# Patient Record
Sex: FEMALE | Race: BLACK OR AFRICAN AMERICAN | Employment: FULL TIME | URBAN - NONMETROPOLITAN AREA
[De-identification: names, ages, dates, MRNs, and addresses within clinical notes are randomized per-mention and may not be internally consistent; named-entity substitution may affect disease eponyms.]

---

## 2016-12-29 LAB
A/G RATIO: NORMAL
ALBUMIN SERPL-MCNC: 5.1 G/DL
ALP BLD-CCNC: 94 U/L
ALT SERPL-CCNC: 46 U/L
AST SERPL-CCNC: 34 U/L
BILIRUB SERPL-MCNC: 0.6 MG/DL (ref 0.1–1.4)
BILIRUBIN DIRECT: 0.1 MG/DL
BILIRUBIN, INDIRECT: 0.6
CHOLESTEROL, TOTAL: 247 MG/DL
CHOLESTEROL/HDL RATIO: 4.6
GLOBULIN: NORMAL
HDLC SERPL-MCNC: 53 MG/DL (ref 35–70)
LDL CHOLESTEROL CALCULATED: 174 MG/DL (ref 0–160)
PROTEIN TOTAL: 7.6 G/DL
TRIGL SERPL-MCNC: 170 MG/DL
VLDLC SERPL CALC-MCNC: 34 MG/DL

## 2017-01-03 ENCOUNTER — ANTI-COAG VISIT (OUTPATIENT)
Dept: OTHER | Age: 52
End: 2017-01-03

## 2017-01-03 VITALS
SYSTOLIC BLOOD PRESSURE: 142 MMHG | DIASTOLIC BLOOD PRESSURE: 88 MMHG | BODY MASS INDEX: 39.65 KG/M2 | WEIGHT: 231 LBS | HEART RATE: 82 BPM

## 2017-01-03 DIAGNOSIS — I26.99 OTHER PULMONARY EMBOLISM WITHOUT ACUTE COR PULMONALE, UNSPECIFIED CHRONICITY (HCC): ICD-10-CM

## 2017-01-03 LAB
HCT VFR BLD CALC: 38.4 % (ref 37–47)
HEMOGLOBIN: 12.8 GM/DL (ref 12–16)
POC INR: 2.9 (ref 0.8–1.2)

## 2017-01-03 PROCEDURE — 99999 PR OFFICE/OUTPT VISIT,PROCEDURE ONLY: CPT | Performed by: NURSE PRACTITIONER

## 2017-01-03 PROCEDURE — 85610 PROTHROMBIN TIME: CPT | Performed by: NURSE PRACTITIONER

## 2017-01-03 RX ORDER — ATORVASTATIN CALCIUM 20 MG/1
20 TABLET, FILM COATED ORAL DAILY
COMMUNITY

## 2017-01-03 ASSESSMENT — ENCOUNTER SYMPTOMS
CONSTIPATION: 0
SHORTNESS OF BREATH: 0
DIARRHEA: 0
BLOOD IN STOOL: 0

## 2017-01-04 ENCOUNTER — TELEPHONE (OUTPATIENT)
Dept: OTHER | Age: 52
End: 2017-01-04

## 2017-01-18 ENCOUNTER — TELEPHONE (OUTPATIENT)
Dept: OTHER | Age: 52
End: 2017-01-18

## 2017-01-31 ENCOUNTER — ANTI-COAG VISIT (OUTPATIENT)
Dept: OTHER | Age: 52
End: 2017-01-31

## 2017-01-31 VITALS
BODY MASS INDEX: 39.48 KG/M2 | WEIGHT: 230 LBS | SYSTOLIC BLOOD PRESSURE: 145 MMHG | DIASTOLIC BLOOD PRESSURE: 88 MMHG | HEART RATE: 76 BPM

## 2017-01-31 DIAGNOSIS — I26.99 OTHER PULMONARY EMBOLISM WITHOUT ACUTE COR PULMONALE, UNSPECIFIED CHRONICITY (HCC): ICD-10-CM

## 2017-01-31 LAB — POC INR: 2.6 (ref 0.8–1.2)

## 2017-01-31 PROCEDURE — 85610 PROTHROMBIN TIME: CPT | Performed by: NURSE PRACTITIONER

## 2017-01-31 PROCEDURE — 99999 PR OFFICE/OUTPT VISIT,PROCEDURE ONLY: CPT | Performed by: NURSE PRACTITIONER

## 2017-01-31 RX ORDER — LORATADINE 10 MG/1
10 TABLET ORAL DAILY
COMMUNITY
End: 2017-05-23

## 2017-01-31 RX ORDER — AMOXICILLIN AND CLAVULANATE POTASSIUM 875; 125 MG/1; MG/1
TABLET, FILM COATED ORAL
Refills: 0 | COMMUNITY
Start: 2017-01-25 | End: 2017-03-28

## 2017-01-31 ASSESSMENT — ENCOUNTER SYMPTOMS
DIARRHEA: 0
CONSTIPATION: 0
SHORTNESS OF BREATH: 0
BLOOD IN STOOL: 0

## 2017-03-07 ENCOUNTER — ANTI-COAG VISIT (OUTPATIENT)
Dept: OTHER | Age: 52
End: 2017-03-07

## 2017-03-07 VITALS
HEART RATE: 74 BPM | WEIGHT: 231 LBS | BODY MASS INDEX: 39.65 KG/M2 | SYSTOLIC BLOOD PRESSURE: 134 MMHG | DIASTOLIC BLOOD PRESSURE: 75 MMHG

## 2017-03-07 DIAGNOSIS — I26.99 OTHER PULMONARY EMBOLISM WITHOUT ACUTE COR PULMONALE, UNSPECIFIED CHRONICITY (HCC): ICD-10-CM

## 2017-03-07 LAB — POC INR: 3.2 (ref 0.8–1.2)

## 2017-03-07 PROCEDURE — 99212 OFFICE O/P EST SF 10 MIN: CPT | Performed by: NURSE PRACTITIONER

## 2017-03-07 PROCEDURE — 85610 PROTHROMBIN TIME: CPT | Performed by: NURSE PRACTITIONER

## 2017-03-07 ASSESSMENT — ENCOUNTER SYMPTOMS
DIARRHEA: 0
SHORTNESS OF BREATH: 0
BLOOD IN STOOL: 0
CONSTIPATION: 0

## 2017-03-22 DIAGNOSIS — I26.99 OTHER ACUTE PULMONARY EMBOLISM WITHOUT ACUTE COR PULMONALE (HCC): Primary | ICD-10-CM

## 2017-03-28 ENCOUNTER — ANTI-COAG VISIT (OUTPATIENT)
Dept: OTHER | Age: 52
End: 2017-03-28

## 2017-03-28 VITALS
SYSTOLIC BLOOD PRESSURE: 135 MMHG | BODY MASS INDEX: 39.31 KG/M2 | HEART RATE: 76 BPM | DIASTOLIC BLOOD PRESSURE: 75 MMHG | WEIGHT: 229 LBS

## 2017-03-28 DIAGNOSIS — I26.99 OTHER PULMONARY EMBOLISM WITHOUT ACUTE COR PULMONALE, UNSPECIFIED CHRONICITY (HCC): ICD-10-CM

## 2017-03-28 DIAGNOSIS — I26.99 OTHER ACUTE PULMONARY EMBOLISM WITHOUT ACUTE COR PULMONALE (HCC): ICD-10-CM

## 2017-03-28 LAB — POC INR: 2.5 (ref 0.8–1.2)

## 2017-03-28 RX ORDER — ALENDRONATE SODIUM 35 MG/1
70 TABLET ORAL
COMMUNITY
Start: 2017-02-11 | End: 2017-12-28

## 2017-03-28 ASSESSMENT — ENCOUNTER SYMPTOMS
BLOOD IN STOOL: 0
CONSTIPATION: 0
DIARRHEA: 0
SHORTNESS OF BREATH: 0

## 2017-04-25 ENCOUNTER — ANTI-COAG VISIT (OUTPATIENT)
Dept: OTHER | Age: 52
End: 2017-04-25

## 2017-04-25 VITALS
DIASTOLIC BLOOD PRESSURE: 77 MMHG | BODY MASS INDEX: 39.65 KG/M2 | SYSTOLIC BLOOD PRESSURE: 132 MMHG | HEART RATE: 75 BPM | WEIGHT: 231 LBS

## 2017-04-25 DIAGNOSIS — I26.99 OTHER PULMONARY EMBOLISM WITHOUT ACUTE COR PULMONALE, UNSPECIFIED CHRONICITY (HCC): ICD-10-CM

## 2017-04-25 DIAGNOSIS — I26.99 OTHER ACUTE PULMONARY EMBOLISM WITHOUT ACUTE COR PULMONALE (HCC): ICD-10-CM

## 2017-04-25 LAB — POC INR: 3 (ref 0.8–1.2)

## 2017-04-25 RX ORDER — BENZONATATE 200 MG/1
200 CAPSULE ORAL 3 TIMES DAILY PRN
COMMUNITY
End: 2017-04-25

## 2017-04-25 ASSESSMENT — ENCOUNTER SYMPTOMS
BLOOD IN STOOL: 0
CONSTIPATION: 0
SHORTNESS OF BREATH: 0

## 2017-05-04 ENCOUNTER — TELEPHONE (OUTPATIENT)
Dept: OTHER | Age: 52
End: 2017-05-04

## 2017-05-23 ENCOUNTER — ANTI-COAG VISIT (OUTPATIENT)
Dept: OTHER | Age: 52
End: 2017-05-23

## 2017-05-23 VITALS
DIASTOLIC BLOOD PRESSURE: 68 MMHG | SYSTOLIC BLOOD PRESSURE: 119 MMHG | BODY MASS INDEX: 38.28 KG/M2 | WEIGHT: 223 LBS | HEART RATE: 64 BPM

## 2017-05-23 DIAGNOSIS — I26.99 OTHER ACUTE PULMONARY EMBOLISM WITHOUT ACUTE COR PULMONALE (HCC): ICD-10-CM

## 2017-05-23 DIAGNOSIS — I26.99 OTHER PULMONARY EMBOLISM WITHOUT ACUTE COR PULMONALE, UNSPECIFIED CHRONICITY (HCC): ICD-10-CM

## 2017-05-23 PROBLEM — E11.9 TYPE 2 DIABETES MELLITUS (HCC): Status: ACTIVE | Noted: 2017-05-23

## 2017-05-23 LAB — POC INR: 1.3 (ref 0.8–1.2)

## 2017-05-23 RX ORDER — LISINOPRIL 5 MG/1
5 TABLET ORAL DAILY
COMMUNITY

## 2017-05-23 ASSESSMENT — ENCOUNTER SYMPTOMS
CONSTIPATION: 0
BLOOD IN STOOL: 0
RHINORRHEA: 1
DIARRHEA: 0
SHORTNESS OF BREATH: 0

## 2017-05-30 RX ORDER — WARFARIN SODIUM 5 MG/1
TABLET ORAL
Qty: 270 TABLET | Refills: 3 | Status: SHIPPED | OUTPATIENT
Start: 2017-05-30 | End: 2018-06-01 | Stop reason: SDUPTHER

## 2017-05-30 RX ORDER — WARFARIN SODIUM 5 MG/1
TABLET ORAL
Qty: 270 TABLET | Refills: 2 | OUTPATIENT
Start: 2017-05-30

## 2017-06-06 ENCOUNTER — ANTI-COAG VISIT (OUTPATIENT)
Dept: OTHER | Age: 52
End: 2017-06-06

## 2017-06-06 VITALS
HEART RATE: 69 BPM | BODY MASS INDEX: 37.59 KG/M2 | DIASTOLIC BLOOD PRESSURE: 67 MMHG | SYSTOLIC BLOOD PRESSURE: 111 MMHG | WEIGHT: 219 LBS

## 2017-06-06 DIAGNOSIS — I26.99 OTHER PULMONARY EMBOLISM WITHOUT ACUTE COR PULMONALE, UNSPECIFIED CHRONICITY (HCC): ICD-10-CM

## 2017-06-06 DIAGNOSIS — I26.99 OTHER ACUTE PULMONARY EMBOLISM WITHOUT ACUTE COR PULMONALE (HCC): ICD-10-CM

## 2017-06-06 LAB — POC INR: 1.6 (ref 0.8–1.2)

## 2017-06-06 RX ORDER — CETIRIZINE HYDROCHLORIDE 10 MG/1
10 TABLET ORAL DAILY
COMMUNITY
Start: 2017-05-30 | End: 2017-06-15 | Stop reason: ALTCHOICE

## 2017-06-06 ASSESSMENT — ENCOUNTER SYMPTOMS
CONSTIPATION: 0
DIARRHEA: 0
SHORTNESS OF BREATH: 0
BLOOD IN STOOL: 0

## 2017-06-13 ENCOUNTER — TELEPHONE (OUTPATIENT)
Dept: OTHER | Age: 52
End: 2017-06-13

## 2017-06-15 ENCOUNTER — ANTI-COAG VISIT (OUTPATIENT)
Dept: OTHER | Age: 52
End: 2017-06-15

## 2017-06-15 VITALS
WEIGHT: 218.6 LBS | SYSTOLIC BLOOD PRESSURE: 123 MMHG | BODY MASS INDEX: 37.52 KG/M2 | DIASTOLIC BLOOD PRESSURE: 85 MMHG | HEART RATE: 62 BPM

## 2017-06-15 DIAGNOSIS — I26.99 OTHER ACUTE PULMONARY EMBOLISM WITHOUT ACUTE COR PULMONALE (HCC): ICD-10-CM

## 2017-06-15 DIAGNOSIS — I26.99 OTHER PULMONARY EMBOLISM WITHOUT ACUTE COR PULMONALE, UNSPECIFIED CHRONICITY (HCC): ICD-10-CM

## 2017-06-15 LAB — POC INR: 1.7 (ref 0.8–1.2)

## 2017-06-15 RX ORDER — LORATADINE 10 MG/1
10 TABLET ORAL DAILY
COMMUNITY
End: 2017-10-10 | Stop reason: ALTCHOICE

## 2017-06-15 ASSESSMENT — ENCOUNTER SYMPTOMS
CONSTIPATION: 0
BLOOD IN STOOL: 0
DIARRHEA: 0
SHORTNESS OF BREATH: 0

## 2017-06-26 ENCOUNTER — ANTI-COAG VISIT (OUTPATIENT)
Dept: OTHER | Age: 52
End: 2017-06-26

## 2017-06-26 VITALS
SYSTOLIC BLOOD PRESSURE: 126 MMHG | WEIGHT: 217 LBS | BODY MASS INDEX: 37.25 KG/M2 | HEART RATE: 78 BPM | DIASTOLIC BLOOD PRESSURE: 82 MMHG

## 2017-06-26 DIAGNOSIS — I26.99 OTHER PULMONARY EMBOLISM WITHOUT ACUTE COR PULMONALE, UNSPECIFIED CHRONICITY (HCC): ICD-10-CM

## 2017-06-26 DIAGNOSIS — I26.99 OTHER ACUTE PULMONARY EMBOLISM WITHOUT ACUTE COR PULMONALE (HCC): ICD-10-CM

## 2017-06-26 LAB
INR BLD: 1.5
POC INR: 1.5 (ref 0.8–1.2)

## 2017-06-26 ASSESSMENT — ENCOUNTER SYMPTOMS
SHORTNESS OF BREATH: 0
DIARRHEA: 0
CONSTIPATION: 0
BLOOD IN STOOL: 0

## 2017-06-28 ENCOUNTER — OFFICE VISIT (OUTPATIENT)
Dept: OTHER | Age: 52
End: 2017-06-28

## 2017-06-28 DIAGNOSIS — E11.8 TYPE 2 DIABETES MELLITUS WITH COMPLICATION, WITHOUT LONG-TERM CURRENT USE OF INSULIN (HCC): Primary | ICD-10-CM

## 2017-07-11 ENCOUNTER — ANTI-COAG VISIT (OUTPATIENT)
Dept: OTHER | Age: 52
End: 2017-07-11

## 2017-07-11 VITALS
BODY MASS INDEX: 36.53 KG/M2 | WEIGHT: 212.8 LBS | HEART RATE: 74 BPM | DIASTOLIC BLOOD PRESSURE: 73 MMHG | SYSTOLIC BLOOD PRESSURE: 117 MMHG

## 2017-07-11 DIAGNOSIS — I26.99 OTHER PULMONARY EMBOLISM WITHOUT ACUTE COR PULMONALE, UNSPECIFIED CHRONICITY (HCC): ICD-10-CM

## 2017-07-11 DIAGNOSIS — I26.99 OTHER ACUTE PULMONARY EMBOLISM WITHOUT ACUTE COR PULMONALE (HCC): ICD-10-CM

## 2017-07-11 LAB — POC INR: 1.6 (ref 0.8–1.2)

## 2017-07-11 ASSESSMENT — ENCOUNTER SYMPTOMS
BLOOD IN STOOL: 0
DIARRHEA: 0
SHORTNESS OF BREATH: 0
CONSTIPATION: 0

## 2017-07-18 ENCOUNTER — HOSPITAL ENCOUNTER (OUTPATIENT)
Dept: PHARMACY | Age: 52
Setting detail: THERAPIES SERIES
Discharge: HOME | End: 2017-07-18
Payer: COMMERCIAL

## 2017-07-18 VITALS
SYSTOLIC BLOOD PRESSURE: 116 MMHG | BODY MASS INDEX: 37.04 KG/M2 | HEART RATE: 79 BPM | WEIGHT: 215.8 LBS | DIASTOLIC BLOOD PRESSURE: 66 MMHG

## 2017-07-18 DIAGNOSIS — I26.99 OTHER PULMONARY EMBOLISM WITHOUT ACUTE COR PULMONALE, UNSPECIFIED CHRONICITY (HCC): ICD-10-CM

## 2017-07-18 LAB — POC INR: 2.5 (ref 0.8–1.2)

## 2017-07-18 PROCEDURE — 36416 COLLJ CAPILLARY BLOOD SPEC: CPT

## 2017-07-18 PROCEDURE — 85610 PROTHROMBIN TIME: CPT

## 2017-07-18 PROCEDURE — 99211 OFF/OP EST MAY X REQ PHY/QHP: CPT

## 2017-08-01 ENCOUNTER — HOSPITAL ENCOUNTER (OUTPATIENT)
Dept: PHARMACY | Age: 52
Setting detail: THERAPIES SERIES
Discharge: HOME | End: 2017-08-01
Payer: COMMERCIAL

## 2017-08-01 VITALS
WEIGHT: 213.6 LBS | DIASTOLIC BLOOD PRESSURE: 70 MMHG | SYSTOLIC BLOOD PRESSURE: 120 MMHG | BODY MASS INDEX: 36.66 KG/M2 | HEART RATE: 74 BPM

## 2017-08-01 DIAGNOSIS — I26.99 OTHER PULMONARY EMBOLISM WITHOUT ACUTE COR PULMONALE, UNSPECIFIED CHRONICITY (HCC): ICD-10-CM

## 2017-08-01 LAB — POC INR: 2.3 (ref 0.8–1.2)

## 2017-08-01 PROCEDURE — 85610 PROTHROMBIN TIME: CPT

## 2017-08-01 PROCEDURE — 99211 OFF/OP EST MAY X REQ PHY/QHP: CPT

## 2017-08-01 PROCEDURE — 36416 COLLJ CAPILLARY BLOOD SPEC: CPT

## 2017-08-22 ENCOUNTER — HOSPITAL ENCOUNTER (OUTPATIENT)
Dept: PHARMACY | Age: 52
Setting detail: THERAPIES SERIES
Discharge: HOME | End: 2017-08-22
Payer: COMMERCIAL

## 2017-08-22 VITALS
SYSTOLIC BLOOD PRESSURE: 119 MMHG | BODY MASS INDEX: 36.42 KG/M2 | WEIGHT: 212.2 LBS | DIASTOLIC BLOOD PRESSURE: 82 MMHG | HEART RATE: 57 BPM

## 2017-08-22 DIAGNOSIS — I26.99 OTHER PULMONARY EMBOLISM WITHOUT ACUTE COR PULMONALE, UNSPECIFIED CHRONICITY (HCC): ICD-10-CM

## 2017-08-22 DIAGNOSIS — I26.99 OTHER PULMONARY EMBOLISM WITHOUT ACUTE COR PULMONALE (HCC): ICD-10-CM

## 2017-08-22 LAB
INR BLD: 2
POC INR: 2 (ref 0.8–1.2)

## 2017-08-22 PROCEDURE — 99211 OFF/OP EST MAY X REQ PHY/QHP: CPT | Performed by: PHARMACIST

## 2017-08-22 PROCEDURE — 36416 COLLJ CAPILLARY BLOOD SPEC: CPT | Performed by: PHARMACIST

## 2017-08-22 PROCEDURE — 85610 PROTHROMBIN TIME: CPT | Performed by: PHARMACIST

## 2017-09-12 ENCOUNTER — HOSPITAL ENCOUNTER (OUTPATIENT)
Dept: PHARMACY | Age: 52
Setting detail: THERAPIES SERIES
Discharge: HOME | End: 2017-09-12
Payer: COMMERCIAL

## 2017-09-12 VITALS — BODY MASS INDEX: 36.23 KG/M2 | HEIGHT: 64 IN | WEIGHT: 212.2 LBS

## 2017-09-12 LAB
HCT VFR BLD CALC: 39.4 % (ref 35–44)
HEMOGLOBIN: 12.6 GM/DL (ref 12–15)

## 2017-09-12 PROCEDURE — 97803 MED NUTRITION INDIV SUBSEQ: CPT | Performed by: DIETITIAN, REGISTERED

## 2017-09-19 ENCOUNTER — HOSPITAL ENCOUNTER (OUTPATIENT)
Dept: PHARMACY | Age: 52
Setting detail: THERAPIES SERIES
Discharge: HOME | End: 2017-09-19
Payer: COMMERCIAL

## 2017-09-19 VITALS
DIASTOLIC BLOOD PRESSURE: 81 MMHG | WEIGHT: 213.2 LBS | BODY MASS INDEX: 36.6 KG/M2 | HEART RATE: 69 BPM | SYSTOLIC BLOOD PRESSURE: 130 MMHG

## 2017-09-19 DIAGNOSIS — I26.99 OTHER PULMONARY EMBOLISM WITHOUT ACUTE COR PULMONALE, UNSPECIFIED CHRONICITY (HCC): ICD-10-CM

## 2017-09-19 LAB — POC INR: 2.5 (ref 0.8–1.2)

## 2017-09-19 PROCEDURE — 36416 COLLJ CAPILLARY BLOOD SPEC: CPT

## 2017-09-19 PROCEDURE — 85610 PROTHROMBIN TIME: CPT

## 2017-09-19 PROCEDURE — 99211 OFF/OP EST MAY X REQ PHY/QHP: CPT

## 2017-09-19 ASSESSMENT — ENCOUNTER SYMPTOMS
DIARRHEA: 0
BLOOD IN STOOL: 0
SHORTNESS OF BREATH: 0
CONSTIPATION: 0

## 2017-10-03 ENCOUNTER — TELEPHONE (OUTPATIENT)
Dept: PHARMACY | Age: 52
End: 2017-10-03

## 2017-10-05 ENCOUNTER — HOSPITAL ENCOUNTER (OUTPATIENT)
Dept: PHARMACY | Age: 52
Setting detail: THERAPIES SERIES
Discharge: HOME | End: 2017-10-05
Payer: COMMERCIAL

## 2017-10-05 VITALS
BODY MASS INDEX: 36.25 KG/M2 | SYSTOLIC BLOOD PRESSURE: 117 MMHG | WEIGHT: 211.2 LBS | HEART RATE: 70 BPM | DIASTOLIC BLOOD PRESSURE: 71 MMHG

## 2017-10-05 DIAGNOSIS — I26.99 OTHER PULMONARY EMBOLISM WITHOUT ACUTE COR PULMONALE, UNSPECIFIED CHRONICITY (HCC): ICD-10-CM

## 2017-10-05 LAB — POC INR: 3.3 (ref 0.8–1.2)

## 2017-10-05 PROCEDURE — 36416 COLLJ CAPILLARY BLOOD SPEC: CPT

## 2017-10-05 PROCEDURE — 99211 OFF/OP EST MAY X REQ PHY/QHP: CPT

## 2017-10-05 PROCEDURE — 85610 PROTHROMBIN TIME: CPT

## 2017-10-05 RX ORDER — METRONIDAZOLE 500 MG/1
500 TABLET ORAL 3 TIMES DAILY
COMMUNITY
Start: 2017-10-03 | End: 2017-10-12

## 2017-10-05 RX ORDER — CIPROFLOXACIN 500 MG/1
500 TABLET, FILM COATED ORAL 2 TIMES DAILY
COMMUNITY
Start: 2017-10-03 | End: 2017-10-12

## 2017-10-05 ASSESSMENT — ENCOUNTER SYMPTOMS
CONSTIPATION: 0
ANAL BLEEDING: 0
SHORTNESS OF BREATH: 0
BLOOD IN STOOL: 0

## 2017-10-05 NOTE — IP AVS SNAPSHOT
Additional Information about your Body Mass Index (BMI)           Your BMI as listed above is considered obese (30 or more). BMI is an estimate of body fat, calculated from your height and weight. The higher your BMI, the greater your risk of heart disease, high blood pressure, type 2 diabetes, stroke, gallstones, arthritis, sleep apnea, and certain cancers. BMI is not perfect. It may overestimate body fat in athletes and people who are more muscular. Even a small weight loss (between 5 and 10 percent of your current weight) by decreasing your calorie intake and becoming more physically active will help lower your risk of developing or worsening diseases associated with obesity.      Learn more at: [email or web address]             Medications and Orders      Your Current Medications Are              ciprofloxacin (CIPRO) 500 MG tablet Take 500 mg by mouth 2 times daily    metroNIDAZOLE (FLAGYL) 500 MG tablet Take 500 mg by mouth 3 times daily    loratadine (CLARITIN) 10 MG tablet Take 10 mg by mouth daily Indications: allergies    warfarin (COUMADIN) 5 MG tablet Take as directed by [facility]. 270 tablets = 90 days    metFORMIN (GLUCOPHAGE) 500 MG tablet take 1 tablet by mouth three times a day with food    B Complex Vitamins (B COMPLEX PO) Take 1 tablet by mouth 2 times daily Indications: Diabetes    lisinopril (PRINIVIL;ZESTRIL) 5 MG tablet Take 5 mg by mouth daily Indications: Diabetes, Raised Blood Pressure    alendronate (FOSAMAX) 35 MG tablet Take by mouth every 7 days Indications: Osteoporosis     atorvastatin (LIPITOR) 20 MG tablet Take 20 mg by mouth daily Indications: Blood Cholesterol Abnormal    Albuterol Sulfate (VENTOLIN HFA IN) Inhale 2 puffs into the lungs every 4 hours as needed Indications: Asthma    ALPRAZolam (XANAX) 1 MG tablet Take 1 tablet by mouth every 4 hours as needed for Sleep or Anxiety Calcium Carbonate (CALTRATE 600 PO) Take 2 tablets by mouth daily supplement    Acetaminophen (TYLENOL PO) Take  by mouth as needed. Don't take more than 3,000 mg per day.    Indications: Pain      Allergies           No Known Allergies      We Ordered/Performed the following           POCT INR          Result Summary for POCT INR      Result Information       Status          Abnormal Final result (Collected: 10/5/2017  3:58 PM)           10/5/2017  4:01 PM      Component Results     Component Value Ref Range & Units Status    POC INR 3.30 (H) 0.80 - 1.20 Final    ---------INDICATION-----------------------INR Reference Range  DVT, PE, AF, AMI, tissue heart valve          2.0 to 3.0  Mechanical prosthetic valves                  2.5 to 3.5  Performed at [address] [place] [address], [address]                 Additional Information        Basic Information     Date Of Birth Sex Race Ethnicity Preferred Language    1965 Female Black Non-/Non  English      Problem List as of 10/5/2017  Date Reviewed: 10/5/2017                Type 2 diabetes mellitus (HCC)    Asthma    Plantar fasciitis, right    TERESA (obstructive sleep apnea)    Sinus bradycardia -  and pauses only in the night range from 2.1 to 3.7 sec pause-pat Dxed with TERESA 2013 and cpap- Holter monitor after CPAP got better    Anticoagulated on Coumadin    Annalee filter in place    Abnormal echocardiogram EF 55%, RVE and RV function reduced post PE    Elevated troponin 4.0 followin g PE- nuc stress negative    SOB (shortness of breath) on exertion- improving    Hx of Recurrent  pulmonary embolism dec 2009 and 09/2013- need to cont life long coumadin    hx of Syncope and collapse due to, PE    Obesity      Immunizations as of 10/5/2017     Name Date    Influenza Virus Vaccine 10/5/2015, 10/10/2014, 9/11/2013    Influenza, Carmen Rothman, 3 Years and older, IM 10/12/2016    Pneumococcal Polysaccharide ([id number]) 9/12/2013 changed, so think of one that is secure and easy to remember. 6. Create a [employer] password. You can change your password at any time. 7. Enter your Password Reset Question and Answer. This can be used at a later time if you forget your password. 8. Enter your e-mail address. You will receive e-mail notification when new information is available in [address]. 9. Click Sign Up. You can now view your medical record. Additional Information  If you have questions, please contact the physician practice where you receive care. Remember, [employer] is NOT to be used for urgent needs. For medical emergencies, dial 911. For questions regarding your [employer] account call [phone]. If you have a clinical question, please call your doctor's office. October 2017 Details    Sun Mon Tue Wed Thu Fri Sat     1               2               3               4               5      10 mg   See details      6      5 mg         7      10 mg           8      10 mg         9      7.5 mg         10      10 mg         11               12               13               14                 15               16               17               18               19               20               21                 22               23               24               25               26               27               28                 29               30               31                    Date Details   10/05 This INR check       Date of next INR:  10/10/2017         How to take your warfarin dose              To take:  5 mg Take 1 of the 5 mg tablets. To take:  7.5 mg Take 1.5 of the 5 mg tablets. To take:  10 mg Take 2 of the 5 mg tablets.

## 2017-10-10 ENCOUNTER — HOSPITAL ENCOUNTER (OUTPATIENT)
Dept: PHARMACY | Age: 52
Setting detail: THERAPIES SERIES
Discharge: HOME | End: 2017-10-10
Payer: COMMERCIAL

## 2017-10-10 ENCOUNTER — OFFICE VISIT (OUTPATIENT)
Dept: CARDIOLOGY CLINIC | Age: 52
End: 2017-10-10
Payer: COMMERCIAL

## 2017-10-10 VITALS
HEART RATE: 57 BPM | WEIGHT: 207 LBS | SYSTOLIC BLOOD PRESSURE: 137 MMHG | BODY MASS INDEX: 35.53 KG/M2 | DIASTOLIC BLOOD PRESSURE: 80 MMHG

## 2017-10-10 VITALS
HEART RATE: 78 BPM | HEIGHT: 64 IN | DIASTOLIC BLOOD PRESSURE: 68 MMHG | SYSTOLIC BLOOD PRESSURE: 100 MMHG | WEIGHT: 210 LBS | BODY MASS INDEX: 35.85 KG/M2

## 2017-10-10 DIAGNOSIS — I26.99 OTHER PULMONARY EMBOLISM WITHOUT ACUTE COR PULMONALE, UNSPECIFIED CHRONICITY (HCC): ICD-10-CM

## 2017-10-10 DIAGNOSIS — R93.1 ABNORMAL ECHOCARDIOGRAM: ICD-10-CM

## 2017-10-10 DIAGNOSIS — Z79.01 ANTICOAGULATED ON COUMADIN: Primary | ICD-10-CM

## 2017-10-10 DIAGNOSIS — R06.02 SOB (SHORTNESS OF BREATH) ON EXERTION: ICD-10-CM

## 2017-10-10 DIAGNOSIS — R00.1 SINUS BRADYCARDIA: Primary | ICD-10-CM

## 2017-10-10 LAB
HCT VFR BLD CALC: 39.1 % (ref 37–47)
HEMOGLOBIN: 12.8 GM/DL (ref 12–16)
INR BLD: 5.23 (ref 0.85–1.13)
INTERNATIONAL NORMALIZATION RATIO, POC: 5.4

## 2017-10-10 PROCEDURE — 99214 OFFICE O/P EST MOD 30 MIN: CPT | Performed by: INTERNAL MEDICINE

## 2017-10-10 PROCEDURE — 93000 ELECTROCARDIOGRAM COMPLETE: CPT | Performed by: INTERNAL MEDICINE

## 2017-10-10 PROCEDURE — 85610 PROTHROMBIN TIME: CPT

## 2017-10-10 PROCEDURE — 99211 OFF/OP EST MAY X REQ PHY/QHP: CPT

## 2017-10-10 PROCEDURE — 36416 COLLJ CAPILLARY BLOOD SPEC: CPT

## 2017-10-10 PROCEDURE — 36415 COLL VENOUS BLD VENIPUNCTURE: CPT

## 2017-10-10 NOTE — PROGRESS NOTES
 Alcohol use Yes      Comment: Ocassionally    Drug use: No    Sexual activity: Not on file     Other Topics Concern    Not on file     Social History Narrative    No narrative on file       Current Outpatient Prescriptions   Medication Sig Dispense Refill    ciprofloxacin (CIPRO) 500 MG tablet Take 500 mg by mouth 2 times daily      metroNIDAZOLE (FLAGYL) 500 MG tablet Take 500 mg by mouth 3 times daily      warfarin (COUMADIN) 5 MG tablet Take as directed by [facility]. 270 tablets = 90 days 270 tablet 3    metFORMIN (GLUCOPHAGE) 500 MG tablet take 1 tablet by mouth three times a day with food  0    lisinopril (PRINIVIL;ZESTRIL) 5 MG tablet Take 5 mg by mouth daily Indications: Diabetes, Raised Blood Pressure      alendronate (FOSAMAX) 35 MG tablet Take by mouth every 7 days Indications: Osteoporosis       atorvastatin (LIPITOR) 20 MG tablet Take 20 mg by mouth daily Indications: Blood Cholesterol Abnormal      Albuterol Sulfate (VENTOLIN HFA IN) Inhale 2 puffs into the lungs every 4 hours as needed Indications: Asthma      Calcium Carbonate (CALTRATE 600 PO) Take 2 tablets by mouth daily supplement      Acetaminophen (TYLENOL PO) Take  by mouth as needed. Don't take more than 3,000 mg per day. Indications: Pain       No current facility-administered medications for this visit. Review of Systems -     General ROS: negative  Psychological ROS: negative  Hematological and Lymphatic ROS: No history of blood clots or bleeding disorder. Respiratory ROS: no cough, shortness of breath, or wheezing  Cardiovascular ROS: no chest pain or dyspnea on exertion  Gastrointestinal ROS: negative  Genito-Urinary ROS: no dysuria, trouble voiding, or hematuria  Musculoskeletal ROS: negative  Neurological ROS: no TIA or stroke symptoms  Dermatological ROS: negative      Blood pressure 100/68, pulse 78, height 5' 4\" (1.626 m), weight 210 lb (95.3 kg).         Physical Examination:    General appearance - alert, well appearing, and in no distress  Mental status - alert, oriented to person, place, and time  Neck - supple, no significant adenopathy, no JVD, or carotid bruits  Chest - clear to auscultation, no wheezes, rales or rhonchi, symmetric air entry  Heart - normal rate, regular rhythm, normal S1, S2, no murmurs, rubs, clicks or gallops  Abdomen - soft, nontender, nondistended, no masses or organomegaly  Neurological - alert, oriented, normal speech, no focal findings or movement disorder noted  Musculoskeletal - no joint tenderness, deformity or swelling  Extremities - peripheral pulses normal, no pedal edema, no clubbing or cyanosis  Skin - normal coloration and turgor, no rashes, no suspicious skin lesions noted    Lab  No results for input(s): CKTOTAL, CKMB, CKMBINDEX, TROPONINI in the last 72 hours.   CBC:   Lab Results   Component Value Date    WBC 4.9 09/18/2013    RBC 3.98 09/18/2013    HGB 12.8 10/10/2017    HCT 39.1 10/10/2017    MCV 92.1 09/18/2013    MCH 30.1 09/18/2013    MCHC 32.7 09/18/2013    RDW 15.0 09/18/2013     09/18/2013    MPV 8.5 09/18/2013     BMP:    Lab Results   Component Value Date     09/12/2017    K 3.7 09/12/2017     09/12/2017    CO2 24 09/12/2017    BUN 18 09/12/2017    LABALBU 3.8 09/12/2017    CREATININE 0.92 09/12/2017    CALCIUM 8.8 09/12/2017    LABGLOM 64 09/18/2013    GLUCOSE 96 09/12/2017     Hepatic Function Panel:    Lab Results   Component Value Date    ALKPHOS 72 09/12/2017    ALT 28 09/12/2017    AST 24 09/12/2017    PROT 7.0 09/12/2017    PROT 7.6 12/29/2016    BILITOT 0.8 09/12/2017    BILIDIR 0.1 12/29/2016    IBILI 0.6 12/29/2016    LABALBU 3.8 09/12/2017     Magnesium:    No results found for: MG  Warfarin PT/INR:    No components found for: PTPATWAR,  PTINRWAR  HgBA1c:    Lab Results   Component Value Date    LABA1C 6.1 09/10/2013     FLP:    Lab Results   Component Value Date    TRIG 46 09/12/2017    HDL 53 09/12/2017    LDLCALC 174 12/29/2016    LDLDIRECT 81 09/12/2017     TSH:    Lab Results   Component Value Date    TSH 0.785 05/30/2013     Echo  SUMMARY:    Left ventricle: There is moderate hypokinesis of the entire septum. Size was normal.  Systolic function was at the lower limits of normal by Teichholz. Ejection  fraction was estimated in the range of 50 % to 55 %. This study was inadequate for the evaluation of regional wall motion. Wall thickness was at the upper limits of normal.  Doppler parameters were consistent with abnormal left ventricular relaxation  (grade 1 diastolic dysfunction). Right ventricle: The ventricle was mildly to moderately dilated. Systolic function was moderately to markedly reduced. Right atrium:  The atrium was mildly dilated. Tricuspid valve: There was mild regurgitation. Inferior vena cava, hepatic veins: The respirophasic change in diameter was less than 50%. Prepared and signed by    Ernie Nicholson MD    EKG   Sinus bradycardia  Otherwise normal ECG  When compared with ECG of 10-SEP-2013 07:00,  Ventricular rate has decreased BY 37 BPM  ST no longer elevated in Anterior leads  T wave inversion less evident in Anterior leads  QT has shortened  Confirmed by Bere Duran MD, Jackieyl Teetee ([id number])    Nuc stress Okay    EKG 8/14/14-Sinus  Rhythm   -RSR(V1) -nondiagnostic. PROBABLY NORMAL    2/12/14-ekg-Sinus  Bradycardia   -RSR(V1) -nondiagnostic. PROBABLY NORMAL      CONCLUSION:  1. This is a normal sinus rhythm with average heart rate of 76 beats  per minute ranging from  beats per minute. 2. No significant pause of more than 1.4 seconds noted. 3. Rare ventricular ectopic beats, all isolated. Rare  supraventricular ectopic beats, isolated and couplets. 4. No other form of arrhythmia noted. No significant bradyarrhythmia  noted as well.          Aimee Prieto M.D.  Bandar Valentin  D: 12/31/2015 17:44     EKG 10/13/16  Sinus  Rhythm   -Incomplete right bundle branch block.      ABNORMAL Assessment    1. Sinus bradycardia -  and pauses only in the night range from 2.1 to 3.7 sec pause-pat Dxed with TERESA  and cpap- Holter monitor after CPAP got better  EKG 12 Lead    Holter Monitor 48 Hour   2. Abnormal echocardiogram EF 55%, RVE and RV function reduced post PE  Holter Monitor 48 Hour   3. SOB (shortness of breath) on exertion- improving  Holter Monitor 48 Hour       IMPRESSION:   1. Bilateral pulmonary emboli - recurrent. 2. Incomplete right bundle branch block and EKG abnormalities   secondary to her pulmonary emboli. 3. Mildly elevated troponin secondary to her pulmonary emboli    NO FHX of premature CAD    Father  at 45 yrs in his sleep-stated ETOH related and liver problem from ETOH    Plan   Current meds and labs reviewed    Abn holter with 2.1 tioo 3.7 sec pause- resolved and HR got better after stopping lopressor and using CPAP-its seems to be TERESA related  Roman Marienlli already Dxed with TERESA in   Was on CPAP and then the holter 48  Post capa was good no pause  Pause resolved after CPAP  Go to do sleep study on oral appliance if it work without cpap  Now on oral appliance and do holter monitor see if pause remain gone as they were with cpap  D/w the pat at length    Bradycardia  Sinus with no symptom and abn holter  Off   atenolol 12.5 po qd and oncpap  Got better  RADHA definite HX of HTN    HX of DVT and PE - need life Long coumadin    hypercoagulable study- negative    Continue the current treatment and with constant vigilance to changes in symptoms and also any potential side effects. Return for care or seek medical attention immediately if symptoms got worse and/or develop new symptoms.   Asa 81mg po qd    Repeat echo in for RV function-  And pre op- next visit- RV size regressessed  D/w the pat the plan of care and the risk involving surgey  Off pravastatin as lipids are okay and trop related to PE    RTC in  12 months    [facility]

## 2017-10-10 NOTE — PROGRESS NOTES
The [address] [facility]  Anticoagulation Clinic  [phone] (phone)  [phone] (fax)    Visit Date: 10/10/2017    Subjective:     El Jenkins is a 46 y.o. female    HPI:  Patient presents for 5 day INR check. Patient in for anticoagulation management due to PE with a goal INR of 2.0-3.0. INR ordered and reviewed today. Patient reports the following:    Medication changes: still on cipro and flagyl for 3 more days (finish on 10/13/17)  Tablet strength per patient: 5 mg   Patient reported dosing regimen over last 1 week: 10 mg today 10/5, 5 mg 10/6, 10 mg 10/7 & 10/8, and 7.5 mg 10/9  Missed doses in the last 1-2 weeks: no   Extra doses in the last 1-2 weeks: no  Any problems with bleeding/bruising? No  Any recent falls? No   Any signs or symptoms of DVT/PE or stroke? No  Alcohol use: no change  Tobacco use: no change  Diet changes as follows: No changes  Upcoming surgeries or procedures: no  Appointment preference: AM    Review of Systems   HENT: Negative for nosebleeds. Respiratory: Negative for shortness of breath. Cardiovascular: Negative for chest pain and leg swelling. Gastrointestinal: Negative for anal bleeding, blood in stool and constipation. Genitourinary: Negative for hematuria. Neurological: Negative for dizziness, speech difficulty and headaches. Objective:   Physical Exam    Assessment:     Lab Results   Component Value Date    INR 5.23 (HH) 10/10/2017    INR 5.4 10/10/2017    INR 3.30 (H) 10/05/2017     INR supratherapeutic   Recent Labs      10/10/17   0902   INR  5.23*     Plan:     Hold Coumadin 10/10/17 and 10/11/17. Recheck INR 10/12/17. Patient reminded to call the Anticoagulation Clinic with any signs or symptoms of bleeding or with any medication changes. Patient given instructions utilizing the teach back method. H&H and venipuncture INR reviewed. - Continue with plan  Discharged ambulatory in no apparent distress.   Assessment and

## 2017-10-12 ENCOUNTER — HOSPITAL ENCOUNTER (OUTPATIENT)
Dept: PHARMACY | Age: 52
Setting detail: THERAPIES SERIES
Discharge: HOME | End: 2017-10-12
Payer: COMMERCIAL

## 2017-10-12 VITALS
DIASTOLIC BLOOD PRESSURE: 69 MMHG | SYSTOLIC BLOOD PRESSURE: 120 MMHG | HEART RATE: 64 BPM | WEIGHT: 211.6 LBS | BODY MASS INDEX: 36.32 KG/M2

## 2017-10-12 DIAGNOSIS — I26.99 OTHER PULMONARY EMBOLISM WITHOUT ACUTE COR PULMONALE, UNSPECIFIED CHRONICITY (HCC): ICD-10-CM

## 2017-10-12 LAB — POC INR: 2.9 (ref 0.8–1.2)

## 2017-10-12 PROCEDURE — 36416 COLLJ CAPILLARY BLOOD SPEC: CPT

## 2017-10-12 PROCEDURE — 85610 PROTHROMBIN TIME: CPT

## 2017-10-12 PROCEDURE — 99211 OFF/OP EST MAY X REQ PHY/QHP: CPT

## 2017-10-16 ENCOUNTER — HOSPITAL ENCOUNTER (OUTPATIENT)
Dept: NON INVASIVE DIAGNOSTICS | Age: 52
Discharge: HOME | End: 2017-10-16
Payer: COMMERCIAL

## 2017-10-16 DIAGNOSIS — R06.02 SOB (SHORTNESS OF BREATH) ON EXERTION: ICD-10-CM

## 2017-10-16 DIAGNOSIS — R00.1 SINUS BRADYCARDIA: ICD-10-CM

## 2017-10-16 DIAGNOSIS — R93.1 ABNORMAL ECHOCARDIOGRAM: ICD-10-CM

## 2017-10-16 PROCEDURE — 93225 XTRNL ECG REC<48 HRS REC: CPT

## 2017-10-16 PROCEDURE — 93226 XTRNL ECG REC<48 HR SCAN A/R: CPT

## 2017-10-24 NOTE — PROCEDURES
[address] [place] [address]                                HOLTER MONITOR    PATIENT NAME: Franchesca Kimball                    :             1965  MED REC NO:   [id number]                            ROOM:  ACCOUNT NO:   [de-identified]                            ADMISSION DATE:  10/16/2017  PROVIDER:     Julien Rodriguez. JESUS Gandhi STUDY:  10/16/2017    DURATION:  48 hours. QUALITY:  Reasonable. INDICATION:  Bradycardia. FINDINGS:  The patient is in normal sinus rhythm. Average heart rate  of 84 beats per minute ranging from 54 to 136 beats per minute. Maximum R to R interval 1.2 seconds at 4:41 a.m. Two ventricular  ectopic beats, all isolated. 19 supraventricular ectopic beats, all  isolated. Diary presented, no entry noted. CONCLUSION:  This is a normal sinus rhythm. Average heart rate of 84  beats per minute ranging from 54 to 136 beats per minute. No  significant pause of more than 1.2 seconds noted. Two ventricular  ectopic beats and 19 supraventricular ectopic beats, all isolated  noted. No other form of arrhythmia. No diary was presented. This is a benign Holter monitor finding. Shakira Palomares M.D.    D: 10/23/2017 17:31:01       T: 10/24/2017 1:36:05     [email or web address]  Job#: [id number]     Doc#: [id number]

## 2017-10-26 ENCOUNTER — HOSPITAL ENCOUNTER (OUTPATIENT)
Dept: PHARMACY | Age: 52
Setting detail: THERAPIES SERIES
Discharge: HOME | End: 2017-10-26
Payer: COMMERCIAL

## 2017-10-26 VITALS
DIASTOLIC BLOOD PRESSURE: 74 MMHG | TEMPERATURE: 99.7 F | SYSTOLIC BLOOD PRESSURE: 127 MMHG | HEART RATE: 60 BPM | BODY MASS INDEX: 35.53 KG/M2 | WEIGHT: 207 LBS

## 2017-10-26 DIAGNOSIS — I26.99 OTHER PULMONARY EMBOLISM WITHOUT ACUTE COR PULMONALE, UNSPECIFIED CHRONICITY (HCC): ICD-10-CM

## 2017-10-26 LAB — POC INR: 2 (ref 0.8–1.2)

## 2017-10-26 PROCEDURE — 85610 PROTHROMBIN TIME: CPT

## 2017-10-26 PROCEDURE — 99211 OFF/OP EST MAY X REQ PHY/QHP: CPT

## 2017-10-26 PROCEDURE — 36416 COLLJ CAPILLARY BLOOD SPEC: CPT

## 2017-10-26 ASSESSMENT — ENCOUNTER SYMPTOMS
DIARRHEA: 0
CONSTIPATION: 0
SHORTNESS OF BREATH: 0
BLOOD IN STOOL: 0

## 2017-10-26 NOTE — PROGRESS NOTES
[facility]  Anticoagulation Clinic  [phone] (phone)           [phone] (fax)    Visit Date: 10/26/2017     Subjective:       Patient ID: Shanita Jefferson, 46 y.o., female    HPI  Patient seen in clinic for anticoagulation management for diagnosis of PE, per Dr. Rigo Acuna referral (2 week visit; late for today's visit). States correct dose and tablet strength. Denies missed doses. Denies new prescription medications, OTC medications, or supplements. Denies upcoming surgeries or procedures. Lost 4.5# since last visit. Had less salad since last visit. Had no wine in 3-4 weeks. Plans to start exercising again this weekend. Review of Systems   Constitutional: Positive for appetite change (decreased). Negative for activity change and fatigue. HENT: Negative for nosebleeds. Respiratory: Negative for shortness of breath. Cardiovascular: Negative for chest pain and leg swelling. Gastrointestinal: Negative for blood in stool, constipation and diarrhea. Genitourinary: Negative for hematuria. Neurological: Negative for dizziness, weakness, light-headedness and headaches. Hematological: Does not bruise/bleed easily.        Objective:   Physical Exam   Vitals:    10/26/17 1551   BP: 127/74   Pulse: 60       Physical Exam   Constitutional: She is oriented to person, place, and time. She appears well-developed and well-nourished. Cardiovascular: Normal rate. Pulmonary/Chest: Effort normal.   Neurological: She is alert and oriented to person, place, and time. Psychiatric: She has a normal mood and affect. Her behavior is normal.   Vitals reviewed. Assessment:     Lab Results   Component Value Date    INR 2.00 (H) 10/26/2017    INR 2.90 (H) 10/12/2017    INR 5.23 (HH) 10/10/2017     INR therapeutic - goal 2-3. Recent Labs      10/26/17   1551   INR  2.00*                           Plan:   POCT INR ordered; result reviewed.   Continue Coumadin PO 10 mg MWF, 15 mg TThSS. Recheck INR 2 weeks. (Report given - orders entered by Kelton Brizuela., PharmD.)  Patient reminded to call the Anticoagulation Clinic with any signs or symptoms of bleeding or with any medication changes. Patient given instructions utilizing the teach back method. Deferred flu shot (had diverticulitis earlier in month); low-grade temperature noted today. Patient will check to see if she can get it at work next week. Discharged ambulatory in no apparent distress.

## 2017-11-09 ENCOUNTER — HOSPITAL ENCOUNTER (OUTPATIENT)
Dept: PHARMACY | Age: 52
Setting detail: THERAPIES SERIES
Discharge: HOME | End: 2017-11-09
Payer: COMMERCIAL

## 2017-11-09 VITALS
HEART RATE: 60 BPM | WEIGHT: 205.8 LBS | SYSTOLIC BLOOD PRESSURE: 135 MMHG | BODY MASS INDEX: 35.33 KG/M2 | DIASTOLIC BLOOD PRESSURE: 83 MMHG

## 2017-11-09 DIAGNOSIS — I26.99 OTHER PULMONARY EMBOLISM WITHOUT ACUTE COR PULMONALE, UNSPECIFIED CHRONICITY (HCC): ICD-10-CM

## 2017-11-09 LAB — POC INR: 2.2 (ref 0.8–1.2)

## 2017-11-09 PROCEDURE — 36416 COLLJ CAPILLARY BLOOD SPEC: CPT

## 2017-11-09 PROCEDURE — 85610 PROTHROMBIN TIME: CPT

## 2017-11-09 PROCEDURE — 99211 OFF/OP EST MAY X REQ PHY/QHP: CPT

## 2017-11-09 ASSESSMENT — ENCOUNTER SYMPTOMS
SHORTNESS OF BREATH: 0
CONSTIPATION: 0
BLOOD IN STOOL: 0
DIARRHEA: 0

## 2017-11-30 ENCOUNTER — HOSPITAL ENCOUNTER (OUTPATIENT)
Dept: PHARMACY | Age: 52
Setting detail: THERAPIES SERIES
Discharge: HOME | End: 2017-11-30
Payer: COMMERCIAL

## 2017-11-30 VITALS — TEMPERATURE: 98.4 F

## 2017-11-30 DIAGNOSIS — I26.99 OTHER PULMONARY EMBOLISM WITHOUT ACUTE COR PULMONALE, UNSPECIFIED CHRONICITY (HCC): ICD-10-CM

## 2017-11-30 LAB — POC INR: 2.7 (ref 0.8–1.2)

## 2017-11-30 PROCEDURE — 6360000002 HC RX W HCPCS

## 2017-11-30 PROCEDURE — 85610 PROTHROMBIN TIME: CPT

## 2017-11-30 PROCEDURE — G0008 ADMIN INFLUENZA VIRUS VAC: HCPCS

## 2017-11-30 PROCEDURE — 90686 IIV4 VACC NO PRSV 0.5 ML IM: CPT

## 2017-11-30 PROCEDURE — 36416 COLLJ CAPILLARY BLOOD SPEC: CPT

## 2017-11-30 PROCEDURE — G0463 HOSPITAL OUTPT CLINIC VISIT: HCPCS

## 2017-11-30 RX ADMIN — INFLUENZA A VIRUS A/SINGAPORE/GP1908/2015 IVR-180A (H1N1) ANTIGEN (PROPIOLACTONE INACTIVATED), INFLUENZA A VIRUS A/HONG KONG/4801/2014 X-263B (H3N2) ANTIGEN (PROPIOLACTONE INACTIVATED), INFLUENZA B VIRUS B/BRISBANE/46/2015 ANTIGEN (PROPIOLACTONE INACTIVATED), AND INFLUENZA B VIRUS B/PHUKET/3073/2013 BVR-1B ANTIGEN (PROPIOLACTONE INACTIVATED) 0.5 ML: 15; 15; 15; 15 INJECTION, SUSPENSION INTRAMUSCULAR at 16:17

## 2017-11-30 ASSESSMENT — ENCOUNTER SYMPTOMS
DIARRHEA: 0
BLOOD IN STOOL: 0
SHORTNESS OF BREATH: 0
CONSTIPATION: 0

## 2017-11-30 NOTE — PROGRESS NOTES
staff immediately. [facility] Vaccine Information Sheet given to patient for immunization(s) administered. Patient tolerated well, please see immunization note. Discharged ambulatory in no apparent distress.

## 2017-12-14 ENCOUNTER — HOSPITAL ENCOUNTER (OUTPATIENT)
Dept: PHARMACY | Age: 52
Setting detail: THERAPIES SERIES
Discharge: HOME | End: 2017-12-14
Payer: COMMERCIAL

## 2017-12-14 VITALS — HEIGHT: 64 IN | WEIGHT: 210.8 LBS | BODY MASS INDEX: 35.99 KG/M2

## 2017-12-14 PROCEDURE — 97803 MED NUTRITION INDIV SUBSEQ: CPT | Performed by: DIETITIAN, REGISTERED

## 2017-12-14 NOTE — LETTER
[facility]  [address] [facility].  [place]  Phone: [phone]  Fax: [phone]    Yusef Carroll RD, LD        December 15, 2017     [place], [address], [place] [facility], [address]    Patient: Radha Pickett   MR Number: [id number]   YOB: 1965   Date of Visit: 12/14/2017       Dear Dr. Jeffrey Titus: Thank you for referring Yokasta Colon to me for evaluation. Below are the relevant portions of my assessment and plan of care. Assessment:     Vitals from current and previous visits:  Current visit:  Ht: 5' 4\"  Wt: 210 lbs    Previous visit:  Ht: 5' 4\"  Wt: 212.2 lbs  -Body mass index is 36.18 kg/m². 35-39.9 - Obesity Grade II.   - Patient lost 2 lbs kim the past three months.  -Weight goal: lose weight. Nutrition Diagnosis:   Limited adherence to nutrition-related recommendations related to Lack of/restricted food access as evidenced by Patient report of limited food in the house d/t limited income at this time and food recall reveals increased intake of processed foods and lacking intake of fruits, vegetables and whole grain choices. Plan:     Intervention:  -Impression: Pt reports limited income at this time, so not keeping as many mindful food options in the household at this time. Also reports limited physical activity d/t lack of motivation.   -Instructed the patient on: Plate Method and The Importance of Regular Physical Activity.      -Patient Instructions  1.) Consider using your treadmill on a regular basis. The more active you are the better your blood sugars will be and keep weight controlled. 2.) Try to choose chicken breast or turkey breast instead of hot dogs and bologna for a lower fat option. 3.) Continue to drink mostly water throughout the day. Okay to do sugar free beverages as well. 4.) Continue to check your blood sugar twice a day.

## 2017-12-14 NOTE — LETTER
[address] [facility]  [address] [facility].  [address]  Phone: [phone]  Fax: [phone]    Alex Kramer RD, LD        December 15, 2017     Bladimir Bunch NP  [phone]    Patient: Brinda Garcia  MR Number: [id number]  YOB: 1965  Date of Visit: 12/14/2017    Dear Bladimir Bunch NP:    Thank you for the request for consultation for Sumeet Carroller to me. Below are the relevant portions of my assessment and plan of care. Assessment:     Vitals from current and previous visits:  Current visit:  Ht: 5' 4\"  Wt: 210 lbs    Previous visit:  Ht: 5' 4\"  Wt: 212.2 lbs  -Body mass index is 36.18 kg/m². 35-39.9 - Obesity Grade II.   - Patient lost 2 lbs kim the past three months.  -Weight goal: lose weight. Nutrition Diagnosis:   Limited adherence to nutrition-related recommendations related to Lack of/restricted food access as evidenced by Patient report of limited food in the house d/t limited income at this time and food recall reveals increased intake of processed foods and lacking intake of fruits, vegetables and whole grain choices. Plan:     Intervention:  -Impression: Pt reports limited income at this time, so not keeping as many mindful food options in the household at this time. Also reports limited physical activity d/t lack of motivation.   -Instructed the patient on: Plate Method and The Importance of Regular Physical Activity.      -Patient Instructions  1.) Consider using your treadmill on a regular basis. The more active you are the better your blood sugars will be and keep weight controlled. 2.) Try to choose chicken breast or turkey breast instead of hot dogs and bologna for a lower fat option. 3.) Continue to drink mostly water throughout the day. Okay to do sugar free beverages as well. 4.) Continue to check your blood sugar twice a day. -General Diet Recommendations: low fat, low cholesterol, minimize processed foods, minimize simple sugars, increase fiber intake and carb counting.  -Nutrition prescription: 1600 calories/day, 180g carbs/day. Comprehension verified using teachback method. Monitoring/Evaluation:   -Followup visit: PRN  with dietitian.   -Receptiveness to education/goals: Agreeable.  -Evaluation of education: Indicates understanding.  -Readiness to change: contemplation - ambivalent about change use treadmill on a regular basis and action - ready to set action plan and implement choose lean protein choices, drink water throughout the day. -Expected compliance: fair. Thank you for your referral of this patient. If you have questions, please do not hesitate to call me. I look forward to following Aimee along with you.     Sincerely,        Seth Purdy RD, LD

## 2017-12-14 NOTE — PROGRESS NOTES
Activity.      -Patient Instructions  1.) Consider using your treadmill on a regular basis. The more active you are the better your blood sugars will be and keep weight controlled. 2.) Try to choose chicken breast or turkey breast instead of hot dogs and bologna for a lower fat option. 3.) Continue to drink mostly water throughout the day. Okay to do sugar free beverages as well. 4.) Continue to check your blood sugar twice a day. -General Diet Recommendations: low fat, low cholesterol, minimize processed foods, minimize simple sugars, increase fiber intake and carb counting.  -Nutrition prescription: 1600 calories/day, 180g carbs/day. Comprehension verified using teachback method. Monitoring/Evaluation:   -Followup visit: PRN  with dietitian.   -Receptiveness to education/goals: Agreeable.  -Evaluation of education: Indicates understanding.  -Readiness to change: contemplation - ambivalent about change use treadmill on a regular basis and action - ready to set action plan and implement choose lean protein choices, drink water throughout the day. -Expected compliance: fair. Thank you for your referral of this patient. Total time involved in direct patient education: 30 minutes for follow-up MNT visit.

## 2017-12-15 NOTE — COMMUNICATION BODY
Assessment:     Vitals from current and previous visits:  Current visit:  Ht: 5' 4\"  Wt: 210 lbs    Previous visit:  Ht: 5' 4\"  Wt: 212.2 lbs  -Body mass index is 36.18 kg/m². 35-39.9 - Obesity Grade II.   - Patient lost 2 lbs kim the past three months.  -Weight goal: lose weight. Nutrition Diagnosis:   Limited adherence to nutrition-related recommendations related to Lack of/restricted food access as evidenced by Patient report of limited food in the house d/t limited income at this time and food recall reveals increased intake of processed foods and lacking intake of fruits, vegetables and whole grain choices. Plan:     Intervention:  -Impression: Pt reports limited income at this time, so not keeping as many mindful food options in the household at this time. Also reports limited physical activity d/t lack of motivation.   -Instructed the patient on: Plate Method and The Importance of Regular Physical Activity.      -Patient Instructions  1.) Consider using your treadmill on a regular basis. The more active you are the better your blood sugars will be and keep weight controlled. 2.) Try to choose chicken breast or turkey breast instead of hot dogs and bologna for a lower fat option. 3.) Continue to drink mostly water throughout the day. Okay to do sugar free beverages as well. 4.) Continue to check your blood sugar twice a day. -General Diet Recommendations: low fat, low cholesterol, minimize processed foods, minimize simple sugars, increase fiber intake and carb counting.  -Nutrition prescription: 1600 calories/day, 180g carbs/day. Comprehension verified using teachback method.     Monitoring/Evaluation:   -Followup visit: PRN  with dietitian.   -Receptiveness to education/goals: Agreeable.  -Evaluation of education: Indicates understanding.  -Readiness to change: contemplation - ambivalent about change use treadmill on a regular basis and action - ready to set action plan and implement choose

## 2017-12-28 ENCOUNTER — HOSPITAL ENCOUNTER (OUTPATIENT)
Dept: PHARMACY | Age: 52
Setting detail: THERAPIES SERIES
Discharge: HOME | End: 2017-12-28
Payer: COMMERCIAL

## 2017-12-28 DIAGNOSIS — I26.99 OTHER PULMONARY EMBOLISM WITHOUT ACUTE COR PULMONALE, UNSPECIFIED CHRONICITY (HCC): ICD-10-CM

## 2017-12-28 LAB — POC INR: 2.8 (ref 0.8–1.2)

## 2017-12-28 PROCEDURE — 99211 OFF/OP EST MAY X REQ PHY/QHP: CPT

## 2017-12-28 PROCEDURE — 85610 PROTHROMBIN TIME: CPT

## 2017-12-28 PROCEDURE — 36416 COLLJ CAPILLARY BLOOD SPEC: CPT

## 2017-12-28 RX ORDER — ALENDRONATE SODIUM 70 MG/1
70 TABLET ORAL
COMMUNITY
Start: 2017-10-16

## 2017-12-28 NOTE — PROGRESS NOTES
[facility]  Anticoagulation Clinic  [phone] (phone)  [phone] (fax)    Ms. Adam Aragon is a 46 y.o.  female with history of PE who presents today for anticoagulation monitoring and adjustment. Patient verifies current dosing regimen and tablet strength. No missed or extra doses. Patient denies s/s bleeding/swelling/SOB/chest pain. Has 3 small bruises on left leg, origin unknown. No blood in urine or stool. No dietary changes. No changes in medication/OTC agents/Herbals. No change in alcohol use or tobacco use. No change in activity level. Patient denies headaches/dizziness/lightheadedness/falls. No vomiting/diarrhea or acute illness. No Procedures scheduled in the future at this time. Lab Results   Component Value Date    INR 2.80 (H) 12/28/2017    INR 2.70 (H) 11/30/2017    INR 2.20 (H) 11/09/2017     Plan: POCT INR ordered and result reviewed. Continue Coumadin 10 mg po MWF, 15 mg po TTHSS. Recheck INR 4 weeks. Patient reminded to call the Anticoagulation Clinic with any signs or symptoms of bleeding or with any medication changes. Patient given instructions utilizing the teach back method. Discharged ambulatory in no apparent distress. After visit summary printed and reviewed with patient.       Medications reviewed and updated on home medication list Yes    Influenza vaccine:     [] given    [] declined   [x] received previously   [] plans to receive at a later time   [] refused    [x] documented in EPIC

## 2018-01-25 ENCOUNTER — HOSPITAL ENCOUNTER (OUTPATIENT)
Dept: PHARMACY | Age: 53
Setting detail: THERAPIES SERIES
Discharge: HOME | End: 2018-01-25
Payer: COMMERCIAL

## 2018-01-25 DIAGNOSIS — I26.99 OTHER PULMONARY EMBOLISM WITHOUT ACUTE COR PULMONALE, UNSPECIFIED CHRONICITY (HCC): ICD-10-CM

## 2018-01-25 LAB — POC INR: 2 (ref 0.8–1.2)

## 2018-01-25 PROCEDURE — 85610 PROTHROMBIN TIME: CPT

## 2018-01-25 PROCEDURE — 99211 OFF/OP EST MAY X REQ PHY/QHP: CPT

## 2018-01-25 PROCEDURE — 36416 COLLJ CAPILLARY BLOOD SPEC: CPT

## 2018-02-28 ENCOUNTER — HOSPITAL ENCOUNTER (OUTPATIENT)
Dept: PHARMACY | Age: 53
Setting detail: THERAPIES SERIES
Discharge: HOME | End: 2018-02-28
Payer: COMMERCIAL

## 2018-02-28 DIAGNOSIS — I26.99 OTHER PULMONARY EMBOLISM WITHOUT ACUTE COR PULMONALE, UNSPECIFIED CHRONICITY (HCC): ICD-10-CM

## 2018-02-28 LAB — POC INR: 2.1 (ref 0.8–1.2)

## 2018-02-28 PROCEDURE — 85610 PROTHROMBIN TIME: CPT

## 2018-02-28 PROCEDURE — 36416 COLLJ CAPILLARY BLOOD SPEC: CPT

## 2018-02-28 PROCEDURE — 99211 OFF/OP EST MAY X REQ PHY/QHP: CPT

## 2018-02-28 RX ORDER — MONTELUKAST SODIUM 10 MG/1
10 TABLET ORAL DAILY
COMMUNITY

## 2018-04-04 ENCOUNTER — HOSPITAL ENCOUNTER (OUTPATIENT)
Dept: PHARMACY | Age: 53
Setting detail: THERAPIES SERIES
Discharge: HOME | End: 2018-04-04
Payer: COMMERCIAL

## 2018-04-04 DIAGNOSIS — I26.99 OTHER PULMONARY EMBOLISM WITHOUT ACUTE COR PULMONALE, UNSPECIFIED CHRONICITY (HCC): ICD-10-CM

## 2018-04-04 LAB — POC INR: 3 (ref 0.8–1.2)

## 2018-04-04 PROCEDURE — 99211 OFF/OP EST MAY X REQ PHY/QHP: CPT

## 2018-04-04 PROCEDURE — 85610 PROTHROMBIN TIME: CPT

## 2018-04-04 PROCEDURE — 36416 COLLJ CAPILLARY BLOOD SPEC: CPT

## 2018-04-08 ENCOUNTER — HOSPITAL ENCOUNTER (EMERGENCY)
Age: 53
Discharge: OTHER INSTITUTION | End: 2018-04-08
Attending: EMERGENCY MEDICINE
Payer: COMMERCIAL

## 2018-04-08 VITALS
WEIGHT: 215.2 LBS | DIASTOLIC BLOOD PRESSURE: 68 MMHG | SYSTOLIC BLOOD PRESSURE: 107 MMHG | OXYGEN SATURATION: 99 % | BODY MASS INDEX: 36.74 KG/M2 | HEART RATE: 88 BPM | HEIGHT: 64 IN | RESPIRATION RATE: 18 BRPM | TEMPERATURE: 99.6 F

## 2018-04-08 DIAGNOSIS — E11.69 DIABETES MELLITUS TYPE 2 IN OBESE (HCC): ICD-10-CM

## 2018-04-08 DIAGNOSIS — R10.9 ACUTE ABDOMINAL PAIN: Primary | ICD-10-CM

## 2018-04-08 DIAGNOSIS — E66.9 DIABETES MELLITUS TYPE 2 IN OBESE (HCC): ICD-10-CM

## 2018-04-08 DIAGNOSIS — Z79.01 WARFARIN ANTICOAGULATION: ICD-10-CM

## 2018-04-08 PROCEDURE — 99215 OFFICE O/P EST HI 40 MIN: CPT

## 2018-04-08 PROCEDURE — 99214 OFFICE O/P EST MOD 30 MIN: CPT | Performed by: EMERGENCY MEDICINE

## 2018-04-08 ASSESSMENT — ENCOUNTER SYMPTOMS
SHORTNESS OF BREATH: 0
EYE REDNESS: 0
EYE PAIN: 0
CONSTIPATION: 0
VOICE CHANGE: 0
ABDOMINAL PAIN: 1
SORE THROAT: 0
FACIAL SWELLING: 0
WHEEZING: 0
CHOKING: 0
COUGH: 0
TROUBLE SWALLOWING: 0
EYE DISCHARGE: 0
STRIDOR: 0
NAUSEA: 1
BACK PAIN: 0
VOMITING: 1
SINUS PRESSURE: 0
DIARRHEA: 1
BLOOD IN STOOL: 0

## 2018-04-08 ASSESSMENT — PAIN DESCRIPTION - DESCRIPTORS: DESCRIPTORS: SHARP

## 2018-04-08 ASSESSMENT — PAIN SCALES - GENERAL: PAINLEVEL_OUTOF10: 6

## 2018-04-08 ASSESSMENT — PAIN DESCRIPTION - LOCATION: LOCATION: ABDOMEN

## 2018-04-08 ASSESSMENT — PAIN DESCRIPTION - PAIN TYPE: TYPE: ACUTE PAIN

## 2018-04-08 ASSESSMENT — PAIN DESCRIPTION - FREQUENCY: FREQUENCY: CONTINUOUS

## 2018-05-09 ENCOUNTER — HOSPITAL ENCOUNTER (OUTPATIENT)
Dept: PHARMACY | Age: 53
Setting detail: THERAPIES SERIES
Discharge: HOME | End: 2018-05-09
Payer: COMMERCIAL

## 2018-05-09 ENCOUNTER — HOSPITAL ENCOUNTER (OUTPATIENT)
Age: 53
Discharge: HOME | End: 2018-05-09
Payer: COMMERCIAL

## 2018-05-09 DIAGNOSIS — I26.99 OTHER PULMONARY EMBOLISM WITHOUT ACUTE COR PULMONALE, UNSPECIFIED CHRONICITY (HCC): ICD-10-CM

## 2018-05-09 LAB
HCT VFR BLD CALC: 36.8 % (ref 37–47)
HEMOGLOBIN: 12.2 GM/DL (ref 12–16)
POC INR: 4 (ref 0.8–1.2)

## 2018-05-09 PROCEDURE — 36415 COLL VENOUS BLD VENIPUNCTURE: CPT

## 2018-05-09 PROCEDURE — 85018 HEMOGLOBIN: CPT

## 2018-05-09 PROCEDURE — 85610 PROTHROMBIN TIME: CPT

## 2018-05-09 PROCEDURE — 99211 OFF/OP EST MAY X REQ PHY/QHP: CPT

## 2018-05-09 PROCEDURE — 85014 HEMATOCRIT: CPT

## 2018-05-09 PROCEDURE — 36416 COLLJ CAPILLARY BLOOD SPEC: CPT

## 2018-05-30 ENCOUNTER — HOSPITAL ENCOUNTER (OUTPATIENT)
Dept: PHARMACY | Age: 53
Setting detail: THERAPIES SERIES
Discharge: HOME | End: 2018-05-30
Payer: COMMERCIAL

## 2018-05-30 DIAGNOSIS — I26.99 OTHER PULMONARY EMBOLISM WITHOUT ACUTE COR PULMONALE, UNSPECIFIED CHRONICITY (HCC): ICD-10-CM

## 2018-05-30 LAB — POC INR: 2.1 (ref 0.8–1.2)

## 2018-05-30 PROCEDURE — 85610 PROTHROMBIN TIME: CPT

## 2018-05-30 PROCEDURE — 36416 COLLJ CAPILLARY BLOOD SPEC: CPT

## 2018-05-30 PROCEDURE — 99211 OFF/OP EST MAY X REQ PHY/QHP: CPT

## 2018-06-05 RX ORDER — WARFARIN SODIUM 5 MG/1
TABLET ORAL
Qty: 270 TABLET | Refills: 3 | Status: SHIPPED | OUTPATIENT
Start: 2018-06-05 | End: 2020-03-10 | Stop reason: SDUPTHER

## 2018-06-11 ENCOUNTER — INITIAL CONSULT (OUTPATIENT)
Dept: PULMONOLOGY | Age: 53
End: 2018-06-11
Payer: COMMERCIAL

## 2018-06-11 VITALS
SYSTOLIC BLOOD PRESSURE: 118 MMHG | WEIGHT: 214 LBS | BODY MASS INDEX: 36.54 KG/M2 | HEIGHT: 64 IN | DIASTOLIC BLOOD PRESSURE: 68 MMHG | OXYGEN SATURATION: 98 % | HEART RATE: 94 BPM

## 2018-06-11 DIAGNOSIS — E66.9 OBESITY (BMI 30-39.9): ICD-10-CM

## 2018-06-11 DIAGNOSIS — G47.33 OSA (OBSTRUCTIVE SLEEP APNEA): Primary | ICD-10-CM

## 2018-06-11 DIAGNOSIS — R06.83 SNORING: ICD-10-CM

## 2018-06-11 PROCEDURE — 99203 OFFICE O/P NEW LOW 30 MIN: CPT | Performed by: INTERNAL MEDICINE

## 2018-06-27 ENCOUNTER — HOSPITAL ENCOUNTER (OUTPATIENT)
Dept: PHARMACY | Age: 53
Setting detail: THERAPIES SERIES
Discharge: HOME | End: 2018-06-27
Payer: COMMERCIAL

## 2018-06-27 DIAGNOSIS — I26.99 OTHER PULMONARY EMBOLISM WITHOUT ACUTE COR PULMONALE, UNSPECIFIED CHRONICITY (HCC): ICD-10-CM

## 2018-06-27 LAB — POC INR: 2.7 (ref 0.8–1.2)

## 2018-06-27 PROCEDURE — 36416 COLLJ CAPILLARY BLOOD SPEC: CPT

## 2018-06-27 PROCEDURE — 99211 OFF/OP EST MAY X REQ PHY/QHP: CPT

## 2018-06-27 PROCEDURE — 85610 PROTHROMBIN TIME: CPT

## 2018-06-29 ENCOUNTER — HOSPITAL ENCOUNTER (OUTPATIENT)
Dept: SLEEP CENTER | Age: 53
Discharge: HOME | End: 2018-07-01
Payer: COMMERCIAL

## 2018-06-29 DIAGNOSIS — R06.83 SNORING: ICD-10-CM

## 2018-06-29 DIAGNOSIS — E66.9 OBESITY (BMI 30-39.9): ICD-10-CM

## 2018-06-29 DIAGNOSIS — G47.33 OSA (OBSTRUCTIVE SLEEP APNEA): ICD-10-CM

## 2018-06-29 PROCEDURE — 95810 POLYSOM 6/> YRS 4/> PARAM: CPT

## 2018-07-02 LAB — STATUS: NORMAL

## 2018-07-09 ENCOUNTER — OFFICE VISIT (OUTPATIENT)
Dept: PULMONOLOGY | Age: 53
End: 2018-07-09
Payer: COMMERCIAL

## 2018-07-09 VITALS
SYSTOLIC BLOOD PRESSURE: 132 MMHG | HEIGHT: 64 IN | OXYGEN SATURATION: 99 % | HEART RATE: 62 BPM | BODY MASS INDEX: 36.84 KG/M2 | WEIGHT: 215.8 LBS | DIASTOLIC BLOOD PRESSURE: 80 MMHG

## 2018-07-09 DIAGNOSIS — G47.33 OSA (OBSTRUCTIVE SLEEP APNEA): Primary | ICD-10-CM

## 2018-07-09 PROCEDURE — 99213 OFFICE O/P EST LOW 20 MIN: CPT | Performed by: INTERNAL MEDICINE

## 2018-07-09 NOTE — PROGRESS NOTES
Sleep Medicine    Rosalinda Chase, 48 y.o.  [phone]    Nurses Notes   Adrian West is here for a follow-up after a baseline sleep study. Study Results    Initial Study Date -  6/29/18  AHI -  5.1    Total Events - 33  (Apneas  0  Hypopneas 33  Central  0)  LM w/Arousals - 0.8  Sleep Efficiency - 84.6 % (Total Sleep Time - 386.6 min)  Time with Sats below 88% - 0.1 min  Oxygen level - Room Air  99  ESS: 4  INTERVAL HISTORY         Rosalinda Chase is a 48 y.o. old female who comes in for follow up regarding her obstructive sleep apnea. To review polysomnogram while wearing her mandibular advancement device, she was provided with adjustment tool by Dr Irina Biswas however when Aimee positioned her device she noted that she could close her mouth too easily and she realized she had turned the tool wrong way, but she believes she corrected it before the PSG was started. Past Medical History:   Diagnosis Date    Asthma     Depression     Diverticulitis     History of deep vein thrombophlebitis of lower extremity     Osteopenia     PE (pulmonary embolism)     x2    Plantar fasciitis, right 3/2016    Sleep apnea     tested Dec 2013 needs CPAP yet    Type 2 diabetes mellitus without complication ([facility] [address] 24/67/8070     Past Surgical History:   Procedure Laterality Date    ANKLE SURGERY      Left Ankle.  plate    OTHER SURGICAL HISTORY      right wrist for tendonitis    VENA CAVA FILTER PLACEMENT         Social History   Substance Use Topics    Smoking status: Never Smoker    Smokeless tobacco: Never Used    Alcohol use Yes      Comment: Ocassionally       ALLERGIES  No Known Allergies  Current Outpatient Prescriptions   Medication Sig Dispense Refill    warfarin (COUMADIN) 5 MG tablet TAKE AS DIRECTED BY [facility] 270 tablet 3    Triamcinolone Acetonide (NASACORT AQ NA) by Nasal route See Admin Instructions      montelukast (SINGULAIR) 10 MG tablet Take 10 mg by mouth daily      alendronate (FOSAMAX) 70 MG tablet Take 70 mg by mouth every 28 days       metFORMIN (GLUCOPHAGE) 500 MG tablet take 1 tablet by mouth three times a day with food  0    lisinopril (PRINIVIL;ZESTRIL) 5 MG tablet Take 5 mg by mouth daily Indications: Diabetes, Raised Blood Pressure      atorvastatin (LIPITOR) 20 MG tablet Take 20 mg by mouth daily Indications: Blood Cholesterol Abnormal      Albuterol Sulfate (VENTOLIN HFA IN) Inhale 2 puffs into the lungs every 4 hours as needed Indications: Asthma      Calcium Carbonate (CALTRATE 600 PO) Take 2 tablets by mouth daily supplement      Acetaminophen (TYLENOL PO) Take  by mouth as needed. Don't take more than 3,000 mg per day. Indications: Pain       No current facility-administered medications for this visit. Family History   Problem Relation Age of Onset    Cancer Mother         breast    High Blood Pressure Sister     High Cholesterol Sister     Diabetes Brother     Stroke Maternal Grandmother         EXAM  Vitals -  /80 (Site: Left Arm, Position: Sitting)   Pulse 62   Ht 5' 4\" (1.626 m)   Wt 215 lb 12.8 oz (97.9 kg)   SpO2 99%   BMI 37.04 kg/m²    Body mass index is 37.04 kg/m². Oxygen level - RA    ESS: 4    Constitutional - No distress. Patient is oriented to person, place, and time. Head  - Normocephalic and atraumatic. Mouth/Throat - Oropharynx is clear and moist.   Dentition - good  Mallampati Score - I (soft palate, uvula, fauces, tonsillar pillars visible)  Cardiovascular - Normal rate, regular rhythm, normal heart sounds. No murmur heard. Pulmonary/Chest -  Effort normal and breath sounds normal.   Neurological - Patient is alert and oriented to person, place, and time. Psychiatric - Patient  has a normal mood and affect. PSG with oral appliance       [address] [place] [address]                                 SLEEP STUDY REPORT     PATIENT NAME: Karely Kong

## 2018-07-09 NOTE — LETTER
Substance Use Topics    Smoking status: Never Smoker    Smokeless tobacco: Never Used    Alcohol use Yes      Comment: Ocassionally       ALLERGIES  No Known Allergies  Current Outpatient Prescriptions   Medication Sig Dispense Refill    warfarin (COUMADIN) 5 MG tablet TAKE AS DIRECTED BY [facility] 270 tablet 3    Triamcinolone Acetonide (NASACORT AQ NA) by Nasal route See Admin Instructions      montelukast (SINGULAIR) 10 MG tablet Take 10 mg by mouth daily      alendronate (FOSAMAX) 70 MG tablet Take 70 mg by mouth every 28 days       metFORMIN (GLUCOPHAGE) 500 MG tablet take 1 tablet by mouth three times a day with food  0    lisinopril (PRINIVIL;ZESTRIL) 5 MG tablet Take 5 mg by mouth daily Indications: Diabetes, Raised Blood Pressure      atorvastatin (LIPITOR) 20 MG tablet Take 20 mg by mouth daily Indications: Blood Cholesterol Abnormal      Albuterol Sulfate (VENTOLIN HFA IN) Inhale 2 puffs into the lungs every 4 hours as needed Indications: Asthma      Calcium Carbonate (CALTRATE 600 PO) Take 2 tablets by mouth daily supplement      Acetaminophen (TYLENOL PO) Take  by mouth as needed. Don't take more than 3,000 mg per day. Indications: Pain       No current facility-administered medications for this visit. Family History   Problem Relation Age of Onset    Cancer Mother         breast    High Blood Pressure Sister     High Cholesterol Sister     Diabetes Brother     Stroke Maternal Grandmother         EXAM  Vitals -  /80 (Site: Left Arm, Position: Sitting)   Pulse 62   Ht 5' 4\" (1.626 m)   Wt 215 lb 12.8 oz (97.9 kg)   SpO2 99%   BMI 37.04 kg/m²     Body mass index is 37.04 kg/m². Oxygen level - RA    ESS: 4    Constitutional - No distress. Patient is oriented to person, place, and time. Head  - Normocephalic and atraumatic.    Mouth/Throat - Oropharynx is clear and moist.   Dentition - good  Mallampati Score - I (soft palate, uvula, fauces, tonsillar pillars visible)  Cardiovascular - Normal rate, regular rhythm, normal heart sounds. No murmur heard. Pulmonary/Chest -  Effort normal and breath sounds normal.   Neurological - Patient is alert and oriented to person, place, and time. Psychiatric - Patient  has a normal mood and affect. PSG with oral appliance       [address] [place] [address]                                 SLEEP STUDY REPORT     PATIENT NAME: Sonia Iverson                   :        1965  MED REC NO:   [id number]                           ROOM:  ACCOUNT NO:   [de-identified]                           ADMIT DATE: 2018  PROVIDER:     Lupillo Fofana M.D.     DIAGNOSTIC POLYSOMNOGRAM.     DATE OF STUDY:  2018     REFERRING PROVIDER:  Dr. Haris Resendiz.     HISTORY OF PRESENT ILLNESS:  The patient is a 59-year-old female who was  diagnosed with moderate obstructive sleep apnea by Dr. Jacqueline Rai, via  polysomnogram dated 2015, with an AHI of 23.7, the patient did not  tolerated PAP therapy and currently she is using a mandibular advancement device with the  auspices of Dr. Franchesca Vasquez DDS. Osei Choudhury comes back for followup  polysomnogram to see how well she is responding to therapies.   The  patient's weight 214 pounds, height 64 inches, BMI 36.7.     METHODS:  The patient underwent digital polysomnography in compliance with  the standards and specifications from the AASM Manual including the  simultaneous recording of 3 EEG channels (F4-M1, C4-M1, and O2-M1 with back  up electrodes F3-M2, C3-M2, and O1-M2), 2 EOG channels (E1-M2, and E2-M1,),  EMG (chin, left & right leg), EKG, Nonin pulse oximetry with less than 2  second averaging time, body position, airflow recorded by oral-nasal  thermal sensor and nasal air pressure transducer, plus respiratory effort  recorded by calibrated respiratory inductance plethysmography (RIP), flow TERESA seems to be appropriately controlled with the oral appliance.           Nany Chandler M.D.     D: 07/08/2018 18:34:49       T: 07/09/2018 0:47:45     [email or web address]  Job#: [id number]     Doc#: [id number]     CC:    ASSESSMENT  Obstructive Sleep Apnea (TERESA) controlled with mandibular advancement device      RECOMMENDATIONS    Refer back to Dr Alysa Aleman with copy of PSG    FOLLOW UP     RTC prn          If you have questions, please do not hesitate to call me. I look forward to following Aimee along with you. Sincerely,        Ayo Lao MD    CC providers:   [address], DDS  [address] [place] [address]  VIA Mail

## 2018-07-09 NOTE — COMMUNICATION BODY
:        1965  MED REC NO:   [id number]                           ROOM:  ACCOUNT NO:   [de-identified]                           ADMIT DATE: 2018  PROVIDER:     Goldie Klinefelter, M.D.     DIAGNOSTIC POLYSOMNOGRAM.     DATE OF STUDY:  2018     REFERRING PROVIDER:  Dr. Charmian Halsted.     HISTORY OF PRESENT ILLNESS:  The patient is a 51-year-old female who was  diagnosed with moderate obstructive sleep apnea by Dr. Meryl Law, via  polysomnogram dated 2015, with an AHI of 23.7, the patient did not  tolerated PAP therapy and currently she is using a mandibular advancement device with the  auspices of Dr. Adithya Syed DDS. Devante Hilton comes back for followup  polysomnogram to see how well she is responding to therapies. The  patient's weight 214 pounds, height 64 inches, BMI 36.7.     METHODS:  The patient underwent digital polysomnography in compliance with  the standards and specifications from the AASM Manual including the  simultaneous recording of 3 EEG channels (F4-M1, C4-M1, and O2-M1 with back  up electrodes F3-M2, C3-M2, and O1-M2), 2 EOG channels (E1-M2, and E2-M1,),  EMG (chin, left & right leg), EKG, Nonin pulse oximetry with less than 2  second averaging time, body position, airflow recorded by oral-nasal  thermal sensor and nasal air pressure transducer, plus respiratory effort  recorded by calibrated respiratory inductance plethysmography (RIP), flow  volume loop, sound and video. Sleep staging and scoring followed the  standard put forth by the American Academy of Sleep Medicine and utilized  the 4A obstructive hypopnea event desaturation of 4 percent or greater. This is a diagnostic polysomnogram done with the use of oral appliance.     DETAILS OF THE STUDY:  Lights out 09:27 p.m. Lights on 05:04 a.m. Total  recording time 456 minutes. A sleep period time 402 minutes. Total sleep  time 386 minutes. Sleep efficiency 84.6%. Latency to sleep 54 minutes.    Wake after sleep onset

## 2018-07-25 ENCOUNTER — HOSPITAL ENCOUNTER (OUTPATIENT)
Dept: PHARMACY | Age: 53
Setting detail: THERAPIES SERIES
Discharge: HOME | End: 2018-07-25
Payer: COMMERCIAL

## 2018-07-25 DIAGNOSIS — I26.99 OTHER PULMONARY EMBOLISM WITHOUT ACUTE COR PULMONALE, UNSPECIFIED CHRONICITY (HCC): ICD-10-CM

## 2018-07-25 LAB — POC INR: 2.7 (ref 0.8–1.2)

## 2018-07-25 PROCEDURE — 36416 COLLJ CAPILLARY BLOOD SPEC: CPT

## 2018-07-25 PROCEDURE — 99211 OFF/OP EST MAY X REQ PHY/QHP: CPT

## 2018-07-25 PROCEDURE — 85610 PROTHROMBIN TIME: CPT

## 2018-08-22 ENCOUNTER — HOSPITAL ENCOUNTER (OUTPATIENT)
Dept: PHARMACY | Age: 53
Setting detail: THERAPIES SERIES
Discharge: HOME | End: 2018-08-22
Payer: COMMERCIAL

## 2018-08-22 DIAGNOSIS — I26.99 OTHER PULMONARY EMBOLISM WITHOUT ACUTE COR PULMONALE, UNSPECIFIED CHRONICITY (HCC): ICD-10-CM

## 2018-08-22 LAB — POC INR: 3.3 (ref 0.8–1.2)

## 2018-08-22 PROCEDURE — 36416 COLLJ CAPILLARY BLOOD SPEC: CPT

## 2018-08-22 PROCEDURE — 85610 PROTHROMBIN TIME: CPT

## 2018-08-22 PROCEDURE — 99211 OFF/OP EST MAY X REQ PHY/QHP: CPT

## 2018-08-22 NOTE — PROGRESS NOTES
Medication Management [facility]  Anticoagulation Clinic  [phone] (phone)  [phone] (fax)      Ms. Sandra Handy is a 48 y.o.  female with history of  PE, DVT  who presents today for anticoagulation monitoring and adjustment. Patient verifies current dosing regimen and tablet strength. No missed or extra doses. Patient denies s/s bleeding/bruising/swelling/SOB/chest pain-small bruise on upper arm   No blood in urine or stool. No dietary changes. No changes in medication/OTC agents/Herbals.- Prescribed her B-complex and a probiotic   No change in alcohol use or tobacco use. No change in activity level.-patient reports she walks about 4x a week now  Patient denies headaches/dizziness/lightheadedness/falls. No vomiting/diarrhea or acute illness. -has been having diarrhea 4x per day. Patient started metformin in may. No Procedures scheduled in the future at this time. Assessment:   Lab Results   Component Value Date    INR 3.30 (H) 08/22/2018    INR 2.70 (H) 07/25/2018    INR 2.70 (H) 06/27/2018     INR supratherapeutic   Recent Labs      08/22/18   1540   INR  3.30*         Plan:  Coumadin 5 mg today then continue Coumadin 10 mg MWF, 15 mg TThSS. Recheck INR 4 weeks. Patient reminded to call the Anticoagulation Clinic with any signs or symptoms of bleeding or with any medication changes. Patient given instructions utilizing the teach back method. Discharged ambulatory in no apparent distress. Assessment and plan reviewed with Kody Gil. After visit summary printed and reviewed with patient.       Medications reviewed and updated on home medication list Yes    Influenza vaccine:     [] given    [x] declined   [x] received previously   [x] plans to receive at a later time   [] refused    [x] documented in EPIC

## 2018-09-19 ENCOUNTER — HOSPITAL ENCOUNTER (OUTPATIENT)
Dept: PHARMACY | Age: 53
Setting detail: THERAPIES SERIES
Discharge: HOME | End: 2018-09-19
Payer: COMMERCIAL

## 2018-09-19 DIAGNOSIS — I26.99 OTHER PULMONARY EMBOLISM WITHOUT ACUTE COR PULMONALE, UNSPECIFIED CHRONICITY (HCC): ICD-10-CM

## 2018-09-19 LAB — POC INR: 2 (ref 0.8–1.2)

## 2018-09-19 PROCEDURE — 99211 OFF/OP EST MAY X REQ PHY/QHP: CPT

## 2018-09-19 PROCEDURE — 85610 PROTHROMBIN TIME: CPT

## 2018-09-19 PROCEDURE — 36416 COLLJ CAPILLARY BLOOD SPEC: CPT

## 2018-09-19 NOTE — PROGRESS NOTES
Medication Management [facility]  Anticoagulation Clinic  [phone] (phone)  [phone] (fax)    Ms. Tila Dimas is a 48 y.o.  female with history of PE who presents today for anticoagulation monitoring and adjustment. Patient verifies current dosing regimen and tablet strength. No missed or extra doses. Patient denies s/s bleeding/bruising/swelling/SOB/chest pain. No blood in urine or stool. No dietary changes. No changes in medication/OTC agents/Herbals. No change in alcohol use or tobacco use. No change in activity level. Patient denies dizziness/lightheadedness/falls. Rare headache. No vomiting/diarrhea or acute illness. No procedures scheduled in the future at this time. Assessment:   Lab Results   Component Value Date    INR 2.00 (H) 09/19/2018    INR 3.30 (H) 08/22/2018    INR 2.70 (H) 07/25/2018     INR therapeutic   Recent Labs      09/19/18   1602   INR  2.00*     Plan: POCT INR ordered and result reviewed. Continue Coumadin 10 mg po MWF, 15 mg po TTHSS. Recheck INR 4 weeks. Patient reminded to call the Anticoagulation Clinic with any signs or symptoms of bleeding or with any medication changes. Patient given instructions utilizing the teach back method. Discharged ambulatory in no apparent distress. After visit summary printed and reviewed with patient.       Medications reviewed and updated on home medication list Yes    Influenza vaccine:     [] given    [] declined   [x] received previously   [] plans to receive at a later time   [] refused    [x] documented in EPIC

## 2018-10-16 ENCOUNTER — OFFICE VISIT (OUTPATIENT)
Dept: CARDIOLOGY CLINIC | Age: 53
End: 2018-10-16
Payer: COMMERCIAL

## 2018-10-16 VITALS
SYSTOLIC BLOOD PRESSURE: 126 MMHG | HEIGHT: 64 IN | WEIGHT: 221 LBS | HEART RATE: 67 BPM | BODY MASS INDEX: 37.73 KG/M2 | DIASTOLIC BLOOD PRESSURE: 78 MMHG

## 2018-10-16 DIAGNOSIS — R00.1 SINUS BRADYCARDIA: Primary | ICD-10-CM

## 2018-10-16 DIAGNOSIS — R06.02 SOB (SHORTNESS OF BREATH) ON EXERTION: ICD-10-CM

## 2018-10-16 DIAGNOSIS — G47.33 OSA (OBSTRUCTIVE SLEEP APNEA): ICD-10-CM

## 2018-10-16 DIAGNOSIS — R93.1 ABNORMAL ECHOCARDIOGRAM: ICD-10-CM

## 2018-10-16 PROCEDURE — 93000 ELECTROCARDIOGRAM COMPLETE: CPT | Performed by: INTERNAL MEDICINE

## 2018-10-16 PROCEDURE — 99213 OFFICE O/P EST LOW 20 MIN: CPT | Performed by: INTERNAL MEDICINE

## 2018-10-17 ENCOUNTER — HOSPITAL ENCOUNTER (OUTPATIENT)
Dept: PHARMACY | Age: 53
Setting detail: THERAPIES SERIES
Discharge: HOME | End: 2018-10-17
Payer: COMMERCIAL

## 2018-10-17 DIAGNOSIS — I26.99 OTHER PULMONARY EMBOLISM WITHOUT ACUTE COR PULMONALE, UNSPECIFIED CHRONICITY (HCC): ICD-10-CM

## 2018-10-17 LAB — POC INR: 2.4 (ref 0.8–1.2)

## 2018-10-17 PROCEDURE — 36416 COLLJ CAPILLARY BLOOD SPEC: CPT

## 2018-10-17 PROCEDURE — 90686 IIV4 VACC NO PRSV 0.5 ML IM: CPT | Performed by: INTERNAL MEDICINE

## 2018-10-17 PROCEDURE — 85610 PROTHROMBIN TIME: CPT

## 2018-10-17 PROCEDURE — 6360000002 HC RX W HCPCS: Performed by: INTERNAL MEDICINE

## 2018-10-17 PROCEDURE — G0463 HOSPITAL OUTPT CLINIC VISIT: HCPCS

## 2018-10-17 PROCEDURE — G0008 ADMIN INFLUENZA VIRUS VAC: HCPCS | Performed by: INTERNAL MEDICINE

## 2018-10-17 RX ORDER — VITAMIN B COMPLEX
1 CAPSULE ORAL DAILY
COMMUNITY
End: 2019-01-02 | Stop reason: ALTCHOICE

## 2018-10-17 RX ADMIN — INFLUENZA A VIRUS A/MICHIGAN/45/2015 X-275 (H1N1) ANTIGEN (FORMALDEHYDE INACTIVATED), INFLUENZA A VIRUS A/SINGAPORE/INFIMH-16-0019/2016 IVR-186 (H3N2) ANTIGEN (FORMALDEHYDE INACTIVATED), INFLUENZA B VIRUS B/PHUKET/3073/2013 ANTIGEN (FORMALDEHYDE INACTIVATED), AND INFLUENZA B VIRUS B/MARYLAND/15/2016 BX-69A ANTIGEN (FORMALDEHYDE INACTIVATED) 0.5 ML: 15; 15; 15; 15 INJECTION, SUSPENSION INTRAMUSCULAR at 16:10

## 2018-10-26 ENCOUNTER — APPOINTMENT (OUTPATIENT)
Dept: GENERAL RADIOLOGY | Age: 53
End: 2018-10-26
Payer: COMMERCIAL

## 2018-10-26 ENCOUNTER — HOSPITAL ENCOUNTER (EMERGENCY)
Age: 53
Discharge: HOME | End: 2018-10-26
Payer: COMMERCIAL

## 2018-10-26 VITALS
BODY MASS INDEX: 36.88 KG/M2 | TEMPERATURE: 98.5 F | HEIGHT: 64 IN | RESPIRATION RATE: 16 BRPM | OXYGEN SATURATION: 99 % | HEART RATE: 68 BPM | DIASTOLIC BLOOD PRESSURE: 87 MMHG | WEIGHT: 216 LBS | SYSTOLIC BLOOD PRESSURE: 138 MMHG
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
## 2018-10-26 PROCEDURE — 72072 X-RAY EXAM THORAC SPINE 3VWS: CPT

## 2018-10-26 PROCEDURE — 99283 EMERGENCY DEPT VISIT LOW MDM: CPT

## 2018-10-26 PROCEDURE — 6360000002 HC RX W HCPCS: Performed by: NURSE PRACTITIONER

## 2018-10-26 PROCEDURE — 96372 THER/PROPH/DIAG INJ SC/IM: CPT

## 2018-10-26 PROCEDURE — 6370000000 HC RX 637 (ALT 250 FOR IP): Performed by: NURSE PRACTITIONER

## 2018-10-26 RX ORDER — ACETAMINOPHEN 500 MG
1000 TABLET ORAL ONCE
Status: COMPLETED | OUTPATIENT
Start: 2018-10-26 | End: 2018-10-26

## 2018-10-26 RX ORDER — ORPHENADRINE CITRATE 100 MG/1
100 TABLET, EXTENDED RELEASE ORAL 2 TIMES DAILY
Qty: 20 TABLET | Refills: 0 | Status: SHIPPED | OUTPATIENT
Start: 2018-10-26 | End: 2018-11-05

## 2018-10-26 RX ORDER — BENZONATATE 100 MG/1
100 CAPSULE ORAL 3 TIMES DAILY PRN
COMMUNITY
End: 2019-01-02 | Stop reason: ALTCHOICE

## 2018-10-26 RX ORDER — ORPHENADRINE CITRATE 30 MG/ML
60 INJECTION INTRAMUSCULAR; INTRAVENOUS ONCE
Status: COMPLETED | OUTPATIENT
Start: 2018-10-26 | End: 2018-10-26

## 2018-10-26 RX ADMIN — ACETAMINOPHEN 1000 MG: 500 TABLET, FILM COATED ORAL at 18:59

## 2018-10-26 RX ADMIN — ORPHENADRINE CITRATE 60 MG: 30 INJECTION INTRAMUSCULAR; INTRAVENOUS at 19:00

## 2018-10-26 ASSESSMENT — PAIN DESCRIPTION - ORIENTATION: ORIENTATION: UPPER

## 2018-10-26 ASSESSMENT — ENCOUNTER SYMPTOMS
EYE PAIN: 0
NAUSEA: 0
COUGH: 0
VOMITING: 0
ABDOMINAL PAIN: 0
DIARRHEA: 0
SORE THROAT: 0
RHINORRHEA: 0
SHORTNESS OF BREATH: 0
WHEEZING: 0
EYE DISCHARGE: 0
BACK PAIN: 1

## 2018-10-26 ASSESSMENT — PAIN DESCRIPTION - PAIN TYPE: TYPE: ACUTE PAIN

## 2018-10-26 ASSESSMENT — PAIN DESCRIPTION - FREQUENCY: FREQUENCY: CONTINUOUS

## 2018-10-26 ASSESSMENT — PAIN SCALES - GENERAL
PAINLEVEL_OUTOF10: 7
PAINLEVEL_OUTOF10: 7

## 2018-10-26 ASSESSMENT — PAIN DESCRIPTION - DESCRIPTORS: DESCRIPTORS: TIGHTNESS

## 2018-10-26 ASSESSMENT — PAIN DESCRIPTION - LOCATION: LOCATION: BACK
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
## 2018-10-31 ENCOUNTER — HOSPITAL ENCOUNTER (OUTPATIENT)
Age: 53
Discharge: HOME | End: 2018-10-31
Payer: COMMERCIAL

## 2018-10-31 ENCOUNTER — HOSPITAL ENCOUNTER (OUTPATIENT)
Dept: GENERAL RADIOLOGY | Age: 53
Discharge: HOME | End: 2018-10-31
Payer: COMMERCIAL

## 2018-10-31 DIAGNOSIS — S33.5XXA LUMBAR SPRAIN, INITIAL ENCOUNTER: ICD-10-CM

## 2018-10-31 PROCEDURE — 72100 X-RAY EXAM L-S SPINE 2/3 VWS: CPT

## 2018-11-13 DIAGNOSIS — I26.99 OTHER ACUTE PULMONARY EMBOLISM WITHOUT ACUTE COR PULMONALE (HCC): Primary | ICD-10-CM

## 2018-11-14 ENCOUNTER — HOSPITAL ENCOUNTER (OUTPATIENT)
Dept: PHARMACY | Age: 53
Setting detail: THERAPIES SERIES
Discharge: HOME | End: 2018-11-14
Payer: COMMERCIAL

## 2018-11-14 DIAGNOSIS — I26.99 OTHER PULMONARY EMBOLISM WITHOUT ACUTE COR PULMONALE, UNSPECIFIED CHRONICITY (HCC): ICD-10-CM

## 2018-11-14 LAB — POC INR: 3.2 (ref 0.8–1.2)

## 2018-11-14 PROCEDURE — 99211 OFF/OP EST MAY X REQ PHY/QHP: CPT

## 2018-11-14 PROCEDURE — 85610 PROTHROMBIN TIME: CPT

## 2018-11-14 PROCEDURE — 36416 COLLJ CAPILLARY BLOOD SPEC: CPT

## 2018-12-05 ENCOUNTER — HOSPITAL ENCOUNTER (OUTPATIENT)
Dept: PHARMACY | Age: 53
Setting detail: THERAPIES SERIES
Discharge: HOME | End: 2018-12-05
Payer: COMMERCIAL

## 2018-12-05 DIAGNOSIS — I26.99 OTHER PULMONARY EMBOLISM WITHOUT ACUTE COR PULMONALE, UNSPECIFIED CHRONICITY (HCC): ICD-10-CM

## 2018-12-05 LAB — POC INR: 2.7 (ref 0.8–1.2)

## 2018-12-05 PROCEDURE — 85610 PROTHROMBIN TIME: CPT

## 2018-12-05 PROCEDURE — 99211 OFF/OP EST MAY X REQ PHY/QHP: CPT

## 2018-12-05 PROCEDURE — 36416 COLLJ CAPILLARY BLOOD SPEC: CPT

## 2018-12-05 NOTE — PROGRESS NOTES
Medication Management [facility]  Anticoagulation Clinic  [phone] (phone)  [phone] (fax)    Ms. Tylor Lancaster is a 48 y.o.  female with history of PE who presents today for anticoagulation monitoring and adjustment. Patient verifies current dosing regimen and tablet strength. No missed or extra doses. Patient denies s/s bleeding/bruising/swelling/SOB/chest pain. No blood in urine or stool. No dietary changes. No changes in medication/OTC agents/Herbals. No change in alcohol use or tobacco use. No change in activity level. Patient denies headaches/dizziness/lightheadedness/falls. No vomitin or acute illness. Had diarrhea Saturday, thinks it was from something she ate. No procedures scheduled in the future at this time. Assessment:   Lab Results   Component Value Date    INR 2.70 (H) 12/05/2018    INR 3.20 (H) 11/14/2018    INR 2.40 (H) 10/17/2018     INR therapeutic   Recent Labs      12/05/18   1548   INR  2.70*     Plan: POCT INR ordered and result reviewed. Continue Coumadin 10 mg po MWF, 15 mg po TTHSS. Recheck INR in 4 weeks. Patient reminded to call the Anticoagulation Clinic with any signs or symptoms of bleeding or with any medication changes. Patient given instructions utilizing the teach back method. H&H order given. Discharged ambulatory in no apparent distress. After visit summary printed and reviewed with patient.       Medications reviewed and updated on home medication list Yes    Influenza vaccine:     [] given    [] declined   [x] received previously   [] plans to receive at a later time   [] refused    [x] documented in EPIC

## 2019-01-02 ENCOUNTER — HOSPITAL ENCOUNTER (OUTPATIENT)
Dept: PHARMACY | Age: 54
Setting detail: THERAPIES SERIES
Discharge: HOME | End: 2019-01-02
Payer: COMMERCIAL

## 2019-01-02 ENCOUNTER — HOSPITAL ENCOUNTER (OUTPATIENT)
Age: 54
Discharge: HOME | End: 2019-01-02
Payer: COMMERCIAL

## 2019-01-02 DIAGNOSIS — I26.99 OTHER PULMONARY EMBOLISM WITHOUT ACUTE COR PULMONALE, UNSPECIFIED CHRONICITY (HCC): ICD-10-CM

## 2019-01-02 DIAGNOSIS — I26.99 OTHER ACUTE PULMONARY EMBOLISM WITHOUT ACUTE COR PULMONALE (HCC): ICD-10-CM

## 2019-01-02 LAB
HCT VFR BLD CALC: 37.8 % (ref 37–47)
HEMOGLOBIN: 12.2 GM/DL (ref 12–16)
POC INR: 2.5 (ref 0.8–1.2)

## 2019-01-02 PROCEDURE — 85014 HEMATOCRIT: CPT

## 2019-01-02 PROCEDURE — 85018 HEMOGLOBIN: CPT

## 2019-01-02 PROCEDURE — 99211 OFF/OP EST MAY X REQ PHY/QHP: CPT

## 2019-01-02 PROCEDURE — 85610 PROTHROMBIN TIME: CPT

## 2019-01-02 PROCEDURE — 36415 COLL VENOUS BLD VENIPUNCTURE: CPT

## 2019-01-02 PROCEDURE — 36416 COLLJ CAPILLARY BLOOD SPEC: CPT

## 2019-01-22 ENCOUNTER — APPOINTMENT (OUTPATIENT)
Dept: GENERAL RADIOLOGY | Age: 54
End: 2019-01-22
Payer: COMMERCIAL

## 2019-01-22 ENCOUNTER — TELEPHONE (OUTPATIENT)
Dept: PHARMACY | Age: 54
End: 2019-01-22

## 2019-01-22 ENCOUNTER — HOSPITAL ENCOUNTER (EMERGENCY)
Age: 54
Discharge: HOME | End: 2019-01-22
Attending: FAMILY MEDICINE
Payer: COMMERCIAL

## 2019-01-22 VITALS
HEIGHT: 64 IN | BODY MASS INDEX: 36.88 KG/M2 | WEIGHT: 216 LBS | RESPIRATION RATE: 18 BRPM | DIASTOLIC BLOOD PRESSURE: 84 MMHG | SYSTOLIC BLOOD PRESSURE: 135 MMHG | TEMPERATURE: 99.5 F | HEART RATE: 76 BPM | OXYGEN SATURATION: 99 %

## 2019-01-22 DIAGNOSIS — M25.562 ACUTE PAIN OF BOTH KNEES: ICD-10-CM

## 2019-01-22 DIAGNOSIS — R06.00 DYSPNEA, UNSPECIFIED TYPE: Primary | ICD-10-CM

## 2019-01-22 DIAGNOSIS — M25.561 ACUTE PAIN OF BOTH KNEES: ICD-10-CM

## 2019-01-22 LAB
ALBUMIN SERPL-MCNC: 4.3 G/DL (ref 3.5–5.1)
ALP BLD-CCNC: 89 U/L (ref 38–126)
ALT SERPL-CCNC: 20 U/L (ref 11–66)
ANION GAP SERPL CALCULATED.3IONS-SCNC: 13 MEQ/L (ref 8–16)
AST SERPL-CCNC: 19 U/L (ref 5–40)
BASOPHILS # BLD: 0.4 %
BASOPHILS ABSOLUTE: 0 THOU/MM3 (ref 0–0.1)
BILIRUB SERPL-MCNC: 0.3 MG/DL (ref 0.3–1.2)
BILIRUBIN DIRECT: < 0.2 MG/DL (ref 0–0.3)
BUN BLDV-MCNC: 18 MG/DL (ref 7–22)
CALCIUM SERPL-MCNC: 8.9 MG/DL (ref 8.5–10.5)
CHLORIDE BLD-SCNC: 105 MEQ/L (ref 98–111)
CO2: 24 MEQ/L (ref 23–33)
CREAT SERPL-MCNC: 0.9 MG/DL (ref 0.4–1.2)
EKG ATRIAL RATE: 71 BPM
EKG P AXIS: 67 DEGREES
EKG P-R INTERVAL: 150 MS
EKG Q-T INTERVAL: 380 MS
EKG QRS DURATION: 84 MS
EKG QTC CALCULATION (BAZETT): 412 MS
EKG R AXIS: 63 DEGREES
EKG T AXIS: 49 DEGREES
EKG VENTRICULAR RATE: 71 BPM
EOSINOPHIL # BLD: 1.1 %
EOSINOPHILS ABSOLUTE: 0.1 THOU/MM3 (ref 0–0.4)
ERYTHROCYTE [DISTWIDTH] IN BLOOD BY AUTOMATED COUNT: 15.5 % (ref 11.5–14.5)
ERYTHROCYTE [DISTWIDTH] IN BLOOD BY AUTOMATED COUNT: 51.1 FL (ref 35–45)
FLU A ANTIGEN: NEGATIVE
FLU B ANTIGEN: NEGATIVE
GFR SERPL CREATININE-BSD FRML MDRD: 79 ML/MIN/1.73M2
GLUCOSE BLD-MCNC: 92 MG/DL (ref 70–108)
HCT VFR BLD CALC: 39 % (ref 37–47)
HEMOGLOBIN: 12.6 GM/DL (ref 12–16)
IMMATURE GRANS (ABS): 0 THOU/MM3 (ref 0–0.07)
IMMATURE GRANULOCYTES: 0 %
INR BLD: 1.54 (ref 0.85–1.13)
LIPASE: 52.1 U/L (ref 5.6–51.3)
LYMPHOCYTES # BLD: 41.2 %
LYMPHOCYTES ABSOLUTE: 2.2 THOU/MM3 (ref 1–4.8)
MCH RBC QN AUTO: 29.1 PG (ref 26–33)
MCHC RBC AUTO-ENTMCNC: 32.3 GM/DL (ref 32.2–35.5)
MCV RBC AUTO: 90.1 FL (ref 81–99)
MONOCYTES # BLD: 6 %
MONOCYTES ABSOLUTE: 0.3 THOU/MM3 (ref 0.4–1.3)
NUCLEATED RED BLOOD CELLS: 0 /100 WBC
OSMOLALITY CALCULATION: 284.7 MOSMOL/KG (ref 275–300)
PLATELET # BLD: 285 THOU/MM3 (ref 130–400)
PMV BLD AUTO: 10.8 FL (ref 9.4–12.4)
POTASSIUM SERPL-SCNC: 3.8 MEQ/L (ref 3.5–5.2)
PRO-BNP: 26.7 PG/ML (ref 0–900)
RBC # BLD: 4.33 MILL/MM3 (ref 4.2–5.4)
SEG NEUTROPHILS: 51.3 %
SEGMENTED NEUTROPHILS ABSOLUTE COUNT: 2.7 THOU/MM3 (ref 1.8–7.7)
SODIUM BLD-SCNC: 142 MEQ/L (ref 135–145)
TOTAL PROTEIN: 7 G/DL (ref 6.1–8)
TROPONIN T: < 0.01 NG/ML
WBC # BLD: 5.3 THOU/MM3 (ref 4.8–10.8)

## 2019-01-22 PROCEDURE — 87804 INFLUENZA ASSAY W/OPTIC: CPT

## 2019-01-22 PROCEDURE — 80076 HEPATIC FUNCTION PANEL: CPT

## 2019-01-22 PROCEDURE — 99285 EMERGENCY DEPT VISIT HI MDM: CPT

## 2019-01-22 PROCEDURE — 73562 X-RAY EXAM OF KNEE 3: CPT

## 2019-01-22 PROCEDURE — 83880 ASSAY OF NATRIURETIC PEPTIDE: CPT

## 2019-01-22 PROCEDURE — 80048 BASIC METABOLIC PNL TOTAL CA: CPT

## 2019-01-22 PROCEDURE — 84484 ASSAY OF TROPONIN QUANT: CPT

## 2019-01-22 PROCEDURE — 85025 COMPLETE CBC W/AUTO DIFF WBC: CPT

## 2019-01-22 PROCEDURE — 71046 X-RAY EXAM CHEST 2 VIEWS: CPT

## 2019-01-22 PROCEDURE — 85610 PROTHROMBIN TIME: CPT

## 2019-01-22 PROCEDURE — 36415 COLL VENOUS BLD VENIPUNCTURE: CPT

## 2019-01-22 PROCEDURE — 93005 ELECTROCARDIOGRAM TRACING: CPT | Performed by: FAMILY MEDICINE

## 2019-01-22 PROCEDURE — 83690 ASSAY OF LIPASE: CPT

## 2019-01-22 ASSESSMENT — ENCOUNTER SYMPTOMS
ABDOMINAL PAIN: 0
SHORTNESS OF BREATH: 1
COUGH: 0
WHEEZING: 0
BLOOD IN STOOL: 0

## 2019-01-23 PROCEDURE — 93010 ELECTROCARDIOGRAM REPORT: CPT | Performed by: INTERNAL MEDICINE

## 2019-01-30 ENCOUNTER — HOSPITAL ENCOUNTER (OUTPATIENT)
Dept: PHARMACY | Age: 54
Setting detail: THERAPIES SERIES
Discharge: HOME | End: 2019-01-30
Payer: COMMERCIAL

## 2019-01-30 DIAGNOSIS — I26.99 OTHER ACUTE PULMONARY EMBOLISM WITHOUT ACUTE COR PULMONALE (HCC): ICD-10-CM

## 2019-01-30 LAB — POC INR: 1.9 (ref 0.8–1.2)

## 2019-01-30 PROCEDURE — 85610 PROTHROMBIN TIME: CPT | Performed by: PHARMACIST

## 2019-01-30 PROCEDURE — 99211 OFF/OP EST MAY X REQ PHY/QHP: CPT | Performed by: PHARMACIST

## 2019-01-30 PROCEDURE — 36416 COLLJ CAPILLARY BLOOD SPEC: CPT | Performed by: PHARMACIST

## 2019-02-27 ENCOUNTER — HOSPITAL ENCOUNTER (OUTPATIENT)
Dept: PHARMACY | Age: 54
Setting detail: THERAPIES SERIES
Discharge: HOME | End: 2019-02-27
Payer: COMMERCIAL

## 2019-02-27 DIAGNOSIS — I26.99 OTHER ACUTE PULMONARY EMBOLISM WITHOUT ACUTE COR PULMONALE (HCC): ICD-10-CM

## 2019-02-27 LAB — POC INR: 2.4 (ref 0.8–1.2)

## 2019-02-27 PROCEDURE — 36416 COLLJ CAPILLARY BLOOD SPEC: CPT

## 2019-02-27 PROCEDURE — 85610 PROTHROMBIN TIME: CPT

## 2019-02-27 PROCEDURE — 99211 OFF/OP EST MAY X REQ PHY/QHP: CPT

## 2019-03-27 ENCOUNTER — HOSPITAL ENCOUNTER (OUTPATIENT)
Dept: PHARMACY | Age: 54
Setting detail: THERAPIES SERIES
Discharge: HOME | End: 2019-03-27
Payer: COMMERCIAL

## 2019-03-27 DIAGNOSIS — I26.99 OTHER ACUTE PULMONARY EMBOLISM WITHOUT ACUTE COR PULMONALE (HCC): ICD-10-CM

## 2019-03-27 LAB — POC INR: 1.8 (ref 0.8–1.2)

## 2019-03-27 PROCEDURE — 85610 PROTHROMBIN TIME: CPT

## 2019-03-27 PROCEDURE — 36416 COLLJ CAPILLARY BLOOD SPEC: CPT

## 2019-03-27 PROCEDURE — 99211 OFF/OP EST MAY X REQ PHY/QHP: CPT

## 2019-04-12 ENCOUNTER — HOSPITAL ENCOUNTER (OUTPATIENT)
Age: 54
Setting detail: SPECIMEN
Discharge: HOME | End: 2019-04-12
Payer: OTHER MISCELLANEOUS

## 2019-04-13 LAB
DIRECT EXAM: NORMAL
Lab: NORMAL
SPECIMEN DESCRIPTION: NORMAL

## 2019-04-17 ENCOUNTER — HOSPITAL ENCOUNTER (OUTPATIENT)
Dept: PHARMACY | Age: 54
Setting detail: THERAPIES SERIES
Discharge: HOME | End: 2019-04-17
Payer: COMMERCIAL

## 2019-04-17 DIAGNOSIS — I26.99 OTHER ACUTE PULMONARY EMBOLISM WITHOUT ACUTE COR PULMONALE (HCC): ICD-10-CM

## 2019-04-17 LAB — POC INR: 3.3 (ref 0.8–1.2)

## 2019-04-17 PROCEDURE — 85610 PROTHROMBIN TIME: CPT | Performed by: PHARMACIST

## 2019-04-17 PROCEDURE — 36416 COLLJ CAPILLARY BLOOD SPEC: CPT | Performed by: PHARMACIST

## 2019-04-17 PROCEDURE — 99211 OFF/OP EST MAY X REQ PHY/QHP: CPT | Performed by: PHARMACIST

## 2019-04-17 RX ORDER — METRONIDAZOLE 500 MG/1
500 TABLET ORAL 2 TIMES DAILY
COMMUNITY
Start: 2019-04-14 | End: 2019-04-20

## 2019-04-17 NOTE — PROGRESS NOTES
Medication Management [facility]  Anticoagulation Clinic  [phone] (phone)  [phone] (fax)      Ms. Lorri Hawley is a 47 y.o.  female with history of PE who presents today for anticoagulation monitoring and adjustment. Patient verifies current dosing regimen and tablet strength. No missed or extra doses. Patient denies s/s bleeding/bruising/swelling/SOB/chest pain  No blood in urine or stool. No dietary changes. No changes in medication/OTC agents/Herbals. Patient started on a course of Flagyl 500 mg twice daily for 7 days on April 14th. No change in alcohol use or tobacco use. No change in activity level. Patient denies headaches/dizziness/lightheadedness/falls. No vomiting/diarrhea or acute illness. No Procedures scheduled in the future at this time. Assessment:   Lab Results   Component Value Date    INR 3.30 (H) 04/17/2019    INR 1.80 (H) 03/27/2019    INR 2.40 (H) 02/27/2019     INR supratherapeutic   Recent Labs     04/17/19  1555   INR 3.30*       Plan:    5mg x1, 10mg x1, 5mg x1 then continue Coumadin 10mg MWf, 15mg TThSS. Recheck INR in 2 weeks. Patient reminded to call the Anticoagulation Clinic with any signs or symptoms of bleeding or with any medication changes. Patient given instructions utilizing the teach back method. Discharged ambulatory in no apparent distress. After visit summary printed and reviewed with patient.       Medications reviewed and updated on home medication list Yes    Influenza vaccine:     [] given    [x] declined   [x] received previously   [] plans to receive at a later time   [] refused    [x] documented in EPIC

## 2019-05-01 ENCOUNTER — HOSPITAL ENCOUNTER (OUTPATIENT)
Dept: PHARMACY | Age: 54
Setting detail: THERAPIES SERIES
Discharge: HOME | End: 2019-05-01
Payer: COMMERCIAL

## 2019-05-01 DIAGNOSIS — I26.99 OTHER ACUTE PULMONARY EMBOLISM WITHOUT ACUTE COR PULMONALE (HCC): ICD-10-CM

## 2019-05-01 LAB — POC INR: 2.3 (ref 0.8–1.2)

## 2019-05-01 PROCEDURE — 36416 COLLJ CAPILLARY BLOOD SPEC: CPT

## 2019-05-01 PROCEDURE — 85610 PROTHROMBIN TIME: CPT

## 2019-05-01 PROCEDURE — 99211 OFF/OP EST MAY X REQ PHY/QHP: CPT

## 2019-05-29 ENCOUNTER — HOSPITAL ENCOUNTER (OUTPATIENT)
Dept: PHARMACY | Age: 54
Setting detail: THERAPIES SERIES
Discharge: HOME | End: 2019-05-29
Payer: COMMERCIAL

## 2019-05-29 DIAGNOSIS — I26.99 OTHER ACUTE PULMONARY EMBOLISM WITHOUT ACUTE COR PULMONALE (HCC): ICD-10-CM

## 2019-05-29 LAB — POC INR: 2.1 (ref 0.8–1.2)

## 2019-05-29 PROCEDURE — 85610 PROTHROMBIN TIME: CPT | Performed by: PHARMACIST

## 2019-05-29 PROCEDURE — 99211 OFF/OP EST MAY X REQ PHY/QHP: CPT | Performed by: PHARMACIST

## 2019-05-29 PROCEDURE — 36416 COLLJ CAPILLARY BLOOD SPEC: CPT | Performed by: PHARMACIST

## 2019-05-29 NOTE — PROGRESS NOTES
Medication Management [facility]  Anticoagulation Clinic  [phone] (phone)  [phone] (fax)      Ms. Tena Daniels is a 47 y.o.  female with history of PE who presents today for anticoagulation monitoring and adjustment. Patient verifies current dosing regimen and tablet strength. No missed or extra doses. Patient denies s/s bleeding/bruising/swelling/SOB/chest pain  No blood in urine or stool. No dietary changes. No changes in medication/OTC agents/Herbals. No change in alcohol use or tobacco use. No change in activity level. Patient denies headaches/dizziness/lightheadedness/falls. No vomiting/diarrhea or acute illness. No Procedures scheduled in the future at this time. Patient seeing podiatrist for possible foot surgery. Assessment:   Lab Results   Component Value Date    INR 2.10 (H) 05/29/2019    INR 2.30 (H) 05/01/2019    INR 3.30 (H) 04/17/2019     INR therapeutic   Recent Labs     05/29/19  1617   INR 2.10*         Plan:  Continue Coumadin 10mg MWF, 15mg TThSS. Recheck INR in 4 weeks. Patient reminded to call the Anticoagulation Clinic with any signs or symptoms of bleeding or with any medication changes. Patient given instructions utilizing the teach back method. Discharged ambulatory in no apparent distress. After visit summary printed and reviewed with patient.       Medications reviewed and updated on home medication list Yes    Influenza vaccine:     [] given    [x] declined   [x] received previously   [] plans to receive at a later time   [] refused    [x] documented in EPIC

## 2019-06-11 ENCOUNTER — TELEPHONE (OUTPATIENT)
Dept: CARDIOLOGY CLINIC | Age: 54
End: 2019-06-11

## 2019-06-11 NOTE — TELEPHONE ENCOUNTER
Paola Rodriguez from Dr. Gm Burdick office is calling to schedule a pre-op clearance for pt. DOS is 06/25- no available appt within requested timeframe. Please advise to Paola Rodriguez at Dr. [facility] office.

## 2019-06-13 ENCOUNTER — TELEPHONE (OUTPATIENT)
Dept: PHARMACY | Age: 54
End: 2019-06-13

## 2019-06-13 ENCOUNTER — OFFICE VISIT (OUTPATIENT)
Dept: CARDIOLOGY CLINIC | Age: 54
End: 2019-06-13
Payer: COMMERCIAL

## 2019-06-13 VITALS
BODY MASS INDEX: 37.56 KG/M2 | DIASTOLIC BLOOD PRESSURE: 64 MMHG | SYSTOLIC BLOOD PRESSURE: 120 MMHG | WEIGHT: 220 LBS | HEIGHT: 64 IN | HEART RATE: 74 BPM

## 2019-06-13 DIAGNOSIS — R00.1 SINUS BRADYCARDIA: ICD-10-CM

## 2019-06-13 DIAGNOSIS — R94.31 ABNORMAL EKG: ICD-10-CM

## 2019-06-13 DIAGNOSIS — R93.1 ABNORMAL ECHOCARDIOGRAM: ICD-10-CM

## 2019-06-13 DIAGNOSIS — G47.33 OSA (OBSTRUCTIVE SLEEP APNEA): ICD-10-CM

## 2019-06-13 DIAGNOSIS — Z01.818 PRE-OP EVALUATION: Primary | ICD-10-CM

## 2019-06-13 PROCEDURE — 99214 OFFICE O/P EST MOD 30 MIN: CPT | Performed by: INTERNAL MEDICINE

## 2019-06-13 PROCEDURE — 93000 ELECTROCARDIOGRAM COMPLETE: CPT | Performed by: INTERNAL MEDICINE

## 2019-06-13 NOTE — TELEPHONE ENCOUNTER
Patient states she is having toe surgery 6/25/19 at [facility] with Dr. Tati Davis. She states he tells her to hold Coumadin 5 days and bridge with Lovenox. Patient has used Lovenox in the past.  Patient rescheduled to be in clinic next week.

## 2019-06-13 NOTE — PROGRESS NOTES
Chief Complaint   Patient presents with    Pre-op Exam    Bradycardia   . Originally Was bath room felt CP and later she found herself on the floor and brought to the hospital and found to have PE and troponin elevation  Was seen by Dr. Araceli Arenas in the hospital      Pre-op for toe surgery with Dr. Samanta Liz on 6-25-19. Pre-op for D&C with DR Alex Grewal not yet scheduled. EKG done today. cpap for TERESA used once in a while    Denies chest pain, sob, dizziness, edema, and palpitations    Had Hx of syncope sept 2013 while getting off toilate and Dx with PE    Dec 2009 had PE and sept 2013- PE      Patient Active Problem List   Diagnosis    Hx of Recurrent  pulmonary embolism dec 2009 and 09/2013- need to cont life long coumadin    hx of Syncope and collapse due to, PE    Obesity    Abnormal echocardiogram EF 55%, RVE and RV function reduced post PE    SOB (shortness of breath) on exertion- improving    Pulmonary embolus (HCC)    Deep vein thrombosis (HCC)    Annalee filter in place    Anticoagulated on Coumadin    Sinus bradycardia -  and pauses only in the night range from 2.1 to 3.7 sec pause-pat Dxed with TERESA 2013 and cpap- Holter monitor after CPAP got better    TERESA (obstructive sleep apnea)    Plantar fasciitis, right    Asthma    Type 2 diabetes mellitus ([facility] [address]    Pre-op evaluation for Toe surgery and D & C    Abnormal EKG       Past Surgical History:   Procedure Laterality Date    ANKLE SURGERY      Left Ankle.  plate    OTHER SURGICAL HISTORY      right wrist for tendonitis    VENA CAVA FILTER PLACEMENT         No Known Allergies     Family History   Problem Relation Age of Onset    Cancer Mother         breast    High Blood Pressure Sister     High Cholesterol Sister     Diabetes Brother     Stroke Maternal Grandmother         Social History     Socioeconomic History    Marital status: Legally      Spouse name: Not on file    Number of children: Not on file    Years of as needed Indications: Asthma      Calcium Carbonate (CALTRATE 600 PO) Take 2 tablets by mouth daily supplement      Acetaminophen (TYLENOL PO) Take 1,000 mg by mouth as needed Don't take more than 3,000 mg per day. No current facility-administered medications for this visit. Review of Systems -     General ROS: negative  Psychological ROS: negative  Hematological and Lymphatic ROS: No history of blood clots or bleeding disorder. Respiratory ROS: no cough, shortness of breath, or wheezing  Cardiovascular ROS: no chest pain or dyspnea on exertion  Gastrointestinal ROS: negative  Genito-Urinary ROS: no dysuria, trouble voiding, or hematuria  Musculoskeletal ROS: negative  Neurological ROS: no TIA or stroke symptoms  Dermatological ROS: negative      Blood pressure 120/64, pulse 74, height 5' 4\" (1.626 m), weight 220 lb (99.8 kg). Physical Examination:    General appearance - alert, well appearing, and in no distress  Mental status - alert, oriented to person, place, and time  Neck - supple, no significant adenopathy, no JVD, or carotid bruits  Chest - clear to auscultation, no wheezes, rales or rhonchi, symmetric air entry  Heart - normal rate, regular rhythm, normal S1, S2, no murmurs, rubs, clicks or gallops  Abdomen - soft, nontender, nondistended, no masses or organomegaly  Neurological - alert, oriented, normal speech, no focal findings or movement disorder noted  Musculoskeletal - no joint tenderness, deformity or swelling  Extremities - peripheral pulses normal, no pedal edema, no clubbing or cyanosis  Skin - normal coloration and turgor, no rashes, no suspicious skin lesions noted    Lab  No results for input(s): CKTOTAL, CKMB, CKMBINDEX, TROPONINI in the last 72 hours.   CBC:   Lab Results   Component Value Date    WBC 5.3 01/22/2019    RBC 4.33 01/22/2019    HGB 12.6 01/22/2019    HCT 39.0 01/22/2019    MCV 90.1 01/22/2019    MCH 29.1 01/22/2019    MCHC 32.3 01/22/2019    RDW 15.0 09/18/2013     01/22/2019    MPV 10.8 01/22/2019     BMP:    Lab Results   Component Value Date     01/22/2019    K 3.8 01/22/2019     01/22/2019    CO2 24 01/22/2019    BUN 18 01/22/2019    LABALBU 4.3 01/22/2019    CREATININE 0.9 01/22/2019    CALCIUM 8.9 01/22/2019    LABGLOM 79 01/22/2019    GLUCOSE 92 01/22/2019    GLUCOSE 96 09/12/2017     Hepatic Function Panel:    Lab Results   Component Value Date    ALKPHOS 89 01/22/2019    ALT 20 01/22/2019    AST 19 01/22/2019    PROT 7.0 01/22/2019    PROT 7.6 12/29/2016    BILITOT 0.3 01/22/2019    BILIDIR <0.2 01/22/2019    IBILI 0.6 12/29/2016    LABALBU 4.3 01/22/2019     Magnesium:    No results found for: MG  Warfarin PT/INR:    No components found for: PTPATWAR,  PTINRWAR  HgBA1c:    Lab Results   Component Value Date    LABA1C 6.1 09/10/2013     FLP:    Lab Results   Component Value Date    TRIG 46 09/12/2017    HDL 53 09/12/2017    LDLCALC 174 12/29/2016    LDLDIRECT 81 09/12/2017     TSH:    Lab Results   Component Value Date    TSH 0.785 05/30/2013     Echo  SUMMARY:    Left ventricle: There is moderate hypokinesis of the entire septum. Size was normal.  Systolic function was at the lower limits of normal by Teichholz. Ejection  fraction was estimated in the range of 50 % to 55 %. This study was inadequate for the evaluation of regional wall motion. Wall thickness was at the upper limits of normal.  Doppler parameters were consistent with abnormal left ventricular relaxation  (grade 1 diastolic dysfunction). Right ventricle: The ventricle was mildly to moderately dilated. Systolic function was moderately to markedly reduced. Right atrium:  The atrium was mildly dilated. Tricuspid valve: There was mild regurgitation. Inferior vena cava, hepatic veins: The respirophasic change in diameter was less than 50%.     Prepared and signed by    Sofiya Dotson MD    EKG   Sinus bradycardia  Otherwise normal ECG  When compared with ECG of 10-SEP-2013 07:00,  Ventricular rate has decreased BY 37 BPM  ST no longer elevated in Anterior leads  T wave inversion less evident in Anterior leads  QT has shortened  Confirmed by Raheem Jacobsen MD, AIMEE ([id number])    Nuc stress Okay    EKG 8/14/14-Sinus  Rhythm   -RSR(V1) -nondiagnostic. PROBABLY NORMAL    2/12/14-ekg-Sinus  Bradycardia   -RSR(V1) -nondiagnostic. PROBABLY NORMAL      CONCLUSION:  1. This is a normal sinus rhythm with average heart rate of 76 beats  per minute ranging from  beats per minute. 2. No significant pause of more than 1.4 seconds noted. 3. Rare ventricular ectopic beats, all isolated. Rare  supraventricular ectopic beats, isolated and couplets. 4. No other form of arrhythmia noted. No significant bradyarrhythmia  noted as well.          Aimee Navarrete M.D.  Jesse Arnold  D: 12/31/2015 17:44     EKG 10/13/16  Sinus  Rhythm   -Incomplete right bundle branch block. ABNORMAL     10/16/18  Sinus  Rhythm  -With rate variation   cv = 11.  -Incomplete right bundle branch block. ABNORMAL    EKG 6/13/19   Sinus  Rhythm   -Incomplete right bundle branch block. ABNORMAL             Assessment     Diagnosis Orders   1. Pre-op evaluation for Toe surgery and D & C  ECHO Complete 2D W Doppler W Color    ECHO Stress Test   2. Abnormal EKG  ECHO Complete 2D W Doppler W Color    ECHO Stress Test   3. Sinus bradycardia -  and pauses only in the night range from 2.1 to 3.7 sec pause-pat Dxed with TERESA 2013 and cpap- Holter monitor after CPAP got better  EKG 12 Lead    ECHO Complete 2D W Doppler W Color    ECHO Stress Test   4. TERESA (obstructive sleep apnea)  ECHO Complete 2D W Doppler W Color    ECHO Stress Test   5. Abnormal echocardiogram EF 55%, RVE and RV function reduced post PE  ECHO Complete 2D W Doppler W Color    ECHO Stress Test       Past admission Dx  IMPRESSION:   1. Bilateral pulmonary emboli - recurrent.    2. Incomplete right bundle branch block and EKG abnormalities secondary to her pulmonary emboli. 3. Mildly elevated troponin secondary to her pulmonary emboli    NO FHX of premature CAD    Father  at 45 yrs in his sleep-stated ETOH related and liver problem from ETOH    Plan   The most Current meds and labs reviewed    Pre op eval for toe surgery and D and C  Abn ekg  Echo  Stress echo  If test okay may proceed     Abn holter with 2.1 tioo 3.7 sec pause- resolved and HR got better after stopping lopressor and using CPAP-its seems to be TERESA related  Violet Coe already Dxed with TERESA in   Was on CPAP and then the holter 48  Post capa was good with no pause  Pause resolved after CPAP  D/w the pat at length    Bradycardia  Sinus with no symptom and abn holter  Off   atenolol 12.5 po qd and oncpap  Got better  RADHA definite HX of HTN    HX of DVT and PE - need life Long coumadin    hypercoagulable study- negative    Continue the current treatment and with constant vigilance to changes in symptoms and also any potential side effects. Return for care or seek medical attention immediately if symptoms got worse and/or develop new symptoms.   Asa 81mg po qd    Repeat echo in for RV function-  And pre op- next visit- RV size regressessed  D/w the pat the plan of care and the risk involving surgey  Off pravastatin as lipids are okay and trop related to PE    D/w the pat plan of care  Encouraged to cont Cpap    RTC in   1 year    Rebeka Yanez

## 2019-06-13 NOTE — TELEPHONE ENCOUNTER
Aimee called to let us know that she is having surgery on   June 25th .    Dr. Jeffy De Paz is the surgeon

## 2019-06-18 LAB
BASOPHILS ABSOLUTE: NORMAL /ΜL
BASOPHILS RELATIVE PERCENT: NORMAL %
BUN BLDV-MCNC: 13 MG/DL
CALCIUM SERPL-MCNC: 9.1 MG/DL
CHLORIDE BLD-SCNC: 105 MMOL/L
CO2: 26 MMOL/L
CREAT SERPL-MCNC: 0.77 MG/DL
EOSINOPHILS ABSOLUTE: NORMAL /ΜL
EOSINOPHILS RELATIVE PERCENT: NORMAL %
GFR CALCULATED: >60
GLUCOSE BLD-MCNC: 83 MG/DL
HCT VFR BLD CALC: 38.3 % (ref 36–46)
HEMOGLOBIN: 12.7 G/DL (ref 12–16)
LYMPHOCYTES ABSOLUTE: NORMAL /ΜL
LYMPHOCYTES RELATIVE PERCENT: NORMAL %
MCH RBC QN AUTO: NORMAL PG
MCHC RBC AUTO-ENTMCNC: NORMAL G/DL
MCV RBC AUTO: NORMAL FL
MONOCYTES ABSOLUTE: NORMAL /ΜL
MONOCYTES RELATIVE PERCENT: NORMAL %
NEUTROPHILS ABSOLUTE: NORMAL /ΜL
NEUTROPHILS RELATIVE PERCENT: NORMAL %
PLATELET # BLD: 282 K/ΜL
PMV BLD AUTO: NORMAL FL
POTASSIUM SERPL-SCNC: 3.9 MMOL/L
RBC # BLD: 4.34 10^6/ΜL
SODIUM BLD-SCNC: 139 MMOL/L
WBC # BLD: 5 10^3/ML

## 2019-06-19 ENCOUNTER — HOSPITAL ENCOUNTER (OUTPATIENT)
Age: 54
Discharge: HOME | End: 2019-06-19
Payer: COMMERCIAL

## 2019-06-19 ENCOUNTER — HOSPITAL ENCOUNTER (OUTPATIENT)
Dept: GENERAL RADIOLOGY | Age: 54
Discharge: HOME | End: 2019-06-19
Payer: COMMERCIAL

## 2019-06-19 ENCOUNTER — HOSPITAL ENCOUNTER (OUTPATIENT)
Dept: PHARMACY | Age: 54
Setting detail: THERAPIES SERIES
Discharge: HOME | End: 2019-06-19
Payer: COMMERCIAL

## 2019-06-19 DIAGNOSIS — Z01.818 PREOP EXAMINATION: ICD-10-CM

## 2019-06-19 DIAGNOSIS — I26.99 OTHER ACUTE PULMONARY EMBOLISM WITHOUT ACUTE COR PULMONALE (HCC): ICD-10-CM

## 2019-06-19 LAB — POC INR: 2.1 (ref 0.8–1.2)

## 2019-06-19 PROCEDURE — 85610 PROTHROMBIN TIME: CPT

## 2019-06-19 PROCEDURE — 36416 COLLJ CAPILLARY BLOOD SPEC: CPT

## 2019-06-19 PROCEDURE — 71046 X-RAY EXAM CHEST 2 VIEWS: CPT

## 2019-06-19 PROCEDURE — 99211 OFF/OP EST MAY X REQ PHY/QHP: CPT

## 2019-06-19 NOTE — PROGRESS NOTES
6/20    none    none    none         6/21    150 mg    none    none         6/22       150 mg    none    none      6/23       150 mg    none    none      6/24       75 mg    none    none      6/25  surgery       none    none    none      6/26       none    150 mg    20 mg      6/27       none    150 mg    20 mg      6/28       none    150 mg    20 mg      6/29       none    150 mg    20 mg      6/30       none    150 mg    15 mg     7/1/19     none   150 mg   10 mg   INR to be checked:  7/2/19  Mora Quezada, [facility]/6/13/19     Clinical Pharmacist/Date     Any questions please call [facility] Anticoagulation Clinic at [phone]

## 2019-06-20 ENCOUNTER — TELEPHONE (OUTPATIENT)
Dept: PHARMACY | Age: 54
End: 2019-06-20

## 2019-06-20 NOTE — TELEPHONE ENCOUNTER
Patient called and states will be starting on an antibiotic and is unsure of the name. Called [employer] who confirmed the patient will be starting on macrobid x 10 days. Told patient this does not interact and to continue taking coumadin as instructed. Patient notified understanding.

## 2019-06-20 NOTE — TELEPHONE ENCOUNTER
Aimee is having foot surgery. She said she had 2 RX called in to her pharmacy. 1 from  Dr. [employer] and us. She doesn't know what one to . She took her last dose of coumadin yesterday.   I told her we'd call her back soon

## 2019-06-21 ENCOUNTER — HOSPITAL ENCOUNTER (OUTPATIENT)
Dept: NON INVASIVE DIAGNOSTICS | Age: 54
Discharge: HOME | End: 2019-06-21
Payer: COMMERCIAL

## 2019-06-21 DIAGNOSIS — Z01.818 PRE-OP EVALUATION: ICD-10-CM

## 2019-06-21 DIAGNOSIS — G47.33 OSA (OBSTRUCTIVE SLEEP APNEA): ICD-10-CM

## 2019-06-21 DIAGNOSIS — R94.31 ABNORMAL EKG: ICD-10-CM

## 2019-06-21 DIAGNOSIS — R93.1 ABNORMAL ECHOCARDIOGRAM: ICD-10-CM

## 2019-06-21 DIAGNOSIS — R00.1 SINUS BRADYCARDIA: ICD-10-CM

## 2019-06-21 LAB
LV EF: 50 %
LV EF: 63 %
LVEF MODALITY: NORMAL
LVEF MODALITY: NORMAL

## 2019-06-21 PROCEDURE — 93017 CV STRESS TEST TRACING ONLY: CPT | Performed by: INTERNAL MEDICINE

## 2019-06-21 PROCEDURE — 93350 STRESS TTE ONLY: CPT

## 2019-06-21 PROCEDURE — 93306 TTE W/DOPPLER COMPLETE: CPT

## 2019-06-24 ENCOUNTER — TELEPHONE (OUTPATIENT)
Dept: CARDIOLOGY CLINIC | Age: 54
End: 2019-06-24

## 2019-06-24 NOTE — TELEPHONE ENCOUNTER
Dr. Chico Grossman office is calling asking if patient is clear for tomorrow's surgery? From Dr. Tacho Arnold note on 6-13-19:    Pre op eval for toe surgery and D and C  Abn ekg  Echo  Stress echo  If test okay may proceed       Summary   Normal left ventricle size and systolic function. Ejection fraction was   estimated at 60 to 65 %. There were no regional left ventricular wall   motion abnormalities and wall thickness was within normal limits. Summary   No chest pain   No stress Induced Arrhythmia   Good exercise capacity with METS of 10   Exercise time was 9 minutes   Exercise EKG is negative for ischemia   no stress-induced segmental wall motion abnormality.   Proper augmentation of the left ventricular with exercise   no stress induced cavity dilation.   Exercise stress echo is not suggestive for cardiac ischemia   Appropriate hemodynamic response to exercise.

## 2019-06-26 ENCOUNTER — APPOINTMENT (OUTPATIENT)
Dept: PHARMACY | Age: 54
End: 2019-06-26
Payer: COMMERCIAL

## 2019-07-02 ENCOUNTER — HOSPITAL ENCOUNTER (OUTPATIENT)
Dept: PHARMACY | Age: 54
Setting detail: THERAPIES SERIES
Discharge: HOME | End: 2019-07-02
Payer: COMMERCIAL

## 2019-07-02 DIAGNOSIS — I26.99 OTHER ACUTE PULMONARY EMBOLISM WITHOUT ACUTE COR PULMONALE (HCC): ICD-10-CM

## 2019-07-02 LAB — POC INR: 2.3 (ref 0.8–1.2)

## 2019-07-02 PROCEDURE — 99211 OFF/OP EST MAY X REQ PHY/QHP: CPT

## 2019-07-02 PROCEDURE — 36416 COLLJ CAPILLARY BLOOD SPEC: CPT

## 2019-07-02 PROCEDURE — 85610 PROTHROMBIN TIME: CPT

## 2019-07-02 RX ORDER — TRAMADOL HYDROCHLORIDE 50 MG/1
TABLET ORAL SEE ADMIN INSTRUCTIONS
COMMUNITY
Start: 2019-06-25 | End: 2019-07-29

## 2019-07-02 RX ORDER — OXYCODONE HYDROCHLORIDE AND ACETAMINOPHEN 5; 325 MG/1; MG/1
TABLET ORAL SEE ADMIN INSTRUCTIONS
COMMUNITY
Start: 2019-06-25 | End: 2019-07-29

## 2019-07-11 DIAGNOSIS — I82.409 DEEP VEIN THROMBOSIS (DVT) OF LOWER EXTREMITY, UNSPECIFIED CHRONICITY, UNSPECIFIED LATERALITY, UNSPECIFIED VEIN (HCC): ICD-10-CM

## 2019-07-11 DIAGNOSIS — Z79.01 ANTICOAGULATED ON COUMADIN: Primary | ICD-10-CM

## 2019-07-11 DIAGNOSIS — I26.99 OTHER ACUTE PULMONARY EMBOLISM WITHOUT ACUTE COR PULMONALE (HCC): ICD-10-CM

## 2019-07-13 PROBLEM — Z01.818 PRE-OP EVALUATION: Status: RESOLVED | Noted: 2019-06-13 | Resolved: 2019-07-13

## 2019-07-15 ENCOUNTER — TELEPHONE (OUTPATIENT)
Dept: PHARMACY | Age: 54
End: 2019-07-15

## 2019-07-15 ENCOUNTER — TELEPHONE (OUTPATIENT)
Dept: CARDIOLOGY CLINIC | Age: 54
End: 2019-07-15

## 2019-07-15 ENCOUNTER — HOSPITAL ENCOUNTER (OUTPATIENT)
Dept: PHARMACY | Age: 54
Setting detail: THERAPIES SERIES
Discharge: HOME | End: 2019-07-15
Payer: COMMERCIAL

## 2019-07-15 DIAGNOSIS — I26.99 OTHER ACUTE PULMONARY EMBOLISM WITHOUT ACUTE COR PULMONALE (HCC): ICD-10-CM

## 2019-07-15 LAB — POC INR: 3.6 (ref 0.8–1.2)

## 2019-07-15 PROCEDURE — 85610 PROTHROMBIN TIME: CPT

## 2019-07-15 PROCEDURE — 99211 OFF/OP EST MAY X REQ PHY/QHP: CPT

## 2019-07-15 PROCEDURE — 36416 COLLJ CAPILLARY BLOOD SPEC: CPT

## 2019-07-15 NOTE — PROGRESS NOTES
given    [] declined   [] received previously   [] plans to receive at a later time   [] refused    [] documented in [facility]

## 2019-07-29 ENCOUNTER — HOSPITAL ENCOUNTER (OUTPATIENT)
Dept: PHARMACY | Age: 54
Setting detail: THERAPIES SERIES
Discharge: HOME | End: 2019-07-29
Payer: COMMERCIAL

## 2019-07-29 DIAGNOSIS — I26.99 OTHER ACUTE PULMONARY EMBOLISM WITHOUT ACUTE COR PULMONALE (HCC): ICD-10-CM

## 2019-07-29 LAB — POC INR: 3.2 (ref 0.8–1.2)

## 2019-07-29 PROCEDURE — 36416 COLLJ CAPILLARY BLOOD SPEC: CPT

## 2019-07-29 PROCEDURE — 99212 OFFICE O/P EST SF 10 MIN: CPT

## 2019-07-29 PROCEDURE — 85610 PROTHROMBIN TIME: CPT

## 2019-07-29 NOTE — PROGRESS NOTES
Medication Management [facility]  Anticoagulation Clinic  [phone] (phone)  [phone] (fax)      Ms. Lucas Londono is a 47 y.o.  female with history of PE who presents today for anticoagulation monitoring and adjustment. Patient verifies current dosing regimen and tablet strength. No missed or extra doses. Patient denies s/s bleeding/swelling/SOB/chest pain. Patient has a bruise on her foot that she noticed a few days ago. No blood in urine or stool. No dietary changes. No changes in medication/OTC agents/Herbals. No change in alcohol use or tobacco use. No change in activity level. Patient denies headaches/dizziness/lightheadedness/falls. No vomiting or acute illness. Patient had diarrhea yesterday and this morning. Thinks it might be from sea food she ate yesterday. Patient has D&C procedure on August 15th. She will have to hold Coumadin for 5 days and was previously bridged for her foot procedure in June. Assessment:   Lab Results   Component Value Date    INR 3.20 (H) 07/29/2019    INR 3.60 (H) 07/15/2019    INR 2.30 (H) 07/02/2019     INR supratherapeutic   Recent Labs     07/29/19  1523   INR 3.20*     Plan:  Coumadin 7.5 mg today and then continue 10 mg MWF and 15 mg TThSSu. Begin hold for procedure 8/10 til 8/15 (day of procedure). Begin Coumadin 20 mg x4 days 8/16/19 to 8/19/19 and then resume normal dose of 10 mg MWF and 15 mg TThSSu. See below for Lovenox instructions. Recheck INR in 3.5 weeks. Lovenox script sent to [facility] per patient request. Patient reminded to call the Anticoagulation Clinic with signs or symptoms of bleeding or with any medication changes. Patient given instructions utilizing the teach back method. Discharged ambulatory in no apparent distress. After visit summary printed and reviewed with patient.       Medications reviewed and updated on home medication list Yes    Influenza vaccine:     [] given    [x]

## 2019-08-14 ENCOUNTER — ANESTHESIA EVENT (OUTPATIENT)
Dept: OPERATING ROOM | Age: 54
End: 2019-08-14
Payer: COMMERCIAL

## 2019-08-14 NOTE — H&P
Women[facility] [address] [facility]    Patient Name: Stephen Villanueva   Patient ID: [id number]   Sex: Female   YOB: 1965         Create Date: August 14, 2019                   History Of Present Illness      The patient presents for a hysteroscopy D and C for PMB. The risks of the procedure were reviewed including infection and hemorrhage. Alternatives to the procedure were also discussed including doing nothing and medical treatments. The procedure was reviewed in detail as well as what to expect postoperatively. All the patient's questions were answered and she demonstrated an understanding of our discussion. Her last normal menstrual period was over 10 years ago. She complains of vaginal bleeding. The patient states she has daily vaginal bleeding, describes the blood as light brown, denies clots. Wears a panty liner daily and will change it three times a day. She has no additional complaints. She denies pelvic pain, abdominal pain, cramping, dysuria, and constipation. The following aggravating factors are noted: when shes more active. There are no alleviating factors reported. The patient's past medical history is notable for a history of blood clots requiring long term anticoagulation.            Past Medical History   Disease Name Date Onset Notes   Asthma, unspecified 05/29/2015    Cardiomegaly 05/29/2015 per pt   Diabetes --  --    Diverticulitis --  --    h/o PTE 07/23/2014 PE x 2   Mitral and aortic valve diseases, unspecified 05/29/2015 per pt   Morbid obesity 07/23/2014    Postmenopausal Bleeding 05/13/2019 --    Postmenopausal Bleeding 07/23/2014    Pulmonary embolism 05/29/2015    Unspecified polyarthropathy or polyarthritis; multiple sites 05/29/2015 osteoarthritis           Past Surgical History   Procedure Name Date Notes   Ankle surgery 01/01/2010 left ankle   Colonoscopy 11/2016 Colonoscopy good for 5 years   Hysteroscopy with D and C 08/22/2014 with myosure polypectomy, 216lbs 0oz 5'  4\" 37.08 2.1             Physical Examination   ConstitutionalAppearance : obese, well developed, alert, in no acute distress   EyesConjunctiva/Eyelids : conjunctiva normal, eyelid appearance normal   Sclera : sclera white   Pupils and Irises : pupils equal and round, pupils reactive to light bilaterally   HENTHead and Face :   Head : normocephalic, atraumatic   NeckLymph Nodes : no lymphadenopathy present   Thyroid : gland size normal, nontender, no nodules or masses present on palpation   ChestRespiratory Effort : breathing unlabored   Auscultation : normal breath sounds, no rales, no rhonchi   CardiovascularHeart :    Auscultation : regular rate, normal rhythm, no murmurs present   Peripheral Vascular System :   Peripheral Circulation : no edema, no cyanosis   BreastsInspection of Breasts : breasts symmetrical, no skin changes, no discharge present   Palpation of Breasts and Axillae : no masses present on palpation, no breast tenderness   Axillary Lymph Nodes : no lymphadenopathy present   GastrointestinalAbdominal Examination : abdomen nontender to palpation, normal bowel sounds, tone normal without rigidity or guarding, no masses present   Liver and spleen : no hepatomegaly present, liver nontender to palpation   Hernias : no hernias present   GenitourinaryExternal Genitalia : normal appearance for age, no discharge or inflammatory lesions present   Vagina : no discharge present, no inflammatory lesions present, no masses present, normal vaginal vault   Bladder : nontender to palpation, no cystocele present   Urethra :   Urethral Body : urethra palpation normal, urethra structural support normal, not hypermobile   Urethral Meatus : no erythema or lesions present   Cervix : appearance healthy, no lesions present, nontender to palpation, no discharges   Uterus : nontender to palpation, no masses present, contour: smooth to palpation, position midline/midplane, size normal   Adnexa : no adnexal tenderness present, no adnexal masses present   Perineum : perineum within normal limits, no evidence of trauma, no rashes or skin lesions present   Anus : anus within normal limits, no hemorrhoids present   LymphaticLymph Nodes : no other lymphadenopathy present   SkinGeneral Inspection : no rashes present, no lesions present, no areas of discoloration   Genitalia and Groin : no rashes present, no lesions present, no areas of discoloration, no masses present   Neurologic/PsychiatricMental Status :   Orientation : grossly oriented to person, place and time   Judgment and Insight : judgment and insight intact   Mood and Affect : mood normal, affect appropriate   Attention Span : attention span normal           Assessment   Postmenopausal Bleeding     627.1/N95.0        Plan      Hysteroscopy D&C                 Electronically Signed by: Jyoti Wyatt.  Queta Silva on August 14, 2019 06:49:09 PM

## 2019-08-15 ENCOUNTER — HOSPITAL ENCOUNTER (OUTPATIENT)
Age: 54
Setting detail: OUTPATIENT SURGERY
Discharge: HOME | End: 2019-08-15
Attending: OBSTETRICS & GYNECOLOGY | Admitting: OBSTETRICS & GYNECOLOGY
Payer: COMMERCIAL

## 2019-08-15 ENCOUNTER — ANESTHESIA (OUTPATIENT)
Dept: OPERATING ROOM | Age: 54
End: 2019-08-15
Payer: COMMERCIAL

## 2019-08-15 VITALS
OXYGEN SATURATION: 97 % | RESPIRATION RATE: 16 BRPM | HEIGHT: 64 IN | BODY MASS INDEX: 37.39 KG/M2 | WEIGHT: 219 LBS | DIASTOLIC BLOOD PRESSURE: 71 MMHG | HEART RATE: 81 BPM | TEMPERATURE: 97.3 F | SYSTOLIC BLOOD PRESSURE: 127 MMHG

## 2019-08-15 VITALS
TEMPERATURE: 96.8 F | DIASTOLIC BLOOD PRESSURE: 84 MMHG | RESPIRATION RATE: 2 BRPM | SYSTOLIC BLOOD PRESSURE: 123 MMHG | OXYGEN SATURATION: 95 %

## 2019-08-15 DIAGNOSIS — G89.18 POST-OP PAIN: Primary | ICD-10-CM

## 2019-08-15 LAB
APTT: 30.9 SECONDS (ref 22–38)
GLUCOSE BLD-MCNC: 95 MG/DL (ref 70–108)
GLUCOSE BLD-MCNC: 95 MG/DL (ref 70–108)
INR BLD: 1 (ref 0.85–1.13)
PREGNANCY, URINE: NEGATIVE

## 2019-08-15 PROCEDURE — 82948 REAGENT STRIP/BLOOD GLUCOSE: CPT

## 2019-08-15 PROCEDURE — 2580000003 HC RX 258: Performed by: OBSTETRICS & GYNECOLOGY

## 2019-08-15 PROCEDURE — 2500000003 HC RX 250 WO HCPCS: Performed by: ANESTHESIOLOGY

## 2019-08-15 PROCEDURE — 3700000001 HC ADD 15 MINUTES (ANESTHESIA): Performed by: OBSTETRICS & GYNECOLOGY

## 2019-08-15 PROCEDURE — 7100000010 HC PHASE II RECOVERY - FIRST 15 MIN: Performed by: OBSTETRICS & GYNECOLOGY

## 2019-08-15 PROCEDURE — 7100000011 HC PHASE II RECOVERY - ADDTL 15 MIN: Performed by: OBSTETRICS & GYNECOLOGY

## 2019-08-15 PROCEDURE — 36415 COLL VENOUS BLD VENIPUNCTURE: CPT

## 2019-08-15 PROCEDURE — 7100000001 HC PACU RECOVERY - ADDTL 15 MIN: Performed by: OBSTETRICS & GYNECOLOGY

## 2019-08-15 PROCEDURE — 3600000012 HC SURGERY LEVEL 2 ADDTL 15MIN: Performed by: OBSTETRICS & GYNECOLOGY

## 2019-08-15 PROCEDURE — 85610 PROTHROMBIN TIME: CPT

## 2019-08-15 PROCEDURE — 88305 TISSUE EXAM BY PATHOLOGIST: CPT

## 2019-08-15 PROCEDURE — 6360000002 HC RX W HCPCS: Performed by: ANESTHESIOLOGY

## 2019-08-15 PROCEDURE — 7100000000 HC PACU RECOVERY - FIRST 15 MIN: Performed by: OBSTETRICS & GYNECOLOGY

## 2019-08-15 PROCEDURE — 81025 URINE PREGNANCY TEST: CPT

## 2019-08-15 PROCEDURE — 3700000000 HC ANESTHESIA ATTENDED CARE: Performed by: OBSTETRICS & GYNECOLOGY

## 2019-08-15 PROCEDURE — 2709999900 HC NON-CHARGEABLE SUPPLY: Performed by: OBSTETRICS & GYNECOLOGY

## 2019-08-15 PROCEDURE — 85730 THROMBOPLASTIN TIME PARTIAL: CPT

## 2019-08-15 PROCEDURE — 3600000002 HC SURGERY LEVEL 2 BASE: Performed by: OBSTETRICS & GYNECOLOGY

## 2019-08-15 RX ORDER — DEXAMETHASONE SODIUM PHOSPHATE 4 MG/ML
INJECTION, SOLUTION INTRA-ARTICULAR; INTRALESIONAL; INTRAMUSCULAR; INTRAVENOUS; SOFT TISSUE PRN
Status: DISCONTINUED | OUTPATIENT
Start: 2019-08-15 | End: 2019-08-15 | Stop reason: SDUPTHER

## 2019-08-15 RX ORDER — HYDROCODONE BITARTRATE AND ACETAMINOPHEN 5; 325 MG/1; MG/1
1 TABLET ORAL EVERY 4 HOURS PRN
Status: CANCELLED | OUTPATIENT
Start: 2019-08-15

## 2019-08-15 RX ORDER — ACETAMINOPHEN 325 MG/1
650 TABLET ORAL EVERY 4 HOURS PRN
Status: CANCELLED | OUTPATIENT
Start: 2019-08-15

## 2019-08-15 RX ORDER — FENTANYL CITRATE 50 UG/ML
50 INJECTION, SOLUTION INTRAMUSCULAR; INTRAVENOUS EVERY 5 MIN PRN
Status: DISCONTINUED | OUTPATIENT
Start: 2019-08-15 | End: 2019-08-15 | Stop reason: HOSPADM

## 2019-08-15 RX ORDER — FENTANYL CITRATE 50 UG/ML
25 INJECTION, SOLUTION INTRAMUSCULAR; INTRAVENOUS EVERY 5 MIN PRN
Status: DISCONTINUED | OUTPATIENT
Start: 2019-08-15 | End: 2019-08-15 | Stop reason: HOSPADM

## 2019-08-15 RX ORDER — DOCUSATE SODIUM 100 MG/1
100 CAPSULE, LIQUID FILLED ORAL 2 TIMES DAILY
Status: CANCELLED | OUTPATIENT
Start: 2019-08-15

## 2019-08-15 RX ORDER — SODIUM CHLORIDE, SODIUM LACTATE, POTASSIUM CHLORIDE, CALCIUM CHLORIDE 600; 310; 30; 20 MG/100ML; MG/100ML; MG/100ML; MG/100ML
INJECTION, SOLUTION INTRAVENOUS SEE ADMIN INSTRUCTIONS
Status: CANCELLED | OUTPATIENT
Start: 2019-08-15

## 2019-08-15 RX ORDER — HYDROCODONE BITARTRATE AND ACETAMINOPHEN 5; 325 MG/1; MG/1
1 TABLET ORAL EVERY 6 HOURS PRN
Qty: 6 TABLET | Refills: 0 | Status: SHIPPED | OUTPATIENT
Start: 2019-08-15 | End: 2019-08-18

## 2019-08-15 RX ORDER — HYDROCODONE BITARTRATE AND ACETAMINOPHEN 5; 325 MG/1; MG/1
2 TABLET ORAL EVERY 4 HOURS PRN
Status: CANCELLED | OUTPATIENT
Start: 2019-08-15

## 2019-08-15 RX ORDER — SODIUM CHLORIDE, SODIUM LACTATE, POTASSIUM CHLORIDE, CALCIUM CHLORIDE 600; 310; 30; 20 MG/100ML; MG/100ML; MG/100ML; MG/100ML
INJECTION, SOLUTION INTRAVENOUS CONTINUOUS
Status: DISCONTINUED | OUTPATIENT
Start: 2019-08-15 | End: 2019-08-15 | Stop reason: HOSPADM

## 2019-08-15 RX ORDER — PROPOFOL 10 MG/ML
INJECTION, EMULSION INTRAVENOUS PRN
Status: DISCONTINUED | OUTPATIENT
Start: 2019-08-15 | End: 2019-08-15 | Stop reason: SDUPTHER

## 2019-08-15 RX ORDER — MORPHINE SULFATE 2 MG/ML
4 INJECTION, SOLUTION INTRAMUSCULAR; INTRAVENOUS
Status: CANCELLED | OUTPATIENT
Start: 2019-08-15

## 2019-08-15 RX ORDER — FENTANYL CITRATE 50 UG/ML
INJECTION, SOLUTION INTRAMUSCULAR; INTRAVENOUS PRN
Status: DISCONTINUED | OUTPATIENT
Start: 2019-08-15 | End: 2019-08-15 | Stop reason: SDUPTHER

## 2019-08-15 RX ORDER — SUCCINYLCHOLINE/SOD CL,ISO/PF 200MG/10ML
SYRINGE (ML) INTRAVENOUS PRN
Status: DISCONTINUED | OUTPATIENT
Start: 2019-08-15 | End: 2019-08-15 | Stop reason: SDUPTHER

## 2019-08-15 RX ORDER — ONDANSETRON 2 MG/ML
8 INJECTION INTRAMUSCULAR; INTRAVENOUS EVERY 8 HOURS PRN
Status: CANCELLED | OUTPATIENT
Start: 2019-08-15

## 2019-08-15 RX ORDER — ONDANSETRON 2 MG/ML
8 INJECTION INTRAMUSCULAR; INTRAVENOUS EVERY 8 HOURS PRN
Status: DISCONTINUED | OUTPATIENT
Start: 2019-08-15 | End: 2019-08-15 | Stop reason: HOSPADM

## 2019-08-15 RX ORDER — MIDAZOLAM HYDROCHLORIDE 1 MG/ML
INJECTION INTRAMUSCULAR; INTRAVENOUS PRN
Status: DISCONTINUED | OUTPATIENT
Start: 2019-08-15 | End: 2019-08-15 | Stop reason: SDUPTHER

## 2019-08-15 RX ORDER — ONDANSETRON 2 MG/ML
INJECTION INTRAMUSCULAR; INTRAVENOUS PRN
Status: DISCONTINUED | OUTPATIENT
Start: 2019-08-15 | End: 2019-08-15 | Stop reason: SDUPTHER

## 2019-08-15 RX ORDER — PROMETHAZINE HYDROCHLORIDE 25 MG/ML
12.5 INJECTION, SOLUTION INTRAMUSCULAR; INTRAVENOUS
Status: DISCONTINUED | OUTPATIENT
Start: 2019-08-15 | End: 2019-08-15 | Stop reason: HOSPADM

## 2019-08-15 RX ORDER — LABETALOL 20 MG/4 ML (5 MG/ML) INTRAVENOUS SYRINGE
10 EVERY 10 MIN PRN
Status: DISCONTINUED | OUTPATIENT
Start: 2019-08-15 | End: 2019-08-15 | Stop reason: HOSPADM

## 2019-08-15 RX ORDER — MORPHINE SULFATE 2 MG/ML
2 INJECTION, SOLUTION INTRAMUSCULAR; INTRAVENOUS
Status: CANCELLED | OUTPATIENT
Start: 2019-08-15

## 2019-08-15 RX ORDER — LIDOCAINE HCL/PF 100 MG/5ML
SYRINGE (ML) INJECTION PRN
Status: DISCONTINUED | OUTPATIENT
Start: 2019-08-15 | End: 2019-08-15 | Stop reason: SDUPTHER

## 2019-08-15 RX ADMIN — DEXAMETHASONE SODIUM PHOSPHATE 4 MG: 4 INJECTION, SOLUTION INTRAMUSCULAR; INTRAVENOUS at 12:26

## 2019-08-15 RX ADMIN — FENTANYL CITRATE 100 MCG: 50 INJECTION INTRAMUSCULAR; INTRAVENOUS at 12:12

## 2019-08-15 RX ADMIN — SODIUM CHLORIDE, POTASSIUM CHLORIDE, SODIUM LACTATE AND CALCIUM CHLORIDE: 600; 310; 30; 20 INJECTION, SOLUTION INTRAVENOUS at 11:22

## 2019-08-15 RX ADMIN — Medication 60 MG: at 12:12

## 2019-08-15 RX ADMIN — FENTANYL CITRATE 25 MCG: 50 INJECTION, SOLUTION INTRAMUSCULAR; INTRAVENOUS at 12:56

## 2019-08-15 RX ADMIN — FENTANYL CITRATE 25 MCG: 50 INJECTION, SOLUTION INTRAMUSCULAR; INTRAVENOUS at 13:20

## 2019-08-15 RX ADMIN — PROPOFOL 160 MG: 10 INJECTION, EMULSION INTRAVENOUS at 12:12

## 2019-08-15 RX ADMIN — FENTANYL CITRATE 50 MCG: 50 INJECTION, SOLUTION INTRAMUSCULAR; INTRAVENOUS at 13:15

## 2019-08-15 RX ADMIN — MIDAZOLAM HYDROCHLORIDE 2 MG: 1 INJECTION, SOLUTION INTRAMUSCULAR; INTRAVENOUS at 12:11

## 2019-08-15 RX ADMIN — ONDANSETRON HYDROCHLORIDE 4 MG: 4 INJECTION, SOLUTION INTRAMUSCULAR; INTRAVENOUS at 12:26

## 2019-08-15 RX ADMIN — Medication 160 MG: at 12:12

## 2019-08-15 ASSESSMENT — PAIN DESCRIPTION - ORIENTATION: ORIENTATION: MID

## 2019-08-15 ASSESSMENT — PULMONARY FUNCTION TESTS
PIF_VALUE: 0
PIF_VALUE: 2
PIF_VALUE: 19
PIF_VALUE: 17
PIF_VALUE: 19
PIF_VALUE: 4
PIF_VALUE: 1
PIF_VALUE: 1
PIF_VALUE: 19
PIF_VALUE: 16
PIF_VALUE: 17
PIF_VALUE: 7
PIF_VALUE: 16
PIF_VALUE: 0
PIF_VALUE: 1
PIF_VALUE: 2
PIF_VALUE: 1
PIF_VALUE: 28
PIF_VALUE: 2
PIF_VALUE: 32
PIF_VALUE: 0
PIF_VALUE: 1
PIF_VALUE: 17
PIF_VALUE: 2
PIF_VALUE: 1
PIF_VALUE: 1
PIF_VALUE: 35
PIF_VALUE: 16
PIF_VALUE: 4
PIF_VALUE: 1
PIF_VALUE: 2
PIF_VALUE: 16
PIF_VALUE: 1
PIF_VALUE: 16
PIF_VALUE: 1
PIF_VALUE: 6
PIF_VALUE: 27
PIF_VALUE: 16

## 2019-08-15 ASSESSMENT — PAIN DESCRIPTION - LOCATION: LOCATION: VAGINA

## 2019-08-15 ASSESSMENT — PAIN DESCRIPTION - PAIN TYPE: TYPE: SURGICAL PAIN

## 2019-08-15 ASSESSMENT — PAIN SCALES - GENERAL
PAINLEVEL_OUTOF10: 4
PAINLEVEL_OUTOF10: 4
PAINLEVEL_OUTOF10: 7
PAINLEVEL_OUTOF10: 0
PAINLEVEL_OUTOF10: 5
PAINLEVEL_OUTOF10: 4
PAINLEVEL_OUTOF10: 0
PAINLEVEL_OUTOF10: 4
PAINLEVEL_OUTOF10: 3
PAINLEVEL_OUTOF10: 3

## 2019-08-15 ASSESSMENT — PAIN - FUNCTIONAL ASSESSMENT
PAIN_FUNCTIONAL_ASSESSMENT: 0-10
PAIN_FUNCTIONAL_ASSESSMENT: 0-10

## 2019-08-15 ASSESSMENT — PAIN DESCRIPTION - DESCRIPTORS: DESCRIPTORS: TENDER

## 2019-08-15 ASSESSMENT — PAIN DESCRIPTION - FREQUENCY: FREQUENCY: CONTINUOUS

## 2019-08-15 NOTE — DISCHARGE SUMMARY
mouth as needed Don't take more than 3,000 mg per day.        montelukast (SINGULAIR) 10 MG tablet Take 10 mg by mouth daily      Albuterol Sulfate (VENTOLIN HFA IN) Inhale 2 puffs into the lungs every 4 hours as needed Indications: Asthma               Diet regular  Condition: Stable  Discharge to:  home  Follow up in 1-2 wks    Patient to be discharged on 8/15/19

## 2019-08-15 NOTE — PROGRESS NOTES
1342:  Patient returned to [facility] room 9 from PACU. Report received from Ita Davis, PACU RN.  9781:  Small amount of old sanguinous drainage on radha pad. Patient eating jeny crackers and tolerating [address] [place] [address].  Family at bedside. Call light in reach.
Left message with Dr. Rubina Orta' office ext. [phone] that we still need labs, H&P and orders for pre op chart for surgery scheduled 8/15/19. Thank you!
left upper arm

## 2019-08-15 NOTE — ANESTHESIA PRE PROCEDURE
08/15/19 1122      ondansetron (ZOFRAN) injection 8 mg  8 mg Intravenous Q8H PRN Michelle Face, DO           Allergies:  No Known Allergies    Problem List:    Patient Active Problem List   Diagnosis Code    Hx of Recurrent  pulmonary embolism dec 2009 and 09/2013- need to cont life long coumadin I26.99    hx of Syncope and collapse due to, PE R55    Obesity E66.9    Abnormal echocardiogram EF 55%, RVE and RV function reduced post PE R93.1    SOB (shortness of breath) on exertion- improving R06.02    Pulmonary embolus (HCC) I26.99    Deep vein thrombosis (HCC) I82.409    [place] filter in place Z95.828    Anticoagulated on Coumadin Z79.01    Sinus bradycardia -  and pauses only in the night range from 2.1 to 3.7 sec pause-pat Dxed with TERESA 2013 and cpap- Holter monitor after CPAP got better R00.1    TERESA (obstructive sleep apnea) G47.33    Plantar fasciitis, right M72.2    Asthma J45.909    Type 2 diabetes mellitus (HCC) E11.9    Abnormal EKG R94.31       Past Medical History:        Diagnosis Date    Asthma     Depression     Diverticulitis     History of deep vein thrombophlebitis of lower extremity     Osteopenia     PE (pulmonary embolism)     x2    Plantar fasciitis, right 3/2016    Sleep apnea     tested Dec 2013 needs CPAP yet    Type 2 [facility] mellitus without complication [facility] 98/88/9688       Past Surgical History:        Procedure Laterality Date    ANKLE SURGERY      Left Ankle.  plate    OTHER SURGICAL HISTORY      right wrist for tendonitis    TOE SURGERY  06/25/2019    LEFT SECOND TOE    VENA CAVA FILTER PLACEMENT         Social History:    Social History     Tobacco Use    Smoking status: Never Smoker    Smokeless tobacco: Never Used   Substance Use Topics    Alcohol use: Yes     Comment: Ocassionally                                Counseling given: Not Answered      Vital Signs (Current):   Vitals:    08/08/19 1141 08/15/19 1045 08/15/19 1048   BP:  113/89

## 2019-08-15 NOTE — ANESTHESIA POSTPROCEDURE EVALUATION
src:Temporal    Level of Consciousness:  Awake    Respiratory:  Stable    Oxygen Saturation:  Stable    Cardiovascular:  Stable    Hydration:  Adequate    PONV:  Stable    Post-op Pain:  Adequate analgesia    Post-op Assessment:  No apparent anesthetic complications    Additional Follow-Up / Treatment / Comment:  None    Jaki Daniels DO  August 15, 2019   2:52 PM

## 2019-08-21 ENCOUNTER — HOSPITAL ENCOUNTER (OUTPATIENT)
Dept: PHARMACY | Age: 54
Setting detail: THERAPIES SERIES
Discharge: HOME | End: 2019-08-21
Payer: COMMERCIAL

## 2019-08-21 DIAGNOSIS — I26.99 OTHER ACUTE PULMONARY EMBOLISM WITHOUT ACUTE COR PULMONALE (HCC): ICD-10-CM

## 2019-08-21 LAB — POC INR: 2.5 (ref 0.8–1.2)

## 2019-08-21 PROCEDURE — 36416 COLLJ CAPILLARY BLOOD SPEC: CPT

## 2019-08-21 PROCEDURE — 99211 OFF/OP EST MAY X REQ PHY/QHP: CPT

## 2019-08-21 PROCEDURE — 85610 PROTHROMBIN TIME: CPT

## 2019-09-03 ENCOUNTER — HOSPITAL ENCOUNTER (OUTPATIENT)
Dept: PHARMACY | Age: 54
Setting detail: THERAPIES SERIES
Discharge: HOME | End: 2019-09-03
Payer: COMMERCIAL

## 2019-09-03 DIAGNOSIS — I26.99 OTHER ACUTE PULMONARY EMBOLISM WITHOUT ACUTE COR PULMONALE (HCC): ICD-10-CM

## 2019-09-03 LAB — POC INR: 3.1 (ref 0.8–1.2)

## 2019-09-03 PROCEDURE — 85610 PROTHROMBIN TIME: CPT

## 2019-09-03 PROCEDURE — 99211 OFF/OP EST MAY X REQ PHY/QHP: CPT

## 2019-09-03 PROCEDURE — 36416 COLLJ CAPILLARY BLOOD SPEC: CPT

## 2019-09-23 ENCOUNTER — HOSPITAL ENCOUNTER (OUTPATIENT)
Dept: PHARMACY | Age: 54
Setting detail: THERAPIES SERIES
Discharge: HOME | End: 2019-09-23
Payer: COMMERCIAL

## 2019-09-23 DIAGNOSIS — I26.99 OTHER ACUTE PULMONARY EMBOLISM WITHOUT ACUTE COR PULMONALE (HCC): ICD-10-CM

## 2019-09-23 LAB — POC INR: 2.5 (ref 0.8–1.2)

## 2019-09-23 PROCEDURE — 36416 COLLJ CAPILLARY BLOOD SPEC: CPT

## 2019-09-23 PROCEDURE — 99211 OFF/OP EST MAY X REQ PHY/QHP: CPT

## 2019-09-23 PROCEDURE — 85610 PROTHROMBIN TIME: CPT

## 2019-09-23 NOTE — PROGRESS NOTES
Medication Management [facility]  Anticoagulation Clinic  [phone] (phone)  [phone] (fax)      Ms. Estrella Tai is a 47 y.o.  female with history of PE who presents today for anticoagulation monitoring and adjustment. Patient verifies current dosing regimen and tablet strength. No missed or extra doses. Patient denies s/s bleeding/bruising/swelling/SOB/chest pain  No blood in urine or stool. No dietary changes. No changes in medication/OTC agents/Herbals. No change in alcohol use or tobacco use. No change in activity level. Patient denies headaches/dizziness/lightheadedness/falls. No vomiting/diarrhea or acute illness. No Procedures scheduled in the future at this time. Assessment:   Lab Results   Component Value Date    INR 2.50 (H) 09/23/2019    INR 3.10 (H) 09/03/2019    INR 2.50 (H) 08/21/2019     INR therapeutic   Recent Labs     09/23/19  1609   INR 2.50*       Plan:  Continue Coumadin 10 mg MWF 15 mg SuTuThSa. Recheck INR in 3 weeks. Patient reminded to call the Anticoagulation Clinic with any signs or symptoms of bleeding or with any medication changes. Patient given instructions utilizing the teach back method. Discharged ambulatory in no apparent distress. After visit summary printed and reviewed with patient.       Medications reviewed and updated on home medication list Yes    Influenza vaccine:     [] given    [] declined   [] received previously   [] plans to receive at a later time   [] refused    [x] documented in EPIC

## 2019-10-10 ENCOUNTER — TELEPHONE (OUTPATIENT)
Dept: CARDIOLOGY CLINIC | Age: 54
End: 2019-10-10

## 2019-10-10 DIAGNOSIS — I26.99 OTHER ACUTE PULMONARY EMBOLISM WITHOUT ACUTE COR PULMONALE (HCC): Primary | ICD-10-CM

## 2019-10-15 ENCOUNTER — HOSPITAL ENCOUNTER (OUTPATIENT)
Dept: PHARMACY | Age: 54
Setting detail: THERAPIES SERIES
Discharge: HOME | End: 2019-10-15
Payer: COMMERCIAL

## 2019-10-15 DIAGNOSIS — I26.99 OTHER ACUTE PULMONARY EMBOLISM WITHOUT ACUTE COR PULMONALE (HCC): ICD-10-CM

## 2019-10-15 LAB — POC INR: 2.3 (ref 0.8–1.2)

## 2019-10-15 PROCEDURE — 85610 PROTHROMBIN TIME: CPT

## 2019-10-15 PROCEDURE — 36416 COLLJ CAPILLARY BLOOD SPEC: CPT

## 2019-10-15 PROCEDURE — 99211 OFF/OP EST MAY X REQ PHY/QHP: CPT

## 2019-11-12 ENCOUNTER — HOSPITAL ENCOUNTER (OUTPATIENT)
Dept: PHARMACY | Age: 54
Setting detail: THERAPIES SERIES
Discharge: HOME | End: 2019-11-12
Payer: COMMERCIAL

## 2019-11-12 DIAGNOSIS — I26.99 OTHER ACUTE PULMONARY EMBOLISM WITHOUT ACUTE COR PULMONALE (HCC): ICD-10-CM

## 2019-11-12 LAB — POC INR: 2.1 (ref 0.8–1.2)

## 2019-11-12 PROCEDURE — 36416 COLLJ CAPILLARY BLOOD SPEC: CPT

## 2019-11-12 PROCEDURE — 99211 OFF/OP EST MAY X REQ PHY/QHP: CPT

## 2019-11-12 PROCEDURE — 85610 PROTHROMBIN TIME: CPT

## 2019-12-10 ENCOUNTER — HOSPITAL ENCOUNTER (OUTPATIENT)
Dept: PHARMACY | Age: 54
Setting detail: THERAPIES SERIES
Discharge: HOME | End: 2019-12-10
Payer: COMMERCIAL

## 2019-12-10 DIAGNOSIS — I26.99 OTHER ACUTE PULMONARY EMBOLISM WITHOUT ACUTE COR PULMONALE (HCC): ICD-10-CM

## 2019-12-10 PROBLEM — K57.32 DIVERTICULITIS OF LARGE INTESTINE: Status: ACTIVE | Noted: 2017-10-13

## 2019-12-10 PROBLEM — I10 ESSENTIAL HYPERTENSION: Status: ACTIVE | Noted: 2017-05-05

## 2019-12-10 LAB — POC INR: 2.3 (ref 0.8–1.2)

## 2019-12-10 PROCEDURE — 85610 PROTHROMBIN TIME: CPT

## 2019-12-10 PROCEDURE — 99211 OFF/OP EST MAY X REQ PHY/QHP: CPT

## 2019-12-10 PROCEDURE — 36416 COLLJ CAPILLARY BLOOD SPEC: CPT

## 2019-12-30 ENCOUNTER — HOSPITAL ENCOUNTER (EMERGENCY)
Age: 54
Discharge: HOME | End: 2019-12-30
Payer: COMMERCIAL

## 2019-12-30 ENCOUNTER — APPOINTMENT (OUTPATIENT)
Dept: GENERAL RADIOLOGY | Age: 54
End: 2019-12-30
Payer: COMMERCIAL

## 2019-12-30 VITALS
WEIGHT: 221 LBS | DIASTOLIC BLOOD PRESSURE: 93 MMHG | OXYGEN SATURATION: 100 % | TEMPERATURE: 98.4 F | RESPIRATION RATE: 14 BRPM | SYSTOLIC BLOOD PRESSURE: 133 MMHG | HEIGHT: 64 IN | HEART RATE: 76 BPM | BODY MASS INDEX: 37.73 KG/M2

## 2019-12-30 PROCEDURE — 99283 EMERGENCY DEPT VISIT LOW MDM: CPT

## 2019-12-30 PROCEDURE — 73523 X-RAY EXAM HIPS BI 5/> VIEWS: CPT

## 2019-12-30 ASSESSMENT — ENCOUNTER SYMPTOMS
ABDOMINAL PAIN: 0
NAUSEA: 0
BACK PAIN: 1
SHORTNESS OF BREATH: 0
VOMITING: 0

## 2019-12-30 ASSESSMENT — PAIN DESCRIPTION - DESCRIPTORS: DESCRIPTORS: ACHING

## 2019-12-30 ASSESSMENT — PAIN DESCRIPTION - FREQUENCY: FREQUENCY: CONTINUOUS

## 2019-12-30 ASSESSMENT — PAIN DESCRIPTION - PAIN TYPE: TYPE: CHRONIC PAIN

## 2019-12-30 ASSESSMENT — PAIN SCALES - GENERAL: PAINLEVEL_OUTOF10: 3

## 2019-12-30 ASSESSMENT — PAIN DESCRIPTION - LOCATION: LOCATION: BACK;HIP;LEG

## 2019-12-30 ASSESSMENT — PAIN DESCRIPTION - ORIENTATION: ORIENTATION: LEFT;RIGHT

## 2019-12-30 NOTE — ED TRIAGE NOTES
Patient presents to ER with complaints of lower back pain, bilateral hip pain, and bilateral leg pain that has been chronic since September.

## 2019-12-30 NOTE — ED PROVIDER NOTES
[facility]  eMERGENCY dEPARTMENT eNCOUnter          [address]       Chief Complaint   Patient presents with    Leg Pain     bilateral    Hip Pain     bilateral    Back Pain     lower       Nurses Notes reviewed and I agree except as noted in the HPI. HISTORY OF PRESENT ILLNESS    Wendy Nazario is a 47 y.o. female who presents to the Emergency Department for the evaluation of bilateral leg, hip, and back pain. Patient has chronic back pain due to arthritis. Patient had surgery on her left toe in June 2019 ad had to wear a boot. Patient states when she returned to work in August after wearing the boot, she developed left hip pain. Patient states she thinks she has been compensating and putting more weight on her right hip and she now has pain in her right hip. Patient states she has aching in her bilateral legs that is new as well. Patient reports she is on Coumadin but denies any color changes or loss of sensation in her legs. Patient has chronic ankle swelling. Patient denies calf tenderness, swelling, numbness, or tingling in her legs. Patient has an appointment with PCP next week. Patient denies any other complaints at this time. REVIEW OF SYSTEMS     Review of Systems   Constitutional: Negative for fever. Respiratory: Negative for shortness of breath. Cardiovascular: Negative for chest pain. Gastrointestinal: Negative for abdominal pain, nausea and vomiting. Musculoskeletal: Positive for arthralgias (bilateral hips), back pain and myalgias (bilateral leg pain). PAST MEDICAL HISTORY    has a past medical history of Asthma, Depression, Diverticulitis, History of deep vein thrombophlebitis of lower extremity, Osteopenia, PE (pulmonary embolism), Plantar fasciitis, right, Sleep apnea, and Type 2 diabetes mellitus without complication ([facility] SURGICAL HISTORY      has a past surgical history that includes Vena Cava Filter Placement;  Ankle surgery; other surgical history; Toe Surgery (2019); and Dilation and curettage of uterus (N/A, 8/15/2019). CURRENT MEDICATIONS       Discharge Medication List as of 2019  3:11 PM      CONTINUE these medications which have NOT CHANGED    Details   warfarin (COUMADIN) 5 MG tablet TAKE AS DIRECTED BY [facility], Disp-270 tablet, R-3Normal      montelukast (SINGULAIR) 10 MG tablet Take 10 mg by mouth dailyHistorical Med      alendronate (FOSAMAX) 70 MG tablet Take 70 mg by mouth every 28 days Historical Med      metFORMIN (GLUCOPHAGE) 500 MG tablet take 1 tablet by mouth three times a day with food, R-0Historical Med      lisinopril (PRINIVIL;ZESTRIL) 5 MG tablet Take 5 mg by mouth daily Indications: Diabetes, Raised Blood PressureHistorical Med      atorvastatin (LIPITOR) 20 MG tablet Take 20 mg by mouth daily Indications: Blood Cholesterol Abnormal      Albuterol Sulfate (VENTOLIN HFA IN) Inhale 2 puffs into the lungs every 4 hours as needed Indications: Asthma      Calcium Carbonate (CALTRATE 600 PO) Take 2 tablets by mouth daily supplement      Acetaminophen (TYLENOL PO) Take 1,000 mg by mouth as needed Don't take more than 3,000 mg per day. Historical Med             ALLERGIES     has No Known Allergies. FAMILY HISTORY     She indicated that her mother is . She indicated that her father is . She indicated that her sister is alive. She indicated that her brother is . She indicated that the status of her maternal grandmother is unknown.   family history includes Cancer in her mother; Diabetes in her brother; High Blood Pressure in her sister; High Cholesterol in her sister; Stroke in her maternal grandmother. SOCIAL HISTORY      reports that she has never smoked. She has never used smokeless tobacco. She reports current alcohol use. She reports that she does not use drugs. PHYSICAL EXAM     INITIAL VITALS:  height is 5' 4\" (1.626 m) and weight is 221 lb (100.2 kg).  Her oral recognition software. It may contain minor errors which are inherent in voice recognition technology. **      Final report electronically signed by Dr. Gagandeep Naylor on 12/30/2019 2:30 PM            LABS:   Labs Reviewed - No data to display    EMERGENCYDEPARTMENTCOURSE:   Vitals:    Vitals:    12/30/19 1335   BP: (!) 133/93   Pulse: 76   Resp: 14   Temp: 98.4 °F (36.9 °C)   TempSrc: Oral   SpO2: 100%   Weight: 221 lb (100.2 kg)   Height: 5' 4\" (1.626 m)     Patient was seen history physical exam was performed. See disposition below    MDM:  Patient's x-rays show degenerative changes without acute fracture or dislocation. She has mild narrowing of the SI joints. She is able to ambulates and has no signs of cauda equina. She is amenable to plan of discharge follow-up with her primary care provider for further evaluation as needed. Anticipatory guidance given and patient is comfortable with plan of care. They will follow up with PCP for follow up or further evaluation if symptoms continue. They will return to the ED with worsening of symptoms and we discussed reasons for returning. CRITICAL CARE:   None    CONSULTS:  None    PROCEDURES:  None    FINAL IMPRESSION      1. Osteoarthritis of multiple joints, unspecified osteoarthritis type          DISPOSITION/PLAN   Discharge    PATIENT REFERREDTO:  LUIZ Fuller - [facility]  [address]  [phone]    Schedule an appointment as soon as possible for a visit   for chronic pain and aching secondary to osteoarthritis, further care and evaluation as needed      DISCHARGE MEDICATIONS:  Discharge Medication List as of 12/30/2019  3:11 PM          (Please note that portions of this note were completed with a voice recognition program.  Efforts were made to edit the dictations but occasionally words are mis-transcribed.)    The patient was given an opportunity to see the Emergency Attending.  The patient voiced understanding that I was a

## 2020-01-14 ENCOUNTER — HOSPITAL ENCOUNTER (OUTPATIENT)
Dept: PHARMACY | Age: 55
Setting detail: THERAPIES SERIES
Discharge: HOME | End: 2020-01-14
Payer: COMMERCIAL

## 2020-01-14 LAB — POC INR: 2.3 (ref 0.8–1.2)

## 2020-01-14 PROCEDURE — 85610 PROTHROMBIN TIME: CPT

## 2020-01-14 PROCEDURE — 99211 OFF/OP EST MAY X REQ PHY/QHP: CPT

## 2020-01-14 PROCEDURE — 36416 COLLJ CAPILLARY BLOOD SPEC: CPT

## 2020-01-15 LAB
HCT VFR BLD CALC: 38.9 % (ref 35–44)
HEMOGLOBIN: 12.9 GM/DL (ref 12–15)

## 2020-02-18 ENCOUNTER — HOSPITAL ENCOUNTER (OUTPATIENT)
Dept: PHARMACY | Age: 55
Setting detail: THERAPIES SERIES
Discharge: HOME | End: 2020-02-18
Payer: COMMERCIAL

## 2020-02-18 LAB — POC INR: 3.6 (ref 0.8–1.2)

## 2020-02-18 PROCEDURE — 99211 OFF/OP EST MAY X REQ PHY/QHP: CPT

## 2020-02-18 PROCEDURE — 36416 COLLJ CAPILLARY BLOOD SPEC: CPT

## 2020-02-18 PROCEDURE — 85610 PROTHROMBIN TIME: CPT

## 2020-02-18 RX ORDER — TRIAMCINOLONE ACETONIDE 55 UG/1
1 SPRAY, METERED NASAL DAILY
COMMUNITY
Start: 2020-01-30

## 2020-02-18 NOTE — PROGRESS NOTES
Medication Management [address]  [phone] (phone)  [phone] (fax)      Ms. Joselito Stearns is a 47 y.o.  female with history of PE who presents today for anticoagulation monitoring and adjustment. Patient verifies current dosing regimen and tablet strength. No missed or extra doses. Patient denies s/s bleeding/bruising/swelling/SOB/chest pain. No blood in urine or stool. Has not been eating dark greens for last 3-4 weeks. Plans to restart. No changes in medication/OTC agents/Herbals. No change in alcohol use or tobacco use. Increase in activity level. Doing lightweight cardio one day per week. Patient denies headaches/dizziness/lightheadedness/falls. No vomiting/diarrhea or acute illness. No procedures scheduled in the future at this time. Assessment:   Lab Results   Component Value Date    INR 3.60 (H) 02/18/2020    INR 2.30 (H) 01/14/2020    INR 2.30 (H) 12/10/2019     INR supratherapeutic   Recent Labs     02/18/20  1603   INR 3.60*     Plan: POCT INR ordered and result reviewed with DERRICK Randall, Pharm. D. Order received and verified to hold coumadin today, then continue Coumadin 10 mg po MWF, 15 mg po TTHSS. Recheck INR in 3 weeks. Patient reminded to call the Anticoagulation Clinic with any signs or symptoms of bleeding or with any medication changes. Patient given instructions utilizing the teach back method. Discharged ambulatory in no apparent distress. After visit summary printed and reviewed with patient.       Medications reviewed and updated on home medication list Yes    Influenza vaccine:     [] given    [] declined   [] received previously   [] plans to receive at a later time   [] refused    [] documented in EPIC

## 2020-03-10 ENCOUNTER — HOSPITAL ENCOUNTER (OUTPATIENT)
Dept: PHARMACY | Age: 55
Setting detail: THERAPIES SERIES
Discharge: HOME | End: 2020-03-10
Payer: COMMERCIAL

## 2020-03-10 LAB — POC INR: 2.4 (ref 0.8–1.2)

## 2020-03-10 PROCEDURE — 36416 COLLJ CAPILLARY BLOOD SPEC: CPT | Performed by: PHARMACIST

## 2020-03-10 PROCEDURE — 99211 OFF/OP EST MAY X REQ PHY/QHP: CPT | Performed by: PHARMACIST

## 2020-03-10 PROCEDURE — 85610 PROTHROMBIN TIME: CPT | Performed by: PHARMACIST

## 2020-03-10 RX ORDER — WARFARIN SODIUM 5 MG/1
TABLET ORAL
Qty: 270 TABLET | Refills: 3 | Status: SHIPPED | OUTPATIENT
Start: 2020-03-10 | End: 2020-03-16 | Stop reason: SDUPTHER

## 2020-03-10 NOTE — PROGRESS NOTES
Medication Management [address]  [phone] (phone)  [phone] (fax)      Ms. Santiago Grissom is a 47 y.o.  female with history of PE who presents today for anticoagulation monitoring and adjustment. Patient verifies current dosing regimen and tablet strength. No missed or extra doses. Patient denies s/s bleeding/bruising/swelling/SOB/chest pain  No blood in urine or stool. Dietary changes. Back to normal on the dark leafy greens, which was her normal before the last INR check  No changes in medication/OTC agents/Herbals. No change in alcohol use or tobacco use. No change in activity level. Patient denies headaches/dizziness/lightheadedness/falls. No vomiting/diarrhea or acute illness. No Procedures scheduled in the future at this time. Patient interview performed by Hector Tolbert PharmD Candidate 2045. Assessment:   Lab Results   Component Value Date    INR 2.40 (H) 03/10/2020    INR 3.60 (H) 02/18/2020    INR 2.30 (H) 01/14/2020     INR therapeutic   Recent Labs     03/10/20  1553   INR 2.40*       Plan:  Continue Coumadin 10mg WMF, 15mg TThSS. Recheck INR in 4 weeks. Patient reminded to call the Anticoagulation Clinic with any signs or symptoms of bleeding or with any medication changes. Patient given instructions utilizing the teach back method. Discharged ambulatory in no apparent distress. After visit summary printed and reviewed with patient.       Medications reviewed and updated on home medication list Yes    Influenza vaccine:     [] given    [x] declined   [x] received previously   [] plans to receive at a later time   [] refused    [x] documented in EPIC

## 2020-03-16 RX ORDER — WARFARIN SODIUM 5 MG/1
TABLET ORAL
Qty: 270 TABLET | Refills: 3 | Status: SHIPPED | OUTPATIENT
Start: 2020-03-16 | End: 2021-03-09 | Stop reason: SDUPTHER

## 2020-03-26 ENCOUNTER — TELEPHONE (OUTPATIENT)
Dept: PHARMACY | Age: 55
End: 2020-03-26

## 2020-03-26 NOTE — TELEPHONE ENCOUNTER
Spoke with Aimee about the changes in the clinic due to the Covid-19 issue. She is aware that we will not be seeing patient in the clinic and she must go to the [facility] lab on [address] [employer] before 10 am the day of the appointment. We will call her for the appointment after we get the results. Pharmacist will place INR order. Given permission to leave a detailed voicemail if unable to reach the patient.     Belen Montoya PharmD Candidate [address]

## 2020-04-07 ENCOUNTER — HOSPITAL ENCOUNTER (OUTPATIENT)
Dept: PHARMACY | Age: 55
Setting detail: THERAPIES SERIES
Discharge: HOME | End: 2020-04-07
Payer: COMMERCIAL

## 2020-04-07 ENCOUNTER — NURSE ONLY (OUTPATIENT)
Dept: LAB | Age: 55
End: 2020-04-07

## 2020-04-07 LAB — INR BLD: 2.17 (ref 0.85–1.13)

## 2020-04-07 PROCEDURE — 99211 OFF/OP EST MAY X REQ PHY/QHP: CPT

## 2020-05-12 ENCOUNTER — HOSPITAL ENCOUNTER (OUTPATIENT)
Dept: PHARMACY | Age: 55
Setting detail: THERAPIES SERIES
Discharge: HOME | End: 2020-05-12
Payer: COMMERCIAL

## 2020-05-12 ENCOUNTER — NURSE ONLY (OUTPATIENT)
Dept: LAB | Age: 55
End: 2020-05-12

## 2020-05-12 LAB
HCT VFR BLD CALC: 39.3 % (ref 37–47)
HEMOGLOBIN: 12.4 GM/DL (ref 12–16)
INR BLD: 3.07 (ref 0.85–1.13)

## 2020-05-12 PROCEDURE — 99211 OFF/OP EST MAY X REQ PHY/QHP: CPT | Performed by: PHARMACIST

## 2020-05-12 NOTE — PROGRESS NOTES
Consent:  Discussed with patient the Pharmacist Collaborative Practice Agreement. Patient provided verbal and/or electronic (Mekhi Lara) consent to be managed by a pharmacist per collaborative practice agreement between the pharmacist and referring physician. This is in lieu of paper consent due to COVID-19 precautions and the use of remote/virtual visits.      CLINICAL PHARMACY CONSULT: MED RECONCILIATION/REVIEW ADDENDUM    For Pharmacy Admin Tracking Only    PHSO: No  Total # of Interventions Recommended: 1  - Decreased Dose #: 1  - Maintenance Safety Lab Monitoring #: 1  Total Interventions Accepted: 1  Time Spent (min): 130 [facility], [address] [place] [address]

## 2020-06-15 ENCOUNTER — TELEPHONE (OUTPATIENT)
Dept: PHARMACY | Age: 55
End: 2020-06-15

## 2020-06-16 ENCOUNTER — NURSE ONLY (OUTPATIENT)
Dept: LAB | Age: 55
End: 2020-06-16

## 2020-06-16 LAB — INR BLD: 3.68 (ref 0.85–1.13)

## 2020-06-17 ENCOUNTER — HOSPITAL ENCOUNTER (OUTPATIENT)
Dept: PHARMACY | Age: 55
Setting detail: THERAPIES SERIES
Discharge: HOME | End: 2020-06-17
Payer: COMMERCIAL

## 2020-06-17 ENCOUNTER — TELEPHONE (OUTPATIENT)
Dept: PHARMACY | Age: 55
End: 2020-06-17

## 2020-06-17 PROCEDURE — 99211 OFF/OP EST MAY X REQ PHY/QHP: CPT

## 2020-06-17 NOTE — PROGRESS NOTES
Medication Management [address]   [phone] (phone)  [phone] (fax)      Anticoagulation encounter completed via telephone. Patient had lab result completed at outside lab. Ms. Zafar Whatley is a 54 y.o.  female with history of PE. Patient verifies current dosing regimen and tablet strength. Yes  No missed or extra doses. Patient denies s/s bleeding/bruising/swelling/SOB/chest pain  No blood in urine or stool. No dietary changes. Haven't had a lot of the vitamin K heavy foods in the past ~3 weeks. No changes in medication/OTC agents/Herbals. Was diagnosed with diverticulitis a few years ago, ate a protein bar with nuts that made her diverticulitis flair up. Stated she was started Flagyl but this was back on May 16th and she took it for ~4 days but then stopped because she didn't like the way she felt when taking it. No change in alcohol use or tobacco use. No change in activity level. Starting cutting grass once a week and has been doing more walking and just went back to work towards the end of May. Patient denies headaches/dizziness/lightheadedness/falls. No vomiting/diarrhea or acute illness. No Procedures scheduled in the future at this time. Assessment:   Lab Results   Component Value Date    INR 3.68 (H) 06/16/2020    INR 3.07 (H) 05/12/2020    INR 2.17 (H) 04/07/2020     INR supratherapeutic   Recent Labs     06/16/20  1608   INR 3.68*       Plan:  Hold Coumadin today and then decrease Coumadin to 15 mg MWF, 10 mg TThSaSu. Recheck INR in 2 weeks Patient stated she likely won't be able to have INR checked until after work. Plan on her having it drawn 6/30/2020 and calling patient 7/1/20200. Patient reminded to call the Anticoagulation Clinic with any signs or symptoms of bleeding or with any medication changes. Patient given instructions utilizing the teach back method.       The following statement was review with patient

## 2020-06-29 ENCOUNTER — TELEPHONE (OUTPATIENT)
Dept: PHARMACY | Age: 55
End: 2020-06-29

## 2020-06-30 ENCOUNTER — NURSE ONLY (OUTPATIENT)
Dept: LAB | Age: 55
End: 2020-06-30

## 2020-06-30 ENCOUNTER — TELEPHONE (OUTPATIENT)
Dept: PHARMACY | Age: 55
End: 2020-06-30

## 2020-06-30 ENCOUNTER — APPOINTMENT (OUTPATIENT)
Dept: PHARMACY | Age: 55
End: 2020-06-30
Payer: COMMERCIAL

## 2020-06-30 LAB — INR BLD: 2.46 (ref 0.85–1.13)

## 2020-07-01 ENCOUNTER — HOSPITAL ENCOUNTER (OUTPATIENT)
Dept: PHARMACY | Age: 55
Setting detail: THERAPIES SERIES
Discharge: HOME | End: 2020-07-01
Payer: COMMERCIAL

## 2020-07-01 PROCEDURE — 99211 OFF/OP EST MAY X REQ PHY/QHP: CPT

## 2020-07-01 NOTE — PROGRESS NOTES
Medication Management [address]   [phone] (phone)  [phone] (fax)      Anticoagulation encounter completed via telephone. Patient had lab result completed at outside lab. Ms. Ruby Padilla is a 54 y.o.  female with history of PE. Patient verifies current dosing regimen and tablet strength. No missed or extra doses. Patient denies s/s bleeding/bruising/swelling/SOB/chest pain  No blood in urine or stool. No dietary changes. No changes in medication/OTC agents/Herbals. No change in alcohol use or tobacco use. No change in activity level. Patient denies headaches/dizziness/lightheadedness/falls. No vomiting/diarrhea or acute illness. No Procedures scheduled in the future at this time. Assessment:   Lab Results   Component Value Date    INR 2.46 (H) 06/30/2020    INR 3.68 (H) 06/16/2020    INR 3.07 (H) 05/12/2020     INR therapeutic   Recent Labs     06/30/20  1540   INR 2.46*     Patient presents with first therapeutic INR following a recent dose adjustment. Plan:  Continue Coumadin 15 mg MWF and 10 mg TuThSaSu. Recheck INR in 2 weeks. Patient reminded to call the Anticoagulation Clinic with any signs or symptoms of bleeding or with any medication changes. Patient given instructions utilizing the teach back method. Devin Ashley PharmD, BCPS  7/1/2020  8:48 AM    The following statement was review with patient regarding this virtual visit:  We want to confirm that, for purposes of billing, this is a virtual visit with your provider for which we will submit a claim for reimbursement with your insurance company. You may be responsible for any copays, coinsurance amounts or other amounts not covered by your insurance company. If you do not accept this, unfortunately we will not be able to schedule a virtual visit with the provider. Do you accept?   Yes    CLINICAL PHARMACY CONSULT: MED RECONCILIATION/REVIEW ADDENDUM    For Pharmacy Admin Tracking Only    PHSO: No  Total # of Interventions Recommended: 0  - Maintenance Safety Lab Monitoring #: 1  Total Interventions Accepted: 0  Time Spent (min): [address], PharmD

## 2020-07-06 ENCOUNTER — OFFICE VISIT (OUTPATIENT)
Dept: CARDIOLOGY CLINIC | Age: 55
End: 2020-07-06
Payer: COMMERCIAL

## 2020-07-06 VITALS
DIASTOLIC BLOOD PRESSURE: 88 MMHG | WEIGHT: 231.2 LBS | HEIGHT: 64 IN | HEART RATE: 81 BPM | SYSTOLIC BLOOD PRESSURE: 138 MMHG | BODY MASS INDEX: 39.47 KG/M2

## 2020-07-06 PROBLEM — I10 ESSENTIAL HYPERTENSION: Status: RESOLVED | Noted: 2017-05-05 | Resolved: 2020-07-06

## 2020-07-06 PROBLEM — E78.5 DYSLIPIDEMIA: Status: ACTIVE | Noted: 2020-07-06

## 2020-07-06 PROCEDURE — 93000 ELECTROCARDIOGRAM COMPLETE: CPT | Performed by: INTERNAL MEDICINE

## 2020-07-06 PROCEDURE — 99214 OFFICE O/P EST MOD 30 MIN: CPT | Performed by: INTERNAL MEDICINE

## 2020-07-06 NOTE — PROGRESS NOTES
Chief Complaint   Patient presents with    1 Year Follow Up   . Originally Was bath room felt CP and later she found herself on the floor and brought to the hospital and found to have PE and troponin elevation  Was seen by Dr. Hickey Friend in the hospital          1 year F/u    EKG done today. cpap for TERESA used once in a while    Denies chest pain, sob, dizziness, edema, and palpitations    Had Hx of syncope sept 2013 while getting off toilate and Dx with PE    Dec 2009 had PE and sept 2013- PE      Patient Active Problem List   Diagnosis    Hx of Recurrent  pulmonary embolism dec 2009 and 09/2013- need to cont life long coumadin    hx of Syncope and collapse due to, PE    Obesity    Abnormal echocardiogram EF 55%, RVE and RV function reduced post PE    SOB (shortness of breath) on exertion- improving    Pulmonary embolus (HCC)    Deep vein thrombosis (HCC)    Chisholm filter in place    Anticoagulated on Coumadin    Sinus bradycardia -  and pauses only in the night range from 2.1 to 3.7 sec pause-pat Dxed with TERESA 2013 and cpap- Holter monitor after CPAP got better    TERESA (obstructive sleep apnea)    Plantar fasciitis, right    Asthma    Type 2 diabetes mellitus (HCC)    Abnormal EKG    Diverticulitis of large intestine    Dyslipidemia       Past Surgical History:   Procedure Laterality Date    ANKLE SURGERY      Left Ankle.  plate    DILATION AND CURETTAGE OF UTERUS N/A 8/15/2019    HYSTEROSCOPY DILATATION AND CURETTAGE performed by Samira Méndez DO at [address] [facility]      right wrist for tendonitis    TOE SURGERY  06/25/2019    LEFT SECOND TOE    VENA CAVA FILTER PLACEMENT         No Known Allergies     Family History   Problem Relation Age of Onset    Cancer Mother         breast    High Blood Pressure Sister     High Cholesterol Sister     Diabetes Brother     Stroke Maternal Grandmother         Social History     Socioeconomic History    Marital status: Single     Spouse name: Not on file    Number of children: Not on file    Years of education: Not on file    Highest education level: Not on file   Occupational History    Not on file   Social Needs    Financial resource strain: Not on file    Food insecurity     Worry: Not on file     Inability: Not on file    Transportation needs     Medical: Not on file     Non-medical: Not on file   Tobacco Use    Smoking status: Never Smoker    Smokeless tobacco: Never Used   Substance and Sexual Activity    Alcohol use: Yes     Comment: Ocassionally    Drug use: No    Sexual activity: Not Currently     Partners: Male   Lifestyle    Physical activity     Days per week: Not on file     Minutes per session: Not on file    Stress: Not on file   Relationships    Social connections     Talks on phone: Not on file     Gets together: Not on file     Attends Bahai service: Not on file     Active member of club or organization: Not on file     Attends meetings of clubs or organizations: Not on file     Relationship status: Not on file    Intimate partner violence     Fear of current or ex partner: Not on file     Emotionally abused: Not on file     Physically abused: Not on file     Forced sexual activity: Not on file   Other Topics Concern    Not on file   Social History Narrative    Not on file       Current Outpatient Medications   Medication Sig Dispense Refill    warfarin (COUMADIN) 5 MG tablet Take as directed by [facility].  #270 Tablets = 90 day supply 270 tablet 3    triamcinolone (NASACORT) 55 MCG/ACT nasal inhaler       montelukast (SINGULAIR) 10 MG tablet Take 10 mg by mouth daily      alendronate (FOSAMAX) 70 MG tablet Take 70 mg by mouth every 28 days       metFORMIN (GLUCOPHAGE) 500 MG tablet take 1 tablet by mouth three times a day with food  0    lisinopril (PRINIVIL;ZESTRIL) 5 MG tablet Take 5 mg by mouth daily Indications: Diabetes, Raised Blood Pressure      atorvastatin Value Date    WBC 5.0 06/18/2019    RBC 4.34 06/18/2019    HGB 12.4 05/12/2020    HCT 39.3 05/12/2020    MCV 90.1 01/22/2019    MCH 29.1 01/22/2019    MCHC 32.3 01/22/2019    RDW 15.0 09/18/2013     06/18/2019    MPV 10.8 01/22/2019     BMP:    Lab Results   Component Value Date     06/18/2019    K 3.9 06/18/2019     06/18/2019    CO2 26 06/18/2019    BUN 13 06/18/2019    LABALBU 4.3 01/22/2019    CREATININE 0.77 06/18/2019    CALCIUM 9.1 06/18/2019    LABGLOM >60 06/18/2019    LABGLOM 79 01/22/2019    GLUCOSE 83 06/18/2019    GLUCOSE 96 09/12/2017     Hepatic Function Panel:    Lab Results   Component Value Date    ALKPHOS 89 01/22/2019    ALT 20 01/22/2019    AST 19 01/22/2019    PROT 7.0 01/22/2019    PROT 7.6 12/29/2016    BILITOT 0.3 01/22/2019    BILIDIR <0.2 01/22/2019    IBILI 0.6 12/29/2016    LABALBU 4.3 01/22/2019     Magnesium:    No results found for: MG  Warfarin PT/INR:    No components found for: PTPATWAR,  PTINRWAR  HgBA1c:    Lab Results   Component Value Date    LABA1C 6.1 09/10/2013     FLP:    Lab Results   Component Value Date    TRIG 46 09/12/2017    HDL 53 09/12/2017    LDLCALC 174 12/29/2016    LDLDIRECT 81 09/12/2017     TSH:    Lab Results   Component Value Date    TSH 0.785 05/30/2013     Echo  SUMMARY:    Left ventricle: There is moderate hypokinesis of the entire septum. Size was normal.  Systolic function was at the lower limits of normal by Teichholz. Ejection  fraction was estimated in the range of 50 % to 55 %. This study was inadequate for the evaluation of regional wall motion. Wall thickness was at the upper limits of normal.  Doppler parameters were consistent with abnormal left ventricular relaxation  (grade 1 diastolic dysfunction). Right ventricle: The ventricle was mildly to moderately dilated. Systolic function was moderately to markedly reduced. Right atrium:  The atrium was mildly dilated. Tricuspid valve:   There was mild regurgitation. Inferior vena cava, hepatic veins: The respirophasic change in diameter was less than 50%. Prepared and signed by    Alina Gracia MD    EKG   Sinus bradycardia  Otherwise normal ECG  When compared with ECG of 10-SEP-2013 07:00,  Ventricular rate has decreased BY 37 BPM  ST no longer elevated in Anterior leads  T wave inversion less evident in Anterior leads  QT has shortened  Confirmed by Bhumi Melendez MD, Cristhian Carnes ([id number])    Nuc stress Okay    EKG 8/14/14-Sinus  Rhythm   -RSR(V1) -nondiagnostic. PROBABLY NORMAL    2/12/14-ekg-Sinus  Bradycardia   -RSR(V1) -nondiagnostic. PROBABLY NORMAL      CONCLUSION:  1. This is a normal sinus rhythm with average heart rate of 76 beats  per minute ranging from  beats per minute. 2. No significant pause of more than 1.4 seconds noted. 3. Rare ventricular ectopic beats, all isolated. Rare  supraventricular ectopic beats, isolated and couplets. 4. No other form of arrhythmia noted. No significant bradyarrhythmia  noted as well.          Aimee Ramon M.D.  Mickeal Gowers  D: 12/31/2015 17:44     EKG 10/13/16  Sinus  Rhythm   -Incomplete right bundle branch block. ABNORMAL     10/16/18  Sinus  Rhythm  -With rate variation   cv = 11.  -Incomplete right bundle branch block. ABNORMAL    EKG 6/13/19   Sinus  Rhythm   -Incomplete right bundle branch block. ABNORMAL     ekg 7/6/2020  Sinus  Rhythm   -Incomplete right bundle branch block. ABNORMAL     Assessment     Diagnosis Orders   1. Sinus bradycardia -  and pauses only in the night range from 2.1 to 3.7 sec pause-pat Dxed with TERESA 2013 and cpap- Holter monitor after CPAP got better     2. Dyslipidemia     3. SOB (shortness of breath) on exertion  EKG 12 Lead   4. Abnormal echocardiogram EF 55%, RVE and RV function reduced post PE     5. Abnormal EKG         Past admission Dx  IMPRESSION:   1. Bilateral pulmonary emboli - recurrent.    2. Incomplete right bundle branch block and EKG abnormalities

## 2020-07-08 ENCOUNTER — HOSPITAL ENCOUNTER (OUTPATIENT)
Age: 55
Setting detail: SPECIMEN
Discharge: HOME | End: 2020-07-08
Payer: COMMERCIAL

## 2020-07-08 LAB
ALBUMIN SERPL-MCNC: 4.1 G/DL (ref 3.5–5.2)
ALBUMIN/GLOBULIN RATIO: 1.4 (ref 1–2.5)
ALP BLD-CCNC: 89 U/L (ref 35–104)
ALT SERPL-CCNC: 21 U/L (ref 5–33)
ANION GAP SERPL CALCULATED.3IONS-SCNC: 14 MMOL/L (ref 9–17)
AST SERPL-CCNC: 19 U/L
BILIRUB SERPL-MCNC: 0.49 MG/DL (ref 0.3–1.2)
BUN BLDV-MCNC: 15 MG/DL (ref 6–20)
BUN/CREAT BLD: NORMAL (ref 9–20)
CALCIUM SERPL-MCNC: 9 MG/DL (ref 8.6–10.4)
CHLORIDE BLD-SCNC: 105 MMOL/L (ref 98–107)
CHOLESTEROL/HDL RATIO: 2.2
CHOLESTEROL: 105 MG/DL
CO2: 23 MMOL/L (ref 20–31)
CREAT SERPL-MCNC: 0.81 MG/DL (ref 0.5–0.9)
GFR AFRICAN AMERICAN: >60 ML/MIN
GFR NON-AFRICAN AMERICAN: >60 ML/MIN
GFR SERPL CREATININE-BSD FRML MDRD: NORMAL ML/MIN/{1.73_M2}
GFR SERPL CREATININE-BSD FRML MDRD: NORMAL ML/MIN/{1.73_M2}
GLUCOSE BLD-MCNC: 98 MG/DL (ref 70–99)
HCT VFR BLD CALC: 37.8 % (ref 36.3–47.1)
HDLC SERPL-MCNC: 47 MG/DL
HEMOGLOBIN: 12.1 G/DL (ref 11.9–15.1)
LDL CHOLESTEROL: 35 MG/DL (ref 0–130)
MCH RBC QN AUTO: 28.7 PG (ref 25.2–33.5)
MCHC RBC AUTO-ENTMCNC: 32 G/DL (ref 28.4–34.8)
MCV RBC AUTO: 89.8 FL (ref 82.6–102.9)
NRBC AUTOMATED: 0 PER 100 WBC
PDW BLD-RTO: 15.6 % (ref 11.8–14.4)
PLATELET # BLD: 314 K/UL (ref 138–453)
PMV BLD AUTO: 11.7 FL (ref 8.1–13.5)
POTASSIUM SERPL-SCNC: 4.3 MMOL/L (ref 3.7–5.3)
RBC # BLD: 4.21 M/UL (ref 3.95–5.11)
SODIUM BLD-SCNC: 142 MMOL/L (ref 135–144)
TOTAL PROTEIN: 7 G/DL (ref 6.4–8.3)
TRIGL SERPL-MCNC: 116 MG/DL
TSH SERPL DL<=0.05 MIU/L-ACNC: 1.65 MIU/L (ref 0.3–5)
VLDLC SERPL CALC-MCNC: NORMAL MG/DL (ref 1–30)
WBC # BLD: 5.8 K/UL (ref 3.5–11.3)

## 2020-07-20 ENCOUNTER — NURSE ONLY (OUTPATIENT)
Dept: LAB | Age: 55
End: 2020-07-20

## 2020-07-20 LAB — INR BLD: 1.81 (ref 0.85–1.13)

## 2020-07-21 ENCOUNTER — HOSPITAL ENCOUNTER (OUTPATIENT)
Dept: PHARMACY | Age: 55
Setting detail: THERAPIES SERIES
Discharge: HOME | End: 2020-07-21
Payer: COMMERCIAL

## 2020-07-21 PROCEDURE — 99211 OFF/OP EST MAY X REQ PHY/QHP: CPT

## 2020-07-21 NOTE — PROGRESS NOTES
Medication Management [address]  [phone] (phone)  [phone] (fax)      Anticoagulation encounter completed via telephone. Patient had lab result completed at outside lab. Ms. Alex Thompson is a 54 y.o.  female with history of PE. Patient verifies current dosing regimen and tablet strength. No missed or extra doses. Patient denies s/s bleeding/swelling/SOB/chest pain. Endorses a bruise after fall earlier this month  No blood in urine or stool. No dietary changes. No changes in medication/OTC agents/Herbals. Has started taking a spoonful of sea richardson daily since 7/4 (neglible amount of vit K)  No change in alcohol use or tobacco use. No change in activity level. Patient denies headaches/dizziness/lightheadedness. Endorses a fall ~ 7/4; did not hit head  No vomiting/diarrhea or acute illness. No Procedures scheduled in the future at this time. Assessment:   Lab Results   Component Value Date    INR 1.81 (H) 07/20/2020    INR 2.46 (H) 06/30/2020    INR 3.68 (H) 06/16/2020     INR subtherapeutic   Recent Labs     07/20/20  1626   INR 1.81*     INR is subtherapeutic; it is unclear if value is due to recent dose reduction (6/16) or an unknown factor. Plan:  Provide boosted dose of Coumadin 15mg today, 7/21, then continue Coumadin 15mg MoWeFr and 10mg SuTuThSa. Recheck INR in 10 days. Patient reminded to call the Anticoagulation Clinic with any signs or symptoms of bleeding or with any medication changes. Patient given instructions utilizing the teach back method. The following statement was review with patient regarding this virtual visit:  We want to confirm that, for purposes of billing, this is a virtual visit with your provider for which we will submit a claim for reimbursement with your insurance company. You may be responsible for any copays, coinsurance amounts or other amounts not covered by your insurance company.  If you do not accept this, unfortunately we will not be able to schedule a virtual visit with the provider. Do you accept?   Yes    CLINICAL PHARMACY CONSULT: MED RECONCILIATION/REVIEW ADDENDUM    For Pharmacy Admin Tracking Only    PHSO: No  Total # of Interventions Recommended: 1  - Increased Dose #: 1  - Maintenance Safety Lab Monitoring #: 1  Total Interventions Accepted: 1  Time Spent (min): [address], PharmD  [address]

## 2020-07-30 ENCOUNTER — HOSPITAL ENCOUNTER (OUTPATIENT)
Dept: PHARMACY | Age: 55
Setting detail: THERAPIES SERIES
Discharge: HOME | End: 2020-07-30
Payer: COMMERCIAL

## 2020-07-30 VITALS — TEMPERATURE: 97.6 F

## 2020-07-30 LAB — POC INR: 2.3 (ref 0.8–1.2)

## 2020-07-30 PROCEDURE — 99211 OFF/OP EST MAY X REQ PHY/QHP: CPT

## 2020-07-30 PROCEDURE — 85610 PROTHROMBIN TIME: CPT

## 2020-07-30 PROCEDURE — 36416 COLLJ CAPILLARY BLOOD SPEC: CPT

## 2020-07-30 NOTE — PROGRESS NOTES
Medication Management [address]  [phone] (phone)  [phone] (fax)      Ms. Jyoti Segal is a 54 y.o.  female with history of PE, per Dr. Suzanna Mendez referral, who presents today for Warfarin monitoring and adjustment (10 day visit). Patient verifies current dosing regimen and tablet strength. No missed or extra doses, except as ordered last visit. Patient denies bleeding/SOB/chest pain. Has usual easy bruising. Has usual swelling of ankles (both were broken), plus swelling of feet after long day at work. No blood in urine or stool. Dietary changes: had less cabbage, but plans to resume usual amount. No changes in medication/OTC agents/herbals. No change in alcohol use or tobacco use. No change in activity level. Patient denies dizziness/lightheadedness/falls. Takes 1-2 Tylenol for usual headaches. No vomiting/diarrhea or acute illness. No procedures scheduled in the future at this time. Assessment:   Lab Results   Component Value Date    INR 2.30 (H) 07/30/2020    INR 1.81 (H) 07/20/2020    INR 2.46 (H) 06/30/2020     INR therapeutic - goal 2-3. Recent Labs     07/30/20  1549   INR 2.30*       Plan:  POCT INR ordered/performed/result reviewed. Continue PO Coumadin 15 mg MWF, 10 mg TThSS. Recheck INR in 2 weeks. (Report given - orders entered by SUSIE Bermudez., PharmD.)  Patient reminded to call the Anticoagulation Clinic with any signs or symptoms of bleeding or with any medication changes. Patient given instructions utilizing the teach back method. Discharged ambulatory in no apparent distress, wearing mask. After visit summary printed and reviewed with patient.       Medications reviewed and updated on home medication list.    Influenza vaccine:     [] given    [] declined   [] received previously   [] plans to receive at a later time   [] refused    [] documented in EPIC

## 2020-08-13 ENCOUNTER — HOSPITAL ENCOUNTER (OUTPATIENT)
Dept: PHARMACY | Age: 55
Setting detail: THERAPIES SERIES
Discharge: HOME | End: 2020-08-13
Payer: COMMERCIAL

## 2020-08-13 VITALS — TEMPERATURE: 97.7 F

## 2020-08-13 LAB — POC INR: 2.6 (ref 0.8–1.2)

## 2020-08-13 PROCEDURE — 36416 COLLJ CAPILLARY BLOOD SPEC: CPT

## 2020-08-13 PROCEDURE — 85610 PROTHROMBIN TIME: CPT

## 2020-08-13 PROCEDURE — 99211 OFF/OP EST MAY X REQ PHY/QHP: CPT

## 2020-08-13 NOTE — PROGRESS NOTES
Medication Management [address]  [phone] (phone)  [phone] (fax)      Ms. Larisa Archer is a 54 y.o.  female with history of PE who presents today for anticoagulation monitoring and adjustment. Patient verifies current dosing regimen and tablet strength. No missed or extra doses. Patient denies s/s bleeding/bruising/swelling/SOB/chest pain  No blood in urine or stool. No dietary changes. No changes in medication/OTC agents/Herbals. No change in alcohol use or tobacco use. No change in activity level. Patient denies headaches/dizziness/lightheadedness/falls. No vomiting/diarrhea or acute illness. No Procedures scheduled in the future at this time. Assessment:   Lab Results   Component Value Date    INR 2.60 (H) 08/13/2020    INR 2.30 (H) 07/30/2020    INR 1.81 (H) 07/20/2020     INR therapeutic   Recent Labs     08/13/20  1605   INR 2.60*         Plan:  Continue Coumadin 15 mg MWF and 10 mg TThSaSu. Recheck INR in 3 week(s). Patient reminded to call the Anticoagulation Clinic with any signs or symptoms of bleeding or with any medication changes. Patient given instructions utilizing the teach back method. Discharged ambulatory in no apparent distress. After visit summary printed and reviewed with patient.       Medications reviewed and updated on home medication list Yes    CLINICAL PHARMACY CONSULT: MED RECONCILIATION/REVIEW Meaghan  22. Tracking Only    PHSO: No  Total # of Interventions Recommended: 1  - Maintenance Safety Lab Monitoring #: 1  Total Interventions Accepted: 1  Time Spent (min): 20    David Barker PharmD  8/13/2020  4:24 PM

## 2020-09-03 ENCOUNTER — HOSPITAL ENCOUNTER (OUTPATIENT)
Dept: PHARMACY | Age: 55
Setting detail: THERAPIES SERIES
Discharge: HOME | End: 2020-09-03
Payer: COMMERCIAL

## 2020-09-03 VITALS — TEMPERATURE: 97.9 F

## 2020-09-03 PROBLEM — M50.30 DEGENERATION OF CERVICAL INTERVERTEBRAL DISC: Status: ACTIVE | Noted: 2018-11-05

## 2020-09-03 LAB — POC INR: 2.7 (ref 0.8–1.2)

## 2020-09-03 PROCEDURE — 85610 PROTHROMBIN TIME: CPT

## 2020-09-03 PROCEDURE — 36416 COLLJ CAPILLARY BLOOD SPEC: CPT

## 2020-09-03 PROCEDURE — 99211 OFF/OP EST MAY X REQ PHY/QHP: CPT

## 2020-09-03 NOTE — PROGRESS NOTES
Medication Management [address]  [phone] (phone)  [phone] (fax)      Ms. Augustin Montaño is a 54 y.o.  female with history of PE, per Dr. Myriam Sanders referral, who presents today for Warfarin monitoring and adjustment (3 week visit). Patient verifies current dosing regimen and tablet strength. No missed or extra doses. Patient denies  bleeding/bruising/swelling/SOB/chest pain. No blood in urine or stool. About 2 weeks ago, drank Belly Snatch Juice for 10 days (lost 3 pounds). Hopes to get more; advised against it (she had ingredients on phone - green coffee bean extract, garcinia cambogia, yachon root, etc.). No changes in medication/OTC agents/herbals. No change in alcohol use or tobacco use. No change in activity level. Patient denies headaches/dizziness/lightheadedness/falls. No vomiting/diarrhea or acute illness. No procedures scheduled in the future at this time. Assessment:   Lab Results   Component Value Date    INR 2.70 (H) 09/03/2020    INR 2.60 (H) 08/13/2020    INR 2.30 (H) 07/30/2020     INR therapeutic - goal 2-3. Recent Labs     09/03/20  1552   INR 2.70*     Plan:  POCT INR ordered/performed/result reviewed. Continue PO Coumadin 15 mg MWF, 10 mg TThSS. Recheck INR in 4 week(s). Patient reminded to call the Anticoagulation Clinic with any signs or symptoms of bleeding or with any medication changes. Patient given instructions utilizing the teach back method. Discharged ambulatory in no apparent distress, wearing mask. After visit summary printed and reviewed with patient.       Medications reviewed and updated on home medication list.    Influenza vaccine:     [] given    [] declined   [] received previously   [] plans to receive at a later time   [] refused    [] documented in EPIC

## 2020-10-01 ENCOUNTER — HOSPITAL ENCOUNTER (OUTPATIENT)
Dept: PHARMACY | Age: 55
Setting detail: THERAPIES SERIES
Discharge: HOME | End: 2020-10-01
Payer: COMMERCIAL

## 2020-10-01 VITALS — TEMPERATURE: 98.1 F

## 2020-10-01 LAB — POC INR: 1.9 (ref 0.8–1.2)

## 2020-10-01 PROCEDURE — 36416 COLLJ CAPILLARY BLOOD SPEC: CPT | Performed by: PHARMACIST

## 2020-10-01 PROCEDURE — 99211 OFF/OP EST MAY X REQ PHY/QHP: CPT | Performed by: PHARMACIST

## 2020-10-01 PROCEDURE — 90686 IIV4 VACC NO PRSV 0.5 ML IM: CPT | Performed by: INTERNAL MEDICINE

## 2020-10-01 PROCEDURE — G0008 ADMIN INFLUENZA VIRUS VAC: HCPCS | Performed by: INTERNAL MEDICINE

## 2020-10-01 PROCEDURE — 6360000002 HC RX W HCPCS: Performed by: INTERNAL MEDICINE

## 2020-10-01 PROCEDURE — 85610 PROTHROMBIN TIME: CPT | Performed by: PHARMACIST

## 2020-10-01 RX ADMIN — INFLUENZA A VIRUS A/VICTORIA/2454/2019 IVR-207 (H1N1) ANTIGEN (PROPIOLACTONE INACTIVATED), INFLUENZA A VIRUS A/HONG KONG/2671/2019 IVR-208 (H3N2) ANTIGEN (PROPIOLACTONE INACTIVATED), INFLUENZA B VIRUS B/VICTORIA/705/2018 BVR-11 ANTIGEN (PROPIOLACTONE INACTIVATED), INFLUENZA B VIRUS B/PHUKET/3073/2013 BVR-1B ANTIGEN (PROPIOLACTONE INACTIVATED) 0.5 ML: 15; 15; 15; 15 INJECTION, SUSPENSION INTRAMUSCULAR at 16:30

## 2020-10-28 ENCOUNTER — HOSPITAL ENCOUNTER (OUTPATIENT)
Dept: PHARMACY | Age: 55
Setting detail: THERAPIES SERIES
Discharge: HOME | End: 2020-10-28
Payer: COMMERCIAL

## 2020-10-28 VITALS — TEMPERATURE: 97.4 F

## 2020-10-28 LAB — POC INR: 2.7 (ref 0.8–1.2)

## 2020-10-28 PROCEDURE — 36416 COLLJ CAPILLARY BLOOD SPEC: CPT

## 2020-10-28 PROCEDURE — 99211 OFF/OP EST MAY X REQ PHY/QHP: CPT

## 2020-10-28 PROCEDURE — 85610 PROTHROMBIN TIME: CPT

## 2020-11-12 ENCOUNTER — APPOINTMENT (OUTPATIENT)
Dept: GENERAL RADIOLOGY | Age: 55
End: 2020-11-12
Payer: COMMERCIAL

## 2020-11-12 ENCOUNTER — HOSPITAL ENCOUNTER (EMERGENCY)
Age: 55
Discharge: HOME | End: 2020-11-13
Payer: COMMERCIAL

## 2020-11-12 LAB
BASOPHILS # BLD: 0.2 %
BASOPHILS ABSOLUTE: 0 THOU/MM3 (ref 0–0.1)
EKG ATRIAL RATE: 78 BPM
EKG P AXIS: 51 DEGREES
EKG P-R INTERVAL: 138 MS
EKG Q-T INTERVAL: 382 MS
EKG QRS DURATION: 92 MS
EKG QTC CALCULATION (BAZETT): 435 MS
EKG R AXIS: 24 DEGREES
EKG T AXIS: 36 DEGREES
EKG VENTRICULAR RATE: 78 BPM
EOSINOPHIL # BLD: 0.9 %
EOSINOPHILS ABSOLUTE: 0 THOU/MM3 (ref 0–0.4)
ERYTHROCYTE [DISTWIDTH] IN BLOOD BY AUTOMATED COUNT: 15.9 % (ref 11.5–14.5)
ERYTHROCYTE [DISTWIDTH] IN BLOOD BY AUTOMATED COUNT: 51.6 FL (ref 35–45)
HCT VFR BLD CALC: 40.6 % (ref 37–47)
HEMOGLOBIN: 13.4 GM/DL (ref 12–16)
IMMATURE GRANS (ABS): 0.01 THOU/MM3 (ref 0–0.07)
IMMATURE GRANULOCYTES: 0.2 %
INR BLD: 3.18 (ref 0.85–1.13)
LYMPHOCYTES # BLD: 53.2 %
LYMPHOCYTES ABSOLUTE: 2.8 THOU/MM3 (ref 1–4.8)
MCH RBC QN AUTO: 29.3 PG (ref 26–33)
MCHC RBC AUTO-ENTMCNC: 33 GM/DL (ref 32.2–35.5)
MCV RBC AUTO: 88.6 FL (ref 81–99)
MONOCYTES # BLD: 7.9 %
MONOCYTES ABSOLUTE: 0.4 THOU/MM3 (ref 0.4–1.3)
NUCLEATED RED BLOOD CELLS: 0 /100 WBC
PLATELET # BLD: 320 THOU/MM3 (ref 130–400)
PMV BLD AUTO: 10.6 FL (ref 9.4–12.4)
RBC # BLD: 4.58 MILL/MM3 (ref 4.2–5.4)
SEG NEUTROPHILS: 37.6 %
SEGMENTED NEUTROPHILS ABSOLUTE COUNT: 2 THOU/MM3 (ref 1.8–7.7)
WBC # BLD: 5.3 THOU/MM3 (ref 4.8–10.8)

## 2020-11-12 PROCEDURE — 83880 ASSAY OF NATRIURETIC PEPTIDE: CPT

## 2020-11-12 PROCEDURE — 85025 COMPLETE CBC W/AUTO DIFF WBC: CPT

## 2020-11-12 PROCEDURE — 96374 THER/PROPH/DIAG INJ IV PUSH: CPT

## 2020-11-12 PROCEDURE — 80053 COMPREHEN METABOLIC PANEL: CPT

## 2020-11-12 PROCEDURE — 99283 EMERGENCY DEPT VISIT LOW MDM: CPT

## 2020-11-12 PROCEDURE — 93005 ELECTROCARDIOGRAM TRACING: CPT | Performed by: EMERGENCY MEDICINE

## 2020-11-12 PROCEDURE — 85730 THROMBOPLASTIN TIME PARTIAL: CPT

## 2020-11-12 PROCEDURE — 84145 PROCALCITONIN (PCT): CPT

## 2020-11-12 PROCEDURE — 36415 COLL VENOUS BLD VENIPUNCTURE: CPT

## 2020-11-12 PROCEDURE — 85610 PROTHROMBIN TIME: CPT

## 2020-11-12 PROCEDURE — 71045 X-RAY EXAM CHEST 1 VIEW: CPT

## 2020-11-13 VITALS
RESPIRATION RATE: 15 BRPM | BODY MASS INDEX: 37.56 KG/M2 | WEIGHT: 220 LBS | HEART RATE: 79 BPM | DIASTOLIC BLOOD PRESSURE: 88 MMHG | OXYGEN SATURATION: 96 % | SYSTOLIC BLOOD PRESSURE: 114 MMHG | HEIGHT: 64 IN | TEMPERATURE: 98.1 F

## 2020-11-13 LAB
ALBUMIN SERPL-MCNC: 4.6 G/DL (ref 3.5–5.1)
ALP BLD-CCNC: 117 U/L (ref 38–126)
ALT SERPL-CCNC: 31 U/L (ref 11–66)
ANION GAP SERPL CALCULATED.3IONS-SCNC: 14 MEQ/L (ref 8–16)
APTT: 43.7 SECONDS (ref 22–38)
AST SERPL-CCNC: 23 U/L (ref 5–40)
BILIRUB SERPL-MCNC: 0.2 MG/DL (ref 0.3–1.2)
BUN BLDV-MCNC: 18 MG/DL (ref 7–22)
CALCIUM SERPL-MCNC: 9.2 MG/DL (ref 8.5–10.5)
CHLORIDE BLD-SCNC: 103 MEQ/L (ref 98–111)
CO2: 23 MEQ/L (ref 23–33)
CREAT SERPL-MCNC: 0.8 MG/DL (ref 0.4–1.2)
GFR SERPL CREATININE-BSD FRML MDRD: 90 ML/MIN/1.73M2
GLUCOSE BLD-MCNC: 112 MG/DL (ref 70–108)
OSMOLALITY CALCULATION: 282.1 MOSMOL/KG (ref 275–300)
POTASSIUM REFLEX MAGNESIUM: 4 MEQ/L (ref 3.5–5.2)
PRO-BNP: 15.4 PG/ML (ref 0–900)
PROCALCITONIN: 0.05 NG/ML (ref 0.01–0.09)
SODIUM BLD-SCNC: 140 MEQ/L (ref 135–145)
TOTAL PROTEIN: 7.5 G/DL (ref 6.1–8)

## 2020-11-13 PROCEDURE — 93010 ELECTROCARDIOGRAM REPORT: CPT | Performed by: NUCLEAR MEDICINE

## 2020-11-13 PROCEDURE — 6360000002 HC RX W HCPCS: Performed by: EMERGENCY MEDICINE

## 2020-11-13 RX ORDER — DEXAMETHASONE SODIUM PHOSPHATE 4 MG/ML
6 INJECTION, SOLUTION INTRA-ARTICULAR; INTRALESIONAL; INTRAMUSCULAR; INTRAVENOUS; SOFT TISSUE ONCE
Status: COMPLETED | OUTPATIENT
Start: 2020-11-13 | End: 2020-11-13

## 2020-11-13 RX ORDER — DEXAMETHASONE 6 MG/1
6 TABLET ORAL 2 TIMES DAILY WITH MEALS
Qty: 10 TABLET | Refills: 0 | Status: SHIPPED | OUTPATIENT
Start: 2020-11-13 | End: 2020-11-18

## 2020-11-13 RX ADMIN — DEXAMETHASONE SODIUM PHOSPHATE 6 MG: 4 INJECTION, SOLUTION INTRA-ARTICULAR; INTRALESIONAL; INTRAMUSCULAR; INTRAVENOUS; SOFT TISSUE at 01:47

## 2020-11-13 ASSESSMENT — ENCOUNTER SYMPTOMS
BACK PAIN: 0
NAUSEA: 0
ABDOMINAL DISTENTION: 0
VOMITING: 0
COLOR CHANGE: 0
SHORTNESS OF BREATH: 1
COUGH: 1

## 2020-11-13 NOTE — ED PROVIDER NOTES
[facility] [address] COMPLAINT       Chief Complaint   Patient presents with    Other     COVID positive    Shortness of Breath       Nurses Notes reviewed and I agree except as noted in the HPI. HISTORY OF PRESENT ILLNESS    Celsa Crwoe is a 54 y.o. female who presents to the Emergency Department for the evaluation of shortness of breath. The patient states that she has had several different episodes of a having a hard time catching her breath. Patient is positive for COVID-19. This is day 11 of her symptoms. Patient states that she has had general body aches and some mild dyspnea for the past several days but has markedly increased in the past 24 hours. Patient has a history of DVT/PE and is anticoagulated with Coumadin. Patient denies any fever. She does have occasional cough. She does have occasional wheeze. No sputum production. Patient does have mild intermittent chest pain. The HPI was provided by the patient. REVIEW OF SYSTEMS     Review of Systems   Constitutional: Positive for activity change and fatigue. Negative for appetite change and fever. Respiratory: Positive for cough and shortness of breath. Cardiovascular: Positive for chest pain. Negative for leg swelling. Gastrointestinal: Negative for abdominal distention, nausea and vomiting. Musculoskeletal: Negative for back pain. Skin: Negative for color change. Neurological: Negative for dizziness, weakness and light-headedness. Psychiatric/Behavioral: Negative for agitation. PAST MEDICAL HISTORY    has a past medical history of Asthma, Depression, Diverticulitis, History of deep vein thrombophlebitis of lower extremity, Osteoarthritis, Osteopenia, PE (pulmonary embolism), Plantar fasciitis, right, Sleep apnea, and Type 2 diabetes mellitus without complication ([address]     SURGICAL HISTORY      has a past surgical history that includes Vena Cava Filter Placement; Ankle surgery; other surgical history; Toe Surgery (2019); and Dilation and curettage of uterus (N/A, 8/15/2019). CURRENT MEDICATIONS       Discharge Medication List as of 2020  1:42 AM      CONTINUE these medications which have NOT CHANGED    Details   warfarin (COUMADIN) 5 MG tablet Take as directed by [address] [place] [address] [facility] Tablets = 90 day supply, Disp-270 tablet, R-3Please discard previous script that was sentNormal      triamcinolone (NASACORT) 55 MCG/ACT nasal inhaler 1 spray by Nasal route daily In each nostrilHistorical Med      montelukast (SINGULAIR) 10 MG tablet Take 10 mg by mouth dailyHistorical Med      alendronate (FOSAMAX) 70 MG tablet Take 70 mg by mouth every 28 days Historical Med      metFORMIN (GLUCOPHAGE) 500 MG tablet 2 times daily , R-0Historical Med      lisinopril (PRINIVIL;ZESTRIL) 5 MG tablet Take 5 mg by mouth daily Indications: Diabetes, Raised Blood PressureHistorical Med      atorvastatin (LIPITOR) 20 MG tablet Take 20 mg by mouth daily Indications: Blood Cholesterol Abnormal      Albuterol Sulfate (VENTOLIN HFA IN) Inhale 2 puffs into the lungs every 4 hours as needed Indications: Asthma      Acetaminophen (TYLENOL PO) Take 1,000 mg by mouth as needed Don't take more than 3,000 mg per day. Historical Med      Calcium Carbonate (CALTRATE 600 PO) Take 2 tablets by mouth daily supplement             ALLERGIES     has No Known Allergies. FAMILY HISTORY     She indicated that her mother is . She indicated that her father is . She indicated that her sister is alive. She indicated that her brother is . She indicated that the status of her maternal grandmother is unknown.   family history includes Cancer in her mother; Diabetes in her brother; High Blood Pressure in her sister; High Cholesterol in her sister; Stroke in her maternal grandmother. SOCIAL HISTORY      reports that she has never smoked.  She has never used smokeless tobacco. She reports current alcohol use. She reports that she does not use drugs. PHYSICAL EXAM     INITIAL VITALS:  height is 5' 4\" (1.626 m) and weight is 220 lb (99.8 kg). Her oral temperature is 98.1 °F (36.7 °C). Her blood pressure is 114/88 and her pulse is 79. Her respiration is 15 and oxygen saturation is 96%. Physical Exam  Constitutional:       General: She is not in acute distress. Appearance: She is obese. She is not toxic-appearing. Eyes:      Pupils: Pupils are equal, round, and reactive to light. Cardiovascular:      Rate and Rhythm: Normal rate and regular rhythm. Pulmonary:      Effort: Pulmonary effort is normal.      Breath sounds: Decreased breath sounds present. Abdominal:      General: Abdomen is flat. Bowel sounds are normal. There is no distension. Tenderness: There is no abdominal tenderness. Skin:     General: Skin is warm and dry. Capillary Refill: Capillary refill takes less than 2 seconds. Neurological:      General: No focal deficit present. Mental Status: She is alert. Psychiatric:         Mood and Affect: Mood normal.         Behavior: Behavior normal.          DIFFERENTIAL DIAGNOSIS:   Covid pneumonia, pneumonia, less likely CHF, COPD, do not suspect PE due to anticoagulated status, ACS    DIAGNOSTIC RESULTS     EKG: All EKG's are interpreted by the Emergency Department Physician who either signs or Co-signs this chart in the absence of a cardiologist.    Normal sinus rhythm ventricular rate 78 bpm.  WY interval 138, QRS 92, corrected  ms    RADIOLOGY: non-plainfilm images(s) such as CT, Ultrasound and MRI are read by the radiologist.    XR CHEST PORTABLE   Final Result   No acute findings.       This document has been electronically signed by: Tanya Ponce MD on    11/13/2020 12:41 AM             LABS:     Labs Reviewed   CBC WITH AUTO DIFFERENTIAL - Abnormal; Notable for the following components:       Result Value    RDW-CV 15.9 (*) RDW-SD 51.6 (*)     All other components within normal limits   COMPREHENSIVE METABOLIC PANEL W/ REFLEX TO MG FOR LOW K - Abnormal; Notable for the following components:    Glucose 112 (*)     Total Bilirubin 0.2 (*)     All other components within normal limits   APTT - Abnormal; Notable for the following components:    aPTT 43.7 (*)     All other components within normal limits   PROTIME-INR - Abnormal; Notable for the following components:    INR 3.18 (*)     All other components within normal limits   PROCALCITONIN   BRAIN NATRIURETIC PEPTIDE   ANION GAP   GLOMERULAR FILTRATION RATE, ESTIMATED   OSMOLALITY       EMERGENCY DEPARTMENT COURSE:   Vitals:    Vitals:    11/12/20 2310 11/13/20 0054   BP: 123/86 114/88   Pulse: 80 79   Resp: 18 15   Temp: 98.1 °F (36.7 °C)    TempSrc: Oral    SpO2: 97% 96%   Weight: 220 lb (99.8 kg)    Height: 5' 4\" (1.626 m)        12:18 AM EST: The patient was seen and evaluated. Appropriate labs are ordered. Patient is reporting short of breath but remains O2 saturations 96 to 98% on room air. Patient's labs are reassuring. Her white blood cell count 5.3. H&H remained stable. Procalcitonin minimally elevated at 0.05.  proBNP 15.4. Electrolytes essentially normal, liver enzymes essentially normal INR 3.18. Chest x-ray shows no acute findings. Patient does have few scattered wheezes throughout. She is treated with IV dexamethasone in the department. MDM:  The patient is Covid positive. She is developed dyspnea over the past 24 to 48 hours. Patient has albuterol inhaler at home that she has not been using. The patient will be discharged home with dexamethasone for 5-day course. Advised to use albuterol 2 puffs every 4-6 hours as needed for wheezing/short of breath. Follow-up primary care provider in 2 to 3 days for reevaluation. Return to the emergency department for worsening shortness of breath, chest pain, temperature 100.5 or greater, new concerns.   Patient verbalized understanding of all instructions. CRITICAL CARE:   None    CONSULTS:  none    PROCEDURES:  none    FINAL IMPRESSION      1. COVID-19 virus infection          DISPOSITION/PLAN   Discharge    PATIENT REFERRED TO:  Pao Fabian, APRN - CNP  [address]  [phone]            DISCHARGE MEDICATIONS:  Discharge Medication List as of 11/13/2020  1:42 AM      START taking these medications    Details   dexamethasone (DECADRON) 6 MG tablet Take 1 tablet by mouth 2 times daily (with meals) for 5 days, Disp-10 tablet,R-0Print             (Please note that portions of this note were completed with a voice recognition program.  Efforts were made to edit the dictations but occasionally words are mis-transcribed.)    The patient was given an opportunity to see the Emergency Attending. The patient voiced understanding that I was a Mid-LevelProvider and was in agreement with being seen independently by myself.           Lauri Turpin, LUIZ - FAUSTO  11/13/20 9747

## 2020-11-13 NOTE — ED NOTES
Pt to the ED with complaints of SOB. Pt states that she was tested and had a positive result on Monday. Pt states since then she has grown increasingly SOB, especially while going up stairs. Pt denies cough, denies fever, denies pain. VS obtained. EKG obtained. IV established and blood sent to lab.       Tamanna Parekh RN  11/12/20 4443

## 2020-11-14 ENCOUNTER — CARE COORDINATION (OUTPATIENT)
Dept: CARE COORDINATION | Age: 55
End: 2020-11-14

## 2020-11-14 NOTE — CARE COORDINATION
Enrolled into Loop       Patient contacted regarding LBBER-44 diagnosis\". Discussed COVID-19 related testing which was available at this time. Test results were positive. Patient informed of results, if available? No    Care Transition Nurse/ Ambulatory Care Manager contacted the patient by telephone to perform post discharge assessment. Call within 2 business days of discharge: Yes. Verified name and  with patient as identifiers. Provided introduction to self, and explanation of the CTN/ACM role, and reason for call due to risk factors for infection and/or exposure to COVID-19. Symptoms reviewed with patient who verbalized the following symptoms: shortness of breath. Due to no new or worsening symptoms encounter was not routed to provider for escalation. Discussed follow-up appointments. If no appointment was previously scheduled, appointment scheduling offered: [facility] follow up appointment(s):   Future Appointments   Date Time Provider Roger Cortez   2020  3:40 PM Lindsey Neal, [address] [place] [address]   2021  4:00 PM Donn Hinton MD [facility]     Non-[facility] follow up appointment(s):     Non-face-to-face services provided:  Obtained and reviewed discharge summary and/or continuity of care documents     Advance Care Planning:   Does patient have an Advance Directive:  reviewed and current. Patient has following risk factors of: diabetes. CTN/ACM reviewed discharge instructions, medical action plan and red flags such as increased shortness of breath, increasing fever and signs of decompensation with patient who verbalized understanding. Discussed exposure protocols and quarantine with CDC Guidelines What to do if you are sick with coronavirus disease .  Patient was given an opportunity for questions and concerns.  The patient agrees to contact the [facility] exposure line [phone], [facility] department [place] Department of [facility]: ([phone]) and PCP office for questions related to their healthcare. CTN/ACM provided contact information for future needs. Reviewed and educated patient on any new and changed medications related to discharge diagnosis     Patient/family/caregiver given information for [employer] [facility] and agrees to enroll yes  Patient's preferred e-mail:    Patient's preferred phone number: [phone]  Based on Loop alert triggers, patient will be contacted by nurse care manager for worsening symptoms. Pt will be further monitored by COVID Loop Team based on severity of symptoms and risk factors.

## 2020-11-23 ENCOUNTER — TELEPHONE (OUTPATIENT)
Dept: PHARMACY | Age: 55
End: 2020-11-23

## 2020-11-23 NOTE — TELEPHONE ENCOUNTER
Noted patient was in ER 11/12 for SOB/COVID +.  5 days of Dexamethasone 6 mg BID ordered. INR there 3.18. Currently on clinic schedule for  INR tomorrow.   Message forwarded to clinic pharmacist.

## 2020-11-24 ENCOUNTER — HOSPITAL ENCOUNTER (OUTPATIENT)
Dept: PHARMACY | Age: 55
Setting detail: THERAPIES SERIES
Discharge: HOME | End: 2020-11-24
Payer: COMMERCIAL

## 2020-11-24 VITALS — TEMPERATURE: 98 F

## 2020-11-24 LAB — POC INR: 4.8 (ref 0.8–1.2)

## 2020-11-24 PROCEDURE — 85610 PROTHROMBIN TIME: CPT

## 2020-11-24 PROCEDURE — 36416 COLLJ CAPILLARY BLOOD SPEC: CPT

## 2020-11-24 PROCEDURE — 99211 OFF/OP EST MAY X REQ PHY/QHP: CPT

## 2020-12-03 ENCOUNTER — HOSPITAL ENCOUNTER (OUTPATIENT)
Dept: PHARMACY | Age: 55
Setting detail: THERAPIES SERIES
Discharge: HOME | End: 2020-12-03
Payer: COMMERCIAL

## 2020-12-03 VITALS — TEMPERATURE: 97.6 F

## 2020-12-03 LAB — POC INR: 4.2 (ref 0.8–1.2)

## 2020-12-03 PROCEDURE — 85610 PROTHROMBIN TIME: CPT

## 2020-12-03 PROCEDURE — 99211 OFF/OP EST MAY X REQ PHY/QHP: CPT

## 2020-12-03 PROCEDURE — 36416 COLLJ CAPILLARY BLOOD SPEC: CPT

## 2020-12-03 NOTE — PROGRESS NOTES
Medication Management [address]  [phone] (phone)  [phone] (fax)      Ms. Lonnie Cook is a 54 y.o.  female with history of PE who presents today for anticoagulation monitoring and adjustment. Patient verifies current dosing regimen and tablet strength. No missed or extra doses. Patient denies s/s bleeding/bruising/swelling/SOB/chest pain  No blood in urine or stool. No dietary changes. No changes in medication/OTC agents/Herbals. No change in alcohol use or tobacco use. No change in activity level. Tested COVID + 11/6. Went to ER 11/12 for SOB - 5 days of Decadron ordered, but only took 3 pills. Last week appointment had reported she hadn't been eating her cabbage/ritchie greens for a few weeks when ill- today she states she has resumed these back into her diet. Patient denies headaches/dizziness/lightheadedness/falls. No vomiting/diarrhea or acute illness. No Procedures scheduled in the future at this time. Assessment:   Lab Results   Component Value Date    INR 4.20 (H) 12/03/2020    INR 4.80 (H) 11/24/2020    INR 3.18 (H) 11/12/2020     INR supratherapeutic   Recent Labs     12/03/20  1608   INR 4.20*     Patient interview completed and discussed with pharmacist by CÉSAR Lee PharmD Candidate 2021    Plan:  Hold Coumadin today, 10 mg tomorrow then continue Coumadin 15 mg MoWeFr and 10 mg SuTuThSa. Recheck INR in 1 week(s). Patient reminded to call the Anticoagulation Clinic with any signs or symptoms of bleeding or with any medication changes. Patient given instructions utilizing the teach back method. Discharged ambulatory in no apparent distress. After visit summary printed and reviewed with patient.       Medications reviewed and updated on home medication list Yes    Influenza vaccine:     [] given    [x] declined   [x] received previously   [] plans to receive at a later time   [] refused    [x] documented in [facility]    CLINICAL PHARMACY CONSULT: MED RECONCILIATION/REVIEW ADDENDUM  For Pharmacy Admin Tracking Only  PHSO: Yes  Total # of Interventions Recommended: 1  - Decreased Dose #: 1  - Maintenance Safety Lab Monitoring #: 1  Total Interventions Accepted: 1  Time Spent (min): [address] [facility]

## 2020-12-10 ENCOUNTER — HOSPITAL ENCOUNTER (OUTPATIENT)
Dept: PHARMACY | Age: 55
Setting detail: THERAPIES SERIES
Discharge: HOME | End: 2020-12-10
Payer: COMMERCIAL

## 2020-12-10 VITALS — TEMPERATURE: 98 F

## 2020-12-10 LAB — POC INR: 2.5 (ref 0.8–1.2)

## 2020-12-10 PROCEDURE — 36416 COLLJ CAPILLARY BLOOD SPEC: CPT | Performed by: PHARMACIST

## 2020-12-10 PROCEDURE — 99211 OFF/OP EST MAY X REQ PHY/QHP: CPT | Performed by: PHARMACIST

## 2020-12-10 NOTE — PROGRESS NOTES
Medication Management [address]  [phone] (phone)  [phone] (fax)      Ms. Dang Reinoso is a 54 y.o.  female with history of PE who presents today for anticoagulation monitoring and adjustment. Patient verifies current dosing regimen and tablet strength. No missed or extra doses. Patient denies s/s bleeding/swelling/SOB/chest pain. One new bruise on arm that is small and from an unknown cause. No blood in urine or stool. No dietary changes. No changes in medication/OTC agents/Herbals. No change in alcohol use or tobacco use. No change in activity level. Patient denies headaches/dizziness/lightheadedness/falls. No vomiting/diarrhea or acute illness. No Procedures scheduled in the future at this time. Assessment:   Lab Results   Component Value Date    INR 2.50 (H) 12/10/2020    INR 4.20 (H) 12/03/2020    INR 4.80 (H) 11/24/2020     INR therapeutic   Recent Labs     12/10/20  1552   INR 2.50*       Patient interview completed and discussed with pharmacist by Danielle Ramirez, AundreaD Candidate 2021    Previous supratherapeutic INRs were attributed to COVID illness. Pt states that she is now back to previous state of health and previous routines. Plan:  Continue Coumadin 15mg MWF and 10mg TThSaS. Recheck INR in 2.5 week(s). Patient reminded to call the Anticoagulation Clinic with any signs or symptoms of bleeding or with any medication changes. Patient given instructions utilizing the teach back method. Discharged ambulatory in no apparent distress. After visit summary printed and reviewed with patient.       Medications reviewed and updated on home medication list Yes    Influenza vaccine:     [] given    [x] declined   [x] received previously   [] plans to receive at a later time   [] refused    [] documented in [address]: MED RECONCILIATION/REVIEW [address]: No  Total # of Interventions Recommended: 0  - Maintenance Safety Lab Monitoring #: 1  Total Interventions Accepted: 0  Time Spent (min): 20    Aundrea TomD

## 2020-12-29 ENCOUNTER — HOSPITAL ENCOUNTER (OUTPATIENT)
Dept: PHARMACY | Age: 55
Setting detail: THERAPIES SERIES
Discharge: HOME | End: 2020-12-29
Payer: COMMERCIAL

## 2020-12-29 VITALS — TEMPERATURE: 97.7 F

## 2020-12-29 LAB — POC INR: 4.6 (ref 0.8–1.2)

## 2020-12-29 PROCEDURE — 36416 COLLJ CAPILLARY BLOOD SPEC: CPT

## 2020-12-29 PROCEDURE — 85610 PROTHROMBIN TIME: CPT

## 2020-12-29 PROCEDURE — 99211 OFF/OP EST MAY X REQ PHY/QHP: CPT

## 2020-12-29 NOTE — PROGRESS NOTES
Medication Management [address]  [phone] (phone)  [phone] (fax)      Ms. Celsa Crowe is a 54 y.o.  female with history of PE who presents today for anticoagulation monitoring and adjustment. Patient verifies current dosing regimen and tablet strength. No missed or extra doses. Patient denies s/s bleeding/bruising/swelling/SOB/chest pain  No blood in urine or stool. Patient has been eating more around the holidays. No changes in medication/OTC agents/Herbals. No change in tobacco use. Patient has increased alcohol intake. She usually has ~1 drink on each weekend night, but she has been drinking ~2 drinks about 3 days/week additionally since she is off for the holidays. No change in activity level. Patient denies headaches/dizziness/lightheadedness/falls. No vomiting/diarrhea or acute illness. No Procedures scheduled in the future at this time. Assessment:   Lab Results   Component Value Date    INR 4.60 (H) 12/29/2020    INR 2.50 (H) 12/10/2020    INR 4.20 (H) 12/03/2020     INR supratherapeutic   Recent Labs     12/29/20  0854   INR 4.60*     Patient interview completed and discussed with pharmacist by Aundrea WhitneyD Candidate [id number]    Patient may be supratherapeutic due to increased alcohol intake. Patient has been supratherapeutic at three of the past four INR checks while on current regimen. Plan:  HOLD Coumadin x 1 dose today 12/29/20, then Coumadin 5 mg x 1 dose tomorrow 12/30/20, then decrease Coumadin from 15 mg MWF and 10 mg TuThSaSu to Coumadin 15 mg W and 10 mg MTuThFSaSu (11.8% decrease). Recheck INR in 10 day(s). Patient reminded to call the Anticoagulation Clinic with signs or symptoms of bleeding or with any medication changes. Patient given instructions utilizing the teach back method. Discharged ambulatory in no apparent distress. After visit summary printed and reviewed with patient.

## 2021-01-07 ENCOUNTER — HOSPITAL ENCOUNTER (OUTPATIENT)
Dept: PHARMACY | Age: 56
Setting detail: THERAPIES SERIES
Discharge: HOME | End: 2021-01-07
Payer: COMMERCIAL

## 2021-01-07 VITALS — TEMPERATURE: 97.5 F

## 2021-01-07 DIAGNOSIS — I26.99 PULMONARY EMBOLISM, UNSPECIFIED CHRONICITY, UNSPECIFIED PULMONARY EMBOLISM TYPE, UNSPECIFIED WHETHER ACUTE COR PULMONALE PRESENT (HCC): ICD-10-CM

## 2021-01-07 LAB — POC INR: 2.4 (ref 0.8–1.2)

## 2021-01-07 PROCEDURE — 99211 OFF/OP EST MAY X REQ PHY/QHP: CPT | Performed by: PHARMACIST

## 2021-01-07 PROCEDURE — 85610 PROTHROMBIN TIME: CPT | Performed by: PHARMACIST

## 2021-01-07 PROCEDURE — 36416 COLLJ CAPILLARY BLOOD SPEC: CPT | Performed by: PHARMACIST

## 2021-01-07 NOTE — PROGRESS NOTES
Medication Management [address]  [phone] (phone)  [phone] (fax)      Ms. Charlie Mcbride is a 54 y.o.  female with history of PE who presents today for anticoagulation monitoring and adjustment. Patient verifies current dosing regimen and tablet strength. No missed or extra doses. Patient denies s/s bleeding/bruising/swelling/SOB/chest pain  No blood in urine or stool. No dietary changes. No changes in medication/OTC agents/Herbals. No change in alcohol use or tobacco use. No change in activity level. Patient denies headaches/dizziness/lightheadedness/falls. No vomiting/diarrhea or acute illness. No Procedures scheduled in the future at this time. Assessment:   Lab Results   Component Value Date    INR 2.40 (H) 01/07/2021    INR 4.60 (H) 12/29/2020    INR 2.50 (H) 12/10/2020     INR therapeutic   Recent Labs     01/07/21  1555   INR 2.40*     Patient interview completed and discussed with pharmacist by CÉSAR Miranda PharmD Candidate [id number]      Plan:  Increase Coumadin to previous regimen of 15mg MWF and 10mg TThSaS. Had detailed discussion with pt. Had suggested continuing current reduced dose, pt did not want do this. Pt states that her old regimen was stable for many months, and recent supratherapeutic INRs were due to COVID and alcohol use. Recheck INR in 2 week(s). Patient reminded to call the Anticoagulation Clinic with signs or symptoms of bleeding or with any medication changes. Patient given instructions utilizing the teach back method. Discharged ambulatory in no apparent distress. After visit summary printed and reviewed with patient.       Medications reviewed and updated on home medication list Yes    Influenza vaccine:     [] given    [x] declined   [x] received previously   [] plans to receive at a later time   [] refused    [x] documented in Epic CLINICAL PHARMACY CONSULT: MED RECONCILIATION/REVIEW ADDENDUM    For Pharmacy Admin Tracking Only    PHSO: No  Total # of Interventions Recommended: 1  - Increased Dose #: 1  - Maintenance Safety Lab Monitoring #: 1  Total Interventions Accepted: 1  Time Spent (min): 20    Yolanda Cabral, AundreaD

## 2021-01-21 ENCOUNTER — HOSPITAL ENCOUNTER (OUTPATIENT)
Dept: PHARMACY | Age: 56
Setting detail: THERAPIES SERIES
Discharge: HOME | End: 2021-01-21
Payer: COMMERCIAL

## 2021-01-21 VITALS — TEMPERATURE: 97.9 F

## 2021-01-21 DIAGNOSIS — I26.99 PULMONARY EMBOLISM, UNSPECIFIED CHRONICITY, UNSPECIFIED PULMONARY EMBOLISM TYPE, UNSPECIFIED WHETHER ACUTE COR PULMONALE PRESENT (HCC): ICD-10-CM

## 2021-01-21 LAB — POC INR: 1.9 (ref 0.8–1.2)

## 2021-01-21 PROCEDURE — 85610 PROTHROMBIN TIME: CPT | Performed by: PHARMACIST

## 2021-01-21 PROCEDURE — 99211 OFF/OP EST MAY X REQ PHY/QHP: CPT | Performed by: PHARMACIST

## 2021-01-21 PROCEDURE — 36416 COLLJ CAPILLARY BLOOD SPEC: CPT | Performed by: PHARMACIST

## 2021-01-21 NOTE — PROGRESS NOTES
Medication Management [address]  [phone] (phone)  [phone] (fax)      Ms. Hieu Barron is a 54 y.o.  female with history of PE who presents today for anticoagulation monitoring and adjustment. Patient verifies current dosing regimen and tablet strength. No missed or extra doses. Patient denies s/s bleeding/bruising/swelling/SOB/chest pain  No blood in urine or stool. Patient reports increased vitamin k foods from baseline of 1 time weekly to 3 times in the past week. Patient plans to eat vitamin k foods \"at least once a week\" in the future. No changes in medication/OTC agents/Herbals. No change in alcohol use or tobacco use. No change in activity level. Patient denies headaches/dizziness/lightheadedness/falls. No vomiting/diarrhea or acute illness. No Procedures scheduled in the future at this time. Assessment:   Lab Results   Component Value Date    INR 1.90 (H) 01/21/2021    INR 2.40 (H) 01/07/2021    INR 4.60 (H) 12/29/2020     INR subtherapeutic   Recent Labs     01/21/21  1600   INR 1.90*     Patient interview completed and discussed with pharmacist by CÉSAR Fournier PharmD Candidate [id number]      Plan:  Coumadin 12.5mg today and then continue 15mg MWF and 10mg TThSaS. Recheck INR in 2.5 week(s). Patient reminded to call the Anticoagulation Clinic with any signs or symptoms of bleeding or with any medication changes. Patient given instructions utilizing the teach back method. Discharged ambulatory in no apparent distress. After visit summary printed and reviewed with patient.       Medications reviewed and updated on home medication list Yes    Influenza vaccine:     [] given    [x] declined   [x] received previously   [] plans to receive at a later time   [] refused    [x] documented in [address]: No Total # of Interventions Recommended: 1  - Increased Dose #: 1  - Maintenance Safety Lab Monitoring #: 1  Total Interventions Accepted: 1  Time Spent (min): 20    Aundrea MohanD

## 2021-02-09 ENCOUNTER — HOSPITAL ENCOUNTER (OUTPATIENT)
Dept: PHARMACY | Age: 56
Setting detail: THERAPIES SERIES
Discharge: HOME | End: 2021-02-09
Payer: COMMERCIAL

## 2021-02-09 VITALS — TEMPERATURE: 97.7 F

## 2021-02-09 DIAGNOSIS — I26.99 PULMONARY EMBOLISM, UNSPECIFIED CHRONICITY, UNSPECIFIED PULMONARY EMBOLISM TYPE, UNSPECIFIED WHETHER ACUTE COR PULMONALE PRESENT (HCC): ICD-10-CM

## 2021-02-09 LAB — POC INR: 2.4 (ref 0.8–1.2)

## 2021-02-09 PROCEDURE — 99211 OFF/OP EST MAY X REQ PHY/QHP: CPT | Performed by: PHARMACIST

## 2021-02-09 PROCEDURE — 85610 PROTHROMBIN TIME: CPT | Performed by: PHARMACIST

## 2021-02-09 PROCEDURE — 36416 COLLJ CAPILLARY BLOOD SPEC: CPT | Performed by: PHARMACIST

## 2021-02-09 NOTE — PROGRESS NOTES
Medication Management [address]  [phone] (phone)  [phone] (fax)      Ms. Raya Lopez is a 54 y.o.  female with history of PE who presents today for anticoagulation monitoring and adjustment. Patient verifies current dosing regimen and tablet strength. No missed or extra doses. Patient denies s/s bleeding/bruising/swelling/SOB/chest pain  No blood in urine or stool. No dietary changes. Back to usual diet. No changes in medication/OTC agents/Herbals. No change in alcohol use or tobacco use. No change in activity level. Patient denies headaches/dizziness/lightheadedness/falls. No vomiting/diarrhea or acute illness. No Procedures scheduled in the future at this time. Assessment:   Lab Results   Component Value Date    INR 2.40 (H) 02/09/2021    INR 1.90 (H) 01/21/2021    INR 2.40 (H) 01/07/2021     INR therapeutic   Recent Labs     02/09/21  1621   INR 2.40*         Plan:  Continue Coumadin 15mg MWF and 10mg TThSaS. Recheck INR in 4 week(s). Patient reminded to call the Anticoagulation Clinic with any signs or symptoms of bleeding or with any medication changes. Patient given instructions utilizing the teach back method. Discharged ambulatory in no apparent distress. After visit summary printed and reviewed with patient.       Medications reviewed and updated on home medication list Yes    Influenza vaccine:     [] given    [] declined   [] received previously   [] plans to receive at a later time   [] refused    [x] documented in [facility] [address]: [address]: No  Total # of Interventions Recommended: 0  - Maintenance Safety Lab Monitoring #: 1  Total Interventions Accepted: 0  Time Spent (min): 20    Fantasma Duff, PharmD

## 2021-02-26 ENCOUNTER — TELEPHONE (OUTPATIENT)
Dept: PHARMACY | Age: 56
End: 2021-02-26

## 2021-02-26 NOTE — TELEPHONE ENCOUNTER
Pt calls to report that she has been prescribed Topamax and Adipex. Informed pt no adjustment to Coumadin regimen necessary at this time.

## 2021-03-09 ENCOUNTER — HOSPITAL ENCOUNTER (OUTPATIENT)
Dept: PHARMACY | Age: 56
Setting detail: THERAPIES SERIES
Discharge: HOME | End: 2021-03-09
Payer: COMMERCIAL

## 2021-03-09 VITALS — TEMPERATURE: 97.6 F

## 2021-03-09 DIAGNOSIS — I26.99 PULMONARY EMBOLISM, UNSPECIFIED CHRONICITY, UNSPECIFIED PULMONARY EMBOLISM TYPE, UNSPECIFIED WHETHER ACUTE COR PULMONALE PRESENT (HCC): ICD-10-CM

## 2021-03-09 LAB — POC INR: 3.3 (ref 0.8–1.2)

## 2021-03-09 PROCEDURE — 99212 OFFICE O/P EST SF 10 MIN: CPT

## 2021-03-09 PROCEDURE — 36416 COLLJ CAPILLARY BLOOD SPEC: CPT

## 2021-03-09 PROCEDURE — 85610 PROTHROMBIN TIME: CPT

## 2021-03-09 RX ORDER — WARFARIN SODIUM 5 MG/1
TABLET ORAL EVERY EVENING
COMMUNITY
End: 2021-03-30

## 2021-03-09 RX ORDER — CAL/D3/MAG11/ZINC/COP/MANG/BOR 600 MG-800
2 TABLET ORAL DAILY
COMMUNITY
Start: 2021-02-24

## 2021-03-09 RX ORDER — WARFARIN SODIUM 5 MG/1
TABLET ORAL
Qty: 270 TABLET | Refills: 3 | Status: SHIPPED | OUTPATIENT
Start: 2021-03-09 | End: 2022-01-31 | Stop reason: SDUPTHER

## 2021-03-09 RX ORDER — PHENTERMINE HYDROCHLORIDE 37.5 MG/1
TABLET ORAL
COMMUNITY
Start: 2021-02-24 | End: 2021-06-22

## 2021-03-09 RX ORDER — TOPIRAMATE 50 MG/1
TABLET, FILM COATED ORAL
COMMUNITY
Start: 2021-02-24 | End: 2021-04-27

## 2021-03-09 NOTE — PROGRESS NOTES
Medication Management [address]  [phone] (phone)  [phone] (fax)      Ms. Traci Rider is a 54 y.o.  female with history of PE, per Dr. Manjit Wiley referral, who presents today for Warfarin  monitoring and adjustment (3 week visit - late for today's visit). Patient verifies current dosing regimen and tablet strength. No missed or extra doses. Patient denies bleeding/bruising/swelling/SOB/chest pain. No blood in urine or stool. No dietary changes. No changes in medication/OTC agents/herbals. No change in alcohol use or tobacco use. Change in activity level: quite decreased - off work until 3/16. Patient denies headaches/dizziness/lightheadedness/falls. No vomiting/diarrhea or acute illness. No procedures scheduled in the future at this time. Had first COVID vaccine - will document when card copied. Assessment:   Lab Results   Component Value Date    INR 3.30 (H) 03/09/2021    INR 2.40 (H) 02/09/2021    INR 1.90 (H) 01/21/2021     INR supratherapeutic - goal 2-3. Recent Labs     03/09/21  1548   INR 3.30*       Plan:  POCT INR ordered/performed/result reviewed. 5 mg today, T (per policy), then continue PO Coumadin 15 mg MWF, 10 mg TThSS. Recheck INR in 3 week(s), per policy. Patient reminded to call the Anticoagulation Clinic with any signs or symptoms of bleeding or with any medication changes. Patient given instructions utilizing the teach back method. Advised extra caution. Discharged ambulatory in no apparent distress, wearing mask. Prescription renewed electronically by clinic pharmacist.      After visit summary printed and reviewed with patient.       Medications reviewed and updated on home medication list.    Influenza vaccine:     [] given    [x] declined   [x] received previously   [] plans to receive at a later time   [] refused    [x] documented in Epic

## 2021-03-15 ENCOUNTER — TELEPHONE (OUTPATIENT)
Dept: PHARMACY | Age: 56
End: 2021-03-15

## 2021-03-15 NOTE — TELEPHONE ENCOUNTER
Patient states she flip flopped her doses and took 15 mg on Thursday 3/11/21 (instructed 10 mg) and took 10 mg on Friday 3/12/21 (instructed 15 mg). Instructed her to resume home regimen 15 mg MWF and 10 mg TuThSaSu with INR 3/30/21 as originally planned. Patient voiced understanding. Patient given instructions utilizing the teach back method.     Jose Aguila PharmD, BCPS  3/15/2021  1:02 PM

## 2021-03-30 ENCOUNTER — HOSPITAL ENCOUNTER (OUTPATIENT)
Dept: PHARMACY | Age: 56
Setting detail: THERAPIES SERIES
Discharge: HOME | End: 2021-03-30
Payer: COMMERCIAL

## 2021-03-30 DIAGNOSIS — I26.99 PULMONARY EMBOLISM, UNSPECIFIED CHRONICITY, UNSPECIFIED PULMONARY EMBOLISM TYPE, UNSPECIFIED WHETHER ACUTE COR PULMONALE PRESENT (HCC): ICD-10-CM

## 2021-03-30 DIAGNOSIS — Z51.81 ENCOUNTER FOR THERAPEUTIC DRUG MONITORING: ICD-10-CM

## 2021-03-30 DIAGNOSIS — Z79.01 ANTICOAGULATED ON COUMADIN: ICD-10-CM

## 2021-03-30 LAB — POC INR: 2.6 (ref 0.8–1.2)

## 2021-03-30 PROCEDURE — 99211 OFF/OP EST MAY X REQ PHY/QHP: CPT

## 2021-03-30 PROCEDURE — 85610 PROTHROMBIN TIME: CPT

## 2021-03-30 PROCEDURE — 36416 COLLJ CAPILLARY BLOOD SPEC: CPT

## 2021-04-26 DIAGNOSIS — Z79.01 ANTICOAGULATED ON COUMADIN: Primary | ICD-10-CM

## 2021-04-26 DIAGNOSIS — I26.99 OTHER ACUTE PULMONARY EMBOLISM WITHOUT ACUTE COR PULMONALE (HCC): ICD-10-CM

## 2021-04-27 ENCOUNTER — HOSPITAL ENCOUNTER (OUTPATIENT)
Dept: PHARMACY | Age: 56
Setting detail: THERAPIES SERIES
Discharge: HOME | End: 2021-04-27
Payer: COMMERCIAL

## 2021-04-27 DIAGNOSIS — Z79.01 ANTICOAGULATED ON COUMADIN: ICD-10-CM

## 2021-04-27 DIAGNOSIS — Z51.81 ENCOUNTER FOR THERAPEUTIC DRUG MONITORING: ICD-10-CM

## 2021-04-27 LAB — POC INR: 2.9 (ref 0.8–1.2)

## 2021-04-27 PROCEDURE — 85610 PROTHROMBIN TIME: CPT

## 2021-04-27 PROCEDURE — 99211 OFF/OP EST MAY X REQ PHY/QHP: CPT

## 2021-04-27 PROCEDURE — 36416 COLLJ CAPILLARY BLOOD SPEC: CPT

## 2021-04-27 NOTE — PROGRESS NOTES
Medication Management [address]  [phone] (phone)  [phone] (fax)      Ms. Zia Riley is a 64 y.o.  female with history of PE who presents today for anticoagulation monitoring and adjustment. Patient verifies current dosing regimen and tablet strength. No missed or extra doses. Patient denies s/s bleeding/bruising/swelling/SOB/chest pain  No blood in urine or stool. No dietary changes. No changes in OTC agents/Herbals. Patient is no longer using Topamax  No change in alcohol use or tobacco use. No change in activity level. Patient denies headaches/dizziness/lightheadedness/falls. No vomiting/diarrhea or acute illness. No Procedures scheduled in the future at this time. Assessment:   Lab Results   Component Value Date    INR 2.90 (H) 04/27/2021    INR 2.60 (H) 03/30/2021    INR 3.30 (H) 03/09/2021     INR therapeutic   Recent Labs     04/27/21  1608   INR 2.90*     Patient has been therapeutic at three out of last four INR checks while on current regimen. Plan:  Continue Coumadin 15 mg MWF and 10 mg TuThSaSu. Recheck INR in 4 week(s). Patient reminded to call the Anticoagulation Clinic with any signs or symptoms of bleeding or with any medication changes. Patient given instructions utilizing the teach back method. Discharged ambulatory in no apparent distress. After visit summary printed and reviewed with patient.       Medications reviewed and updated on home medication list Yes    Influenza vaccine:     [] given    [x] declined   [] received previously   [] plans to receive at a later time   [x] refused    [] documented in [address] Total # of Interventions Recommended: 0   Total # of Interventions Accepted: 0   Time Spent (min): 1300 [facility] Aundrea StringerD, BCPS  4/27/2021  4:19 PM

## 2021-05-25 ENCOUNTER — APPOINTMENT (OUTPATIENT)
Dept: PHARMACY | Age: 56
End: 2021-05-25
Payer: COMMERCIAL

## 2021-05-25 ENCOUNTER — HOSPITAL ENCOUNTER (OUTPATIENT)
Dept: PHARMACY | Age: 56
Setting detail: THERAPIES SERIES
Discharge: HOME | End: 2021-05-25
Payer: COMMERCIAL

## 2021-05-25 DIAGNOSIS — Z51.81 ENCOUNTER FOR THERAPEUTIC DRUG MONITORING: ICD-10-CM

## 2021-05-25 DIAGNOSIS — I26.99 PULMONARY EMBOLISM, UNSPECIFIED CHRONICITY, UNSPECIFIED PULMONARY EMBOLISM TYPE, UNSPECIFIED WHETHER ACUTE COR PULMONALE PRESENT (HCC): ICD-10-CM

## 2021-05-25 DIAGNOSIS — Z79.01 ANTICOAGULATED ON COUMADIN: Primary | ICD-10-CM

## 2021-05-25 LAB — POC INR: 2.4 (ref 0.8–1.2)

## 2021-05-25 PROCEDURE — 36416 COLLJ CAPILLARY BLOOD SPEC: CPT

## 2021-05-25 PROCEDURE — 85610 PROTHROMBIN TIME: CPT

## 2021-05-25 PROCEDURE — 99211 OFF/OP EST MAY X REQ PHY/QHP: CPT

## 2021-05-25 NOTE — PROGRESS NOTES
Medication Management [address]  [phone] (phone)  [phone] (fax)    Ms. Dheeraj Saez is a 64 y.o.  female with history of PE, per Dr. Lisa Escobar referral, who presents today for Warfarin monitoring and adjustment (4 week visit). Patient verifies current dosing regimen and tablet strength. No missed or extra doses. Patient denies bleeding/bruising/swelling/SOB/chest pain. No blood in urine or stool. No dietary changes. No changes in medication/OTC agents/herbals. No change in alcohol use or tobacco use. No change in activity level. Patient denies headaches/dizziness/lightheadedness/falls. No vomiting/diarrhea or acute illness. No procedures scheduled in the future at this time. Assessment:   Lab Results   Component Value Date    INR 2.40 (H) 05/25/2021    INR 2.90 (H) 04/27/2021    INR 2.60 (H) 03/30/2021     INR therapeutic - goal 2-3. Recent Labs     05/25/21  1459   INR 2.40*       Plan:  POCT INR ordered/performed/result reviewed. Continue PO Coumadin 15 mg MWF, 10 mg TThSS. Recheck INR in 4 week(s). Patient reminded to call the Anticoagulation Clinic with any signs or symptoms of bleeding or with any medication changes. Patient given instructions utilizing the teach back method. Given routine H&H order. After visit summary printed and reviewed with patient. Discharged ambulatory in no apparent distress, wearing mask.

## 2021-05-28 LAB
HCT VFR BLD CALC: 39 % (ref 35–44)
HEMOGLOBIN: 13 GM/DL (ref 12–15)

## 2021-06-22 ENCOUNTER — HOSPITAL ENCOUNTER (OUTPATIENT)
Dept: PHARMACY | Age: 56
Setting detail: THERAPIES SERIES
Discharge: HOME | End: 2021-06-22
Payer: COMMERCIAL

## 2021-06-22 DIAGNOSIS — Z51.81 ENCOUNTER FOR THERAPEUTIC DRUG MONITORING: ICD-10-CM

## 2021-06-22 DIAGNOSIS — Z79.01 ANTICOAGULATED ON COUMADIN: Primary | ICD-10-CM

## 2021-06-22 LAB — POC INR: 2.5 (ref 0.8–1.2)

## 2021-06-22 PROCEDURE — 36416 COLLJ CAPILLARY BLOOD SPEC: CPT

## 2021-06-22 PROCEDURE — 85610 PROTHROMBIN TIME: CPT

## 2021-06-22 PROCEDURE — 99211 OFF/OP EST MAY X REQ PHY/QHP: CPT

## 2021-07-01 ENCOUNTER — OFFICE VISIT (OUTPATIENT)
Dept: CARDIOLOGY CLINIC | Age: 56
End: 2021-07-01
Payer: COMMERCIAL

## 2021-07-01 VITALS
DIASTOLIC BLOOD PRESSURE: 75 MMHG | HEIGHT: 64 IN | BODY MASS INDEX: 38.07 KG/M2 | SYSTOLIC BLOOD PRESSURE: 112 MMHG | HEART RATE: 86 BPM | WEIGHT: 223 LBS

## 2021-07-01 DIAGNOSIS — R94.31 ABNORMAL EKG: ICD-10-CM

## 2021-07-01 DIAGNOSIS — E78.5 DYSLIPIDEMIA: ICD-10-CM

## 2021-07-01 DIAGNOSIS — R93.1 ABNORMAL ECHOCARDIOGRAM: ICD-10-CM

## 2021-07-01 DIAGNOSIS — R00.1 SINUS BRADYCARDIA: Primary | ICD-10-CM

## 2021-07-01 PROCEDURE — 99214 OFFICE O/P EST MOD 30 MIN: CPT | Performed by: INTERNAL MEDICINE

## 2021-07-01 NOTE — PROGRESS NOTES
Chief Complaint   Patient presents with    Check-Up    Bradycardia   . Originally Was bath room felt CP and later she found herself on the floor and brought to the hospital and found to have PE and troponin elevation  Was seen by Dr. Claribel Mueller in the hospital      Pt here for 1 yr check up     EKG done today. cpap for TERESA used once in a while    Denies chest pain, sob, dizziness, edema, and palpitations    Had Hx of syncope sept 2013 while getting off toilate and Dx with PE    Dec 2009 had PE and sept 2013- PE      Patient Active Problem List   Diagnosis    Hx of Recurrent  pulmonary embolism dec 2009 and 09/2013- need to cont life long coumadin    hx of Syncope and collapse due to, PE    Obesity    Abnormal echocardiogram EF 55%, RVE and RV function reduced post PE    SOB (shortness of breath) on exertion- improving    Pulmonary embolus (HCC)    Deep vein thrombosis (HCC)    Annalee filter in place    Anticoagulated on Coumadin    Sinus bradycardia -  and pauses only in the night range from 2.1 to 3.7 sec pause-pat Dxed with TERESA 2013 and cpap- Holter monitor after CPAP got better    TERESA (obstructive sleep apnea)    Plantar fasciitis, right    Asthma    Type 2 diabetes mellitus (HCC)    Abnormal EKG    Diverticulitis of large intestine    Dyslipidemia    Degeneration of cervical intervertebral disc    Encounter for therapeutic drug monitoring       Past Surgical History:   Procedure Laterality Date    ANKLE SURGERY      Left Ankle.  plate    DILATION AND CURETTAGE OF UTERUS N/A 8/15/2019    HYSTEROSCOPY DILATATION AND CURETTAGE performed by Jeffry Harvey DO at [address] [place]      right wrist for tendonitis    TOE SURGERY  06/25/2019    LEFT SECOND TOE    VENA CAVA FILTER PLACEMENT         No Known Allergies     Family History   Problem Relation Age of Onset    Cancer Mother         breast    High Blood Pressure Sister     High Cholesterol Sister    Meyers Diabetes Brother     Stroke Maternal Grandmother         Social History     Socioeconomic History    Marital status: Single     Spouse name: Not on file    Number of children: Not on file    Years of education: Not on file    Highest education level: Not on file   Occupational History    Not on file   Tobacco Use    Smoking status: Never Smoker    Smokeless tobacco: Never Used   Vaping Use    Vaping Use: Never used   Substance and Sexual Activity    Alcohol use: Yes     Comment: Ocassionally    Drug use: No    Sexual activity: Not Currently     Partners: Male   Other Topics Concern    Not on file   Social History Narrative    Not on file     Social Determinants of Health     Financial Resource Strain:     Difficulty of Paying Living Expenses:    Food Insecurity:     Worried About Running Out of Food in the Last Year:     [address] in the Last Year:    Transportation Needs:     Lack of Transportation (Medical):  Lack of Transportation (Non-Medical):    Physical Activity:     Days of Exercise per Week:     Minutes of Exercise per Session:    Stress:     Feeling of Stress :    Social Connections:     Frequency of Communication with Friends and Family:     Frequency of Social Gatherings with Friends and Family:     Attends [employer] Services:     Active Member of Clubs or Organizations:     Attends Club or Organization Meetings:     Marital Status:    Intimate Partner Violence:     Fear of Current or Ex-Partner:     Emotionally Abused:     Physically Abused:     Sexually Abused:        Current Outpatient Medications   Medication Sig Dispense Refill    Caltrate 600+D Plus Minerals (CALTRATE) 600-800 MG-UNIT TABS tablet Take 2 tablets by mouth daily       warfarin (COUMADIN) 5 MG tablet Take as directed by [address] [place] [address] [facility].  [address] Tablets = 90 day supply 270 tablet 3    triamcinolone (NASACORT) 55 MCG/ACT nasal inhaler 1 spray by Nasal route daily In each nostril      montelukast (SINGULAIR) 10 MG tablet Take 10 mg by mouth daily      alendronate (FOSAMAX) 70 MG tablet Take 70 mg by mouth every 28 days       metFORMIN (GLUCOPHAGE) 500 MG tablet Take 500 mg by mouth 2 times daily (with meals)   0    lisinopril (PRINIVIL;ZESTRIL) 5 MG tablet Take 5 mg by mouth daily Indications: Diabetes, Raised Blood Pressure      atorvastatin (LIPITOR) 20 MG tablet Take 20 mg by mouth daily Indications: Blood Cholesterol Abnormal      Albuterol Sulfate (VENTOLIN HFA IN) Inhale 2 puffs into the lungs every 4 hours as needed Indications: Asthma       Acetaminophen (TYLENOL PO) Take 1,000 mg by mouth as needed Indications: Pain Don't take more than 3,000 mg per day. No current facility-administered medications for this visit. Review of Systems -     General ROS: negative  Psychological ROS: negative  Hematological and Lymphatic ROS: No history of blood clots or bleeding disorder. Respiratory ROS: no cough, shortness of breath, or wheezing  Cardiovascular ROS: no chest pain or dyspnea on exertion  Gastrointestinal ROS: negative  Genito-Urinary ROS: no dysuria, trouble voiding, or hematuria  Musculoskeletal ROS: negative  Neurological ROS: no TIA or stroke symptoms  Dermatological ROS: negative      Blood pressure 112/75, pulse 86, height 5' 4\" (1.626 m), weight 223 lb (101.2 kg).         Physical Examination:    General appearance - alert, well appearing, and in no distress  Mental status - alert, oriented to person, place, and time  Neck - supple, no significant adenopathy, no JVD, or carotid bruits  Chest - clear to auscultation, no wheezes, rales or rhonchi, symmetric air entry  Heart - normal rate, regular rhythm, normal S1, S2, no murmurs, rubs, clicks or gallops  Abdomen - soft, nontender, nondistended, no masses or organomegaly  Neurological - alert, oriented, normal speech, no focal findings or movement disorder noted  Musculoskeletal - no joint tenderness, deformity or swelling  Extremities - peripheral pulses normal, no pedal edema, no clubbing or cyanosis  Skin - normal coloration and turgor, no rashes, no suspicious skin lesions noted    Lab  No results for input(s): CKTOTAL, CKMB, CKMBINDEX, TROPONINI in the last 72 hours. CBC:   Lab Results   Component Value Date    WBC 4.8 05/28/2021    RBC 4.52 05/28/2021    HGB 13.0 05/28/2021    HGB 13.1 05/28/2021    HCT 39.0 05/28/2021    HCT 39.3 05/28/2021    MCV 87.0 05/28/2021    MCH 29.0 05/28/2021    MCHC 33.3 05/28/2021    RDW 16.1 05/28/2021     05/28/2021    MPV 10.6 11/12/2020     BMP:    Lab Results   Component Value Date     05/28/2021    K 4.3 05/28/2021    K 4.0 11/12/2020     05/28/2021    CO2 26 05/28/2021    BUN 15 05/28/2021    LABALBU 4.4 05/28/2021    CREATININE 0.94 05/28/2021    CALCIUM 9.40 05/28/2021    GFRAA >60 07/08/2020    LABGLOM 90 11/12/2020    GLUCOSE 114 05/28/2021     Hepatic Function Panel:    Lab Results   Component Value Date    ALKPHOS 88 05/28/2021    ALT 28 05/28/2021    AST 21 05/28/2021    PROT 7.3 05/28/2021    PROT 7.6 12/29/2016    BILITOT 0.5 05/28/2021    BILIDIR <0.2 01/22/2019    IBILI 0.6 12/29/2016    LABALBU 4.4 05/28/2021     Magnesium:    No results found for: MG  Warfarin PT/INR:    No components found for: PTPATWAR,  PTINRWAR  HgBA1c:    Lab Results   Component Value Date    LABA1C 6.1 09/10/2013     FLP:    Lab Results   Component Value Date    TRIG 93 05/28/2021    HDL 50 05/28/2021    LDLCALC 174 12/29/2016    LDLDIRECT 63 05/28/2021     TSH:    Lab Results   Component Value Date    TSH 1.65 07/08/2020     Echo  SUMMARY:    Left ventricle: There is moderate hypokinesis of the entire septum. Size was normal.  Systolic function was at the lower limits of normal by Teichholz. Ejection  fraction was estimated in the range of 50 % to 55 %. This study was inadequate for the evaluation of regional wall motion.   Wall thickness was at the upper limits of normal.  Doppler parameters were consistent with abnormal left ventricular relaxation  (grade 1 diastolic dysfunction). Right ventricle: The ventricle was mildly to moderately dilated. Systolic function was moderately to markedly reduced. Right atrium:  The atrium was mildly dilated. Tricuspid valve: There was mild regurgitation. Inferior vena cava, hepatic veins: The respirophasic change in diameter was less than 50%. Prepared and signed by    Chitra Rizzo MD    EKG   Sinus bradycardia  Otherwise normal ECG  When compared with ECG of 10-SEP-2013 07:00,  Ventricular rate has decreased BY 37 BPM  ST no longer elevated in Anterior leads  T wave inversion less evident in Anterior leads  QT has shortened  Confirmed by Fidelina Kidd MD, Kevin Villatoro ([id number])    Nuc stress Okay    EKG 8/14/14-Sinus  Rhythm   -RSR(V1) -nondiagnostic. PROBABLY NORMAL    2/12/14-ekg-Sinus  Bradycardia   -RSR(V1) -nondiagnostic. PROBABLY NORMAL      CONCLUSION:  1. This is a normal sinus rhythm with average heart rate of 76 beats  per minute ranging from  beats per minute. 2. No significant pause of more than 1.4 seconds noted. 3. Rare ventricular ectopic beats, all isolated. Rare  supraventricular ectopic beats, isolated and couplets. 4. No other form of arrhythmia noted. No significant bradyarrhythmia  noted as well.          Aimee Chowdhury M.D.  Freddie Marcum  D: 12/31/2015 17:44     EKG 10/13/16  Sinus  Rhythm   -Incomplete right bundle branch block. ABNORMAL     10/16/18  Sinus  Rhythm  -With rate variation   cv = 11.  -Incomplete right bundle branch block. ABNORMAL    EKG 6/13/19   Sinus  Rhythm   -Incomplete right bundle branch block. ABNORMAL     ekg 7/6/2020  Sinus  Rhythm   -Incomplete right bundle branch block. ABNORMAL     Assessment     Diagnosis Orders   1.  Sinus bradycardia -  and pauses only in the night range from 2.1 to 3.7 sec pause-pat Dxed with TERESA 2013 and cpap- Holter monitor after CPAP got better     2. Dyslipidemia     3. Abnormal EKG     4. Abnormal echocardiogram EF 55%, RVE and RV function reduced post PE         Past admission Dx  IMPRESSION:   1. Bilateral pulmonary emboli - recurrent. 2. Incomplete right bundle branch block and EKG abnormalities   secondary to her pulmonary emboli. 3. Mildly elevated troponin secondary to her pulmonary emboli    NO FHX of premature CAD    Father  at 45 yrs in his sleep-stated ETOH related and liver problem from ETOH    Plan   The  Current meds and labs reviewed    Abn holter with 2.1 tioo 3.7 sec pause- resolved and HR got better after stopping lopressor and using CPAP-its seems to be TERESA related  Mckenzie Smith already Dxed with TERESA in   Was on CPAP and then the holter 48  Post capa was good with no pause  Pause resolved after CPAP  D/w the pat at length    Sinus Bradycardia   with no symptom and abn holter  Off   atenolol 12.5 po qd and oncpap  Got better  NO definite HX of HTN    HX of DVT and PE - need life Long coumadin    hypercoagulable study- negative    Continue the current treatment and with constant vigilance to changes in symptoms and also any potential side effects. Return for care or seek medical attention immediately if symptoms got worse and/or develop new symptoms. Asa 81mg po qd    Repeat echo in for RV function-  And pre op- next visit- RV size regressessed  D/w the pat the plan of care and the risk involving surgey  Off pravastatin as lipids are okay and trop related to PE    On lisinopril for DM  On statin low dose for hLP with DM    D/w the pat plan of care  Encouraged to cont Cpap    Stable and doing well    Discussed use, benefit, and side effects of prescribed medications. All patient questions answered. Pt voiced understanding. Instructed to continue current medications, diet and exercise. Continue risk factor modification and medical management. Patient agreed with treatment plan. Follow up as directed.       RTC in   1 year    Christel Saravia, [facility]

## 2021-07-24 ENCOUNTER — HOSPITAL ENCOUNTER (EMERGENCY)
Age: 56
Discharge: HOME | End: 2021-07-24
Payer: COMMERCIAL

## 2021-07-24 VITALS
OXYGEN SATURATION: 97 % | SYSTOLIC BLOOD PRESSURE: 129 MMHG | RESPIRATION RATE: 16 BRPM | DIASTOLIC BLOOD PRESSURE: 70 MMHG | HEART RATE: 77 BPM | TEMPERATURE: 97.6 F

## 2021-07-24 DIAGNOSIS — M70.61 TROCHANTERIC BURSITIS OF RIGHT HIP: Primary | ICD-10-CM

## 2021-07-24 PROCEDURE — 96372 THER/PROPH/DIAG INJ SC/IM: CPT

## 2021-07-24 PROCEDURE — 99213 OFFICE O/P EST LOW 20 MIN: CPT

## 2021-07-24 PROCEDURE — 99213 OFFICE O/P EST LOW 20 MIN: CPT | Performed by: NURSE PRACTITIONER

## 2021-07-24 PROCEDURE — 6360000002 HC RX W HCPCS: Performed by: NURSE PRACTITIONER

## 2021-07-24 RX ORDER — METHYLPREDNISOLONE ACETATE 80 MG/ML
80 INJECTION, SUSPENSION INTRA-ARTICULAR; INTRALESIONAL; INTRAMUSCULAR; SOFT TISSUE ONCE
Status: COMPLETED | OUTPATIENT
Start: 2021-07-24 | End: 2021-07-24

## 2021-07-24 RX ORDER — PREDNISONE 20 MG/1
20 TABLET ORAL 2 TIMES DAILY
Qty: 10 TABLET | Refills: 0 | Status: SHIPPED | OUTPATIENT
Start: 2021-07-24 | End: 2021-07-29

## 2021-07-24 RX ADMIN — METHYLPREDNISOLONE ACETATE 80 MG: 80 INJECTION, SUSPENSION INTRA-ARTICULAR; INTRALESIONAL; INTRAMUSCULAR; SOFT TISSUE at 15:44

## 2021-07-24 ASSESSMENT — ENCOUNTER SYMPTOMS
NAUSEA: 0
SHORTNESS OF BREATH: 0
RHINORRHEA: 0
DIARRHEA: 0
SORE THROAT: 0
VOMITING: 0
COUGH: 0
CHEST TIGHTNESS: 0

## 2021-07-24 NOTE — ED PROVIDER NOTES
[facility]  Urgent Care Encounter       CHIEF COMPLAINT       Chief Complaint   Patient presents with    Hip Pain     right       Nurses Notes reviewed and I agree except as noted in the HPI. HISTORY OF PRESENT ILLNESS   Leana Ganser is a 64 y.o. female who presents to the [facility] urgent care for evaluation of right hip pain. She reports the pain started roughly 7 days ago. She reports no injury. She denies radiation of pain. She denies incontinence of stool or bowel. She denies saddle paresthesia. The history is provided by the patient. No  was used. REVIEW OF SYSTEMS     Review of Systems   Constitutional: Negative for activity change, appetite change, chills, fatigue and fever. HENT: Negative for ear discharge, ear pain, rhinorrhea and sore throat. Respiratory: Negative for cough, chest tightness and shortness of breath. Cardiovascular: Negative for chest pain. Gastrointestinal: Negative for diarrhea, nausea and vomiting. Genitourinary: Negative for dysuria. Musculoskeletal: Positive for arthralgias. Skin: Negative for rash. Allergic/Immunologic: Negative for environmental allergies and food allergies. Neurological: Negative for dizziness and headaches. PAST MEDICAL HISTORY         Diagnosis Date    Asthma     Depression     Diverticulitis     History of deep vein thrombophlebitis of lower extremity     Osteoarthritis 04/2020    Osteopenia     PE (pulmonary embolism)     x2    Plantar fasciitis, right 3/2016    Sleep apnea     tested Dec 2013 needs CPAP yet    Type 2 diabetes mellitus without complication ([facility] [address] 43/50/[id number]       SURGICALHISTORY     Patient  has a past surgical history that includes Vena Cava Filter Placement; Ankle surgery; other surgical history; Toe Surgery (06/25/2019); and Dilation and curettage of uterus (N/A, 8/15/2019).     CURRENT MEDICATIONS       Previous Medications    ACETAMINOPHEN (TYLENOL PO)    Take 1,000 mg by mouth as needed Indications: Pain Don't take more than 3,000 mg per day. ALBUTEROL SULFATE (VENTOLIN HFA IN)    Inhale 2 puffs into the lungs every 4 hours as needed Indications: Asthma     ALENDRONATE (FOSAMAX) 70 MG TABLET    Take 70 mg by mouth every 28 days     ATORVASTATIN (LIPITOR) 20 MG TABLET    Take 20 mg by mouth daily Indications: Blood Cholesterol Abnormal    CALTRATE 600+D PLUS MINERALS (CALTRATE) 600-800 MG-UNIT TABS TABLET    Take 2 tablets by mouth daily     LISINOPRIL (PRINIVIL;ZESTRIL) 5 MG TABLET    Take 5 mg by mouth daily Indications: Diabetes, Raised Blood Pressure    METFORMIN (GLUCOPHAGE) 500 MG TABLET    Take 500 mg by mouth 2 times daily (with meals)     MONTELUKAST (SINGULAIR) 10 MG TABLET    Take 10 mg by mouth daily    TRIAMCINOLONE (NASACORT) 55 MCG/ACT NASAL INHALER    1 spray by Nasal route daily In each nostril    WARFARIN (COUMADIN) 5 MG TABLET    Take as directed by [facility]. #270 Tablets = 90 day supply       ALLERGIES     Patient is has No Known Allergies. Patients   Immunization History   Administered Date(s) Administered    Influenza Virus Vaccine 09/11/2013, 10/10/2014, 10/05/2015, 10/15/2019    Influenza, Marion Bryant, 6 mo and older, IM, PF (Flulaval, Fluarix) 10/17/2018    Influenza, Quadv, IM, (6 mo and older Fluzone, Flulaval, Fluarix and 3 yrs and older Afluria) 10/12/2016    Influenza, Quadv, IM, PF (6 mo and older Fluzone, Flulaval, Fluarix, and 3 yrs and older Afluria) 11/30/2017, 10/01/2020    Pneumococcal Polysaccharide ([id number]) 09/12/2013    Tdap (Boostrix, Adacel) 09/10/2013       FAMILY HISTORY     Patient's family history includes Cancer in her mother; Diabetes in her brother; High Blood Pressure in her sister; High Cholesterol in her sister; Stroke in her maternal grandmother. SOCIAL HISTORY     Patient  reports that she has never smoked.  She has never used smokeless tobacco. She reports current alcohol use. She reports that she does not use drugs. PHYSICAL EXAM     ED TRIAGE VITALS  BP: 135/68, Temp: 97.6 °F (36.4 °C), Pulse: 79, Resp: 16, SpO2: 97 %,Estimated body mass index is 38.28 kg/m² as calculated from the following:    Height as of 7/1/21: 5' 4\" (1.626 m). Weight as of 7/1/21: 223 lb (101.2 kg). ,No LMP recorded. Patient is postmenopausal.    Physical Exam  Vitals and nursing note reviewed. Constitutional:       General: She is not in acute distress. Appearance: Normal appearance. She is not ill-appearing, toxic-appearing or diaphoretic. HENT:      Head: Normocephalic. Right Ear: Ear canal and external ear normal.      Left Ear: Ear canal and external ear normal.      Nose: Nose normal. No congestion or rhinorrhea. Mouth/Throat:      Mouth: Mucous membranes are moist.      Pharynx: Oropharynx is clear. No oropharyngeal exudate or posterior oropharyngeal erythema. Cardiovascular:      Rate and Rhythm: Normal rate. Pulses: Normal pulses. Pulmonary:      Effort: Pulmonary effort is normal. No respiratory distress. Breath sounds: No stridor. No wheezing or rhonchi. Abdominal:      General: Abdomen is flat. Bowel sounds are normal.      Palpations: Abdomen is soft. Musculoskeletal:         General: No swelling. Normal range of motion. Cervical back: Normal range of motion. Right hip: Tenderness present. Legs:    Neurological:      General: No focal deficit present. Mental Status: She is alert and oriented to person, place, and time. Psychiatric:         Mood and Affect: Mood normal.         Behavior: Behavior normal.         DIAGNOSTIC RESULTS     Labs:No results found for this visit on 07/24/21.     IMAGING:    No orders to display         EKG: None      URGENT CARE COURSE:     Vitals:    07/24/21 1529   BP: 135/68   Pulse: 79   Resp: 16   Temp: 97.6 °F (36.4 °C)   TempSrc: Tympanic   SpO2: 97%       Medications   methylPREDNISolone acetate (DEPO-MEDROL) injection 80 mg (has no administration in time range)            PROCEDURES:  None    FINAL IMPRESSION      1. Trochanteric bursitis of right hip          DISPOSITION/ PLAN     Patient seen and evaluated for right hip pain. Assessment consistent with trochanteric bursitis of the right hip. She is medicated with a injection of Depo-Medrol. She is prescribed 5-day course of prednisone. She reports being on warfarin and unable to take NSAIDs. She is instructed to follow-up with her PCP with continued pain for further management. She is agreeable to the above plan denies questions or concerns this time.       PATIENT REFERRED TO:  LUIZ Kolb CNP  [address] [facility] [address]      DISCHARGE MEDICATIONS:  New Prescriptions    No medications on file       Discontinued Medications    No medications on file       Current Discharge Medication List          LUIZ Green CNP    (Please note that portions of this note were completed with a voice recognition program. Efforts were made to edit the dictations but occasionally words are mis-transcribed.)           LUIZ Green CNP  07/24/21 0404

## 2021-07-26 ENCOUNTER — TELEPHONE (OUTPATIENT)
Dept: PHARMACY | Age: 56
End: 2021-07-26

## 2021-07-26 NOTE — TELEPHONE ENCOUNTER
Spoke with patient - she started the prednisone PO on 07/25/21 and had the steroid shot on 07/14/21 at the urgent care. Patient takes Coumadin 15 mg MWF, 10 mg TTHSS. Instructed patient to take Coumadin 5 mg x 1 today, 7/26 (~16.7% decrease over the 5 day steroid treatment therapy). Will keep clinic appt for 7/27 at 3:20.     Estephanie Koroma PharmD, BCPS  7/26/2021  9:54 AM

## 2021-07-27 ENCOUNTER — HOSPITAL ENCOUNTER (OUTPATIENT)
Dept: PHARMACY | Age: 56
Setting detail: THERAPIES SERIES
Discharge: HOME | End: 2021-07-27
Payer: COMMERCIAL

## 2021-07-27 ENCOUNTER — APPOINTMENT (OUTPATIENT)
Dept: PHARMACY | Age: 56
End: 2021-07-27
Payer: COMMERCIAL

## 2021-07-27 DIAGNOSIS — Z51.81 ENCOUNTER FOR THERAPEUTIC DRUG MONITORING: ICD-10-CM

## 2021-07-27 DIAGNOSIS — Z79.01 ANTICOAGULATED ON COUMADIN: Primary | ICD-10-CM

## 2021-07-27 LAB — POC INR: 2 (ref 0.8–1.2)

## 2021-07-27 PROCEDURE — 36416 COLLJ CAPILLARY BLOOD SPEC: CPT

## 2021-07-27 PROCEDURE — 85610 PROTHROMBIN TIME: CPT

## 2021-07-27 PROCEDURE — 99211 OFF/OP EST MAY X REQ PHY/QHP: CPT

## 2021-07-27 NOTE — PROGRESS NOTES
Medication Management [address]  [phone] (phone)  [phone] (fax)    Ms. Veronica Blanchard is a 64 y.o.  female with history of PE who presents today for anticoagulation monitoring and adjustment. Patient verifies current dosing regimen and tablet strength. No missed or extra doses. - Took only 5 mg of Coumadin of yesterday after speaking with pharmacist since starting prednisone. Patient denies s/s bleeding/bruising/swelling/SOB/chest pain - bruise on the front of her leg  No blood in urine or stool. No dietary changes. No changes in medication/OTC agents/Herbals. - She was inquiring about trying DSK-W over the counter. I advised her not to try this herbal supplement. It has willow bark as one of the listed ingredients which further increases the risk of bleeding. No change in alcohol use or tobacco use. No change in activity level. - Activity level is up and down as she keeps getting laid off and then brought back to work. She stated that it is all dependent on whether or not the chip for the cars is available. Patient denies headaches/dizziness/lightheadedness/falls. No vomiting/diarrhea or acute illness. No Procedures scheduled in the future at this time. Assessment:   Lab Results   Component Value Date    INR 2.00 (H) 07/27/2021    INR 2.50 (H) 06/22/2021    INR 2.40 (H) 05/25/2021     INR therapeutic   Recent Labs     07/27/21  1546   INR 2.00*     Plan:  Continue Coumadin 15 mg MWF, 10 mg all other days. Recheck INR in 5 week(s). Patient reminded to call the Anticoagulation Clinic with any signs or symptoms of bleeding or with any medication changes. Patient given instructions utilizing the teach back method. After visit summary printed and reviewed with patient. Discharged ambulatory in no apparent distress.     For Pharmacy Admin Tracking Only   Time Spent (min): [address], PharmD, BCPS  7/27/2021  4:01 PM

## 2021-07-29 ENCOUNTER — TELEPHONE (OUTPATIENT)
Dept: PHARMACY | Age: 56
End: 2021-07-29

## 2021-07-29 ENCOUNTER — APPOINTMENT (OUTPATIENT)
Dept: GENERAL RADIOLOGY | Age: 56
End: 2021-07-29
Payer: COMMERCIAL

## 2021-07-29 ENCOUNTER — HOSPITAL ENCOUNTER (EMERGENCY)
Age: 56
Discharge: HOME | End: 2021-07-29
Payer: COMMERCIAL

## 2021-07-29 VITALS
RESPIRATION RATE: 16 BRPM | BODY MASS INDEX: 38.07 KG/M2 | WEIGHT: 223 LBS | HEART RATE: 85 BPM | SYSTOLIC BLOOD PRESSURE: 135 MMHG | HEIGHT: 64 IN | TEMPERATURE: 98.8 F | DIASTOLIC BLOOD PRESSURE: 80 MMHG | OXYGEN SATURATION: 97 %

## 2021-07-29 DIAGNOSIS — M70.61 GREATER TROCHANTERIC BURSITIS OF RIGHT HIP: Primary | ICD-10-CM

## 2021-07-29 DIAGNOSIS — M54.31 SCIATICA OF RIGHT SIDE: ICD-10-CM

## 2021-07-29 LAB — INR BLD: 1.94 (ref 0.85–1.13)

## 2021-07-29 PROCEDURE — 85610 PROTHROMBIN TIME: CPT

## 2021-07-29 PROCEDURE — 99284 EMERGENCY DEPT VISIT MOD MDM: CPT

## 2021-07-29 PROCEDURE — 72100 X-RAY EXAM L-S SPINE 2/3 VWS: CPT

## 2021-07-29 PROCEDURE — 36415 COLL VENOUS BLD VENIPUNCTURE: CPT

## 2021-07-29 PROCEDURE — 73502 X-RAY EXAM HIP UNI 2-3 VIEWS: CPT

## 2021-07-29 RX ORDER — HYDROCODONE BITARTRATE AND ACETAMINOPHEN 5; 325 MG/1; MG/1
1 TABLET ORAL EVERY 6 HOURS PRN
Qty: 12 TABLET | Refills: 0 | Status: SHIPPED | OUTPATIENT
Start: 2021-07-29 | End: 2021-08-01

## 2021-07-29 ASSESSMENT — PAIN DESCRIPTION - ORIENTATION: ORIENTATION: RIGHT

## 2021-07-29 ASSESSMENT — ENCOUNTER SYMPTOMS
SORE THROAT: 0
EYE PAIN: 0
RHINORRHEA: 0
WHEEZING: 0
COUGH: 0
DIARRHEA: 0
COLOR CHANGE: 0
VOMITING: 0
BACK PAIN: 1
EYE ITCHING: 0
SHORTNESS OF BREATH: 0
ABDOMINAL PAIN: 0
EYE DISCHARGE: 0
NAUSEA: 0

## 2021-07-29 ASSESSMENT — PAIN DESCRIPTION - LOCATION: LOCATION: LEG

## 2021-07-29 ASSESSMENT — PAIN DESCRIPTION - DESCRIPTORS: DESCRIPTORS: SHOOTING;SHARP

## 2021-07-29 ASSESSMENT — PAIN DESCRIPTION - PROGRESSION: CLINICAL_PROGRESSION: GRADUALLY WORSENING

## 2021-07-29 ASSESSMENT — PAIN DESCRIPTION - ONSET: ONSET: ON-GOING

## 2021-07-29 ASSESSMENT — PAIN DESCRIPTION - FREQUENCY: FREQUENCY: CONTINUOUS

## 2021-07-29 ASSESSMENT — PAIN DESCRIPTION - PAIN TYPE: TYPE: ACUTE PAIN

## 2021-07-29 ASSESSMENT — PAIN SCALES - GENERAL: PAINLEVEL_OUTOF10: 7

## 2021-07-29 NOTE — TELEPHONE ENCOUNTER
Noted patient was in ER today for greater trochanteric bursitis of right hip. No provider notes, yet. Noted patient called this clinic about starting Demarcus Hasting.  Per 7/26 entry, would be finishing 5 day course of Prednisone 20 mg BID today. INR in ER 1.94.   Message forwarded to clinic pharmacist.

## 2021-07-29 NOTE — TELEPHONE ENCOUNTER
Patient called and left a message at  wanting to know if she is able to take Phillip García.  Returned patient's call and informed her that we do not expect a significant reaction between Coumadin and Norco, and she is okay to take the Phillip García as prescribed by her physician. She states that she only has 12 tablets and will only take it if she needs to. Added to notes for next appointment.

## 2021-07-29 NOTE — ED PROVIDER NOTES
[facility] [address] COMPLAINT       Chief Complaint   Patient presents with    Leg Pain     RIGHT       Nurses Notes reviewed and I agree except as notedin the HPI. HISTORY OF PRESENT ILLNESS    Elnita Osler is a 64 y.o. female who presents complains of right hip pain in right sciatica this been going on for about 5 days. The patient has a history of sciatica she states this feels like sciatica she had in the past.  She went to urgent care was placed on prednisone. The patient is is on Coumadin and had an INR of 2.0 on Tuesday. The patient's been on steroids for last several days. Today is her last day for the steroids. She denies any bowel bladder incontinence. Patient has no history of aneurysms. Location/Symptom: Right hip pain and right leg pain  Timing/Onset: 07/24/2021   Context/Setting: homem  Quality: ache  Duration: constant  Modifying Factors: none  Severity: 5    REVIEW OF SYSTEMS     Review of Systems   Constitutional: Negative for activity change, appetite change, chills and fever. HENT: Negative for congestion, ear pain, rhinorrhea and sore throat. Eyes: Negative for pain, discharge and itching. Respiratory: Negative for cough, shortness of breath and wheezing. Cardiovascular: Negative for chest pain. Gastrointestinal: Negative for abdominal pain, diarrhea, nausea and vomiting. Genitourinary: Negative for difficulty urinating and dysuria. Musculoskeletal: Positive for back pain. Negative for arthralgias and myalgias. Right hip pain   Skin: Negative for color change and rash. Neurological: Negative for dizziness, seizures, light-headedness and headaches. Psychiatric/Behavioral: Negative for agitation, confusion, self-injury and suicidal ideas. All other systems reviewed and are negative.        PAST MEDICAL HISTORY    has a past medical history of Asthma, Depression, Diverticulitis, History of deep vein thrombophlebitis of lower extremity, Osteoarthritis, Osteopenia, PE (pulmonary embolism), Plantar fasciitis, right, Sleep apnea, and Type 2 diabetes mellitus without complication ([facility] [address] SURGICAL HISTORY      has a past surgical history that includes Vena Cava Filter Placement; Ankle surgery; other surgical history; Toe Surgery (06/25/2019); and Dilation and curettage of uterus (N/A, 8/15/2019). CURRENT MEDICATIONS       Discharge Medication List as of 7/29/2021  9:02 AM      CONTINUE these medications which have NOT CHANGED    Details   predniSONE (DELTASONE) 20 MG tablet Take 1 tablet by mouth 2 times daily for 5 days, Disp-10 tablet, R-0Normal      Caltrate 600+D Plus Minerals (CALTRATE) 600-800 MG-UNIT TABS tablet Take 2 tablets by mouth daily Historical Med      warfarin (COUMADIN) 5 MG tablet Take as directed by [facility]. #270 Tablets = 90 day supply, Disp-270 tablet, R-3Please discard previous script that was sentNormal      triamcinolone (NASACORT) 55 MCG/ACT nasal inhaler 1 spray by Nasal route daily In each nostrilHistorical Med      montelukast (SINGULAIR) 10 MG tablet Take 10 mg by mouth dailyHistorical Med      alendronate (FOSAMAX) 70 MG tablet Take 70 mg by mouth every 28 days Historical Med      metFORMIN (GLUCOPHAGE) 500 MG tablet Take 500 mg by mouth 2 times daily (with meals) , R-0Historical Med      lisinopril (PRINIVIL;ZESTRIL) 5 MG tablet Take 5 mg by mouth daily Indications: Diabetes, Raised Blood PressureHistorical Med      atorvastatin (LIPITOR) 20 MG tablet Take 20 mg by mouth daily Indications: Blood Cholesterol Abnormal      Albuterol Sulfate (VENTOLIN HFA IN) Inhale 2 puffs into the lungs every 4 hours as needed Indications: Asthma Historical Med      Acetaminophen (TYLENOL PO) Take 1,000 mg by mouth as needed Indications: Pain Don't take more than 3,000 mg per day. Historical Med             ALLERGIES     has No Known Allergies.     HISTORY     She indicated that her mother is . She indicated that her father is . She indicated that her sister is alive. She indicated that her brother is . She indicated that the status of her maternal grandmother is unknown.   family history includes Cancer in her mother; Diabetes in her brother; High Blood Pressure in her sister; High Cholesterol in her sister; Stroke in her maternal grandmother. SOCIALHISTORY      reports that she has never smoked. She has never used smokeless tobacco. She reports current alcohol use. She reports that she does not use drugs. PHYSICAL EXAM     INITIAL VITALS:  height is 5' 4\" (1.626 m) and weight is 223 lb (101.2 kg). Her oral temperature is 98.8 °F (37.1 °C). Her blood pressure is 135/80 and her pulse is 85. Her respiration is 16 and oxygen saturation is 97%. Physical Exam  Vitals and nursing note reviewed. Constitutional:       Comments: Well Developed Well Nourished Appearing     HENT:      Head: Normocephalic and atraumatic. Right Ear: Tympanic membrane normal.      Left Ear: Tympanic membrane normal.   Eyes:      Pupils: Pupils are equal, round, and reactive to light. Cardiovascular:      Rate and Rhythm: Normal rate and regular rhythm. Pulses: Normal pulses. Heart sounds: Normal heart sounds. Pulmonary:      Effort: Pulmonary effort is normal. No respiratory distress. Breath sounds: Normal breath sounds. No wheezing. Abdominal:      General: Bowel sounds are normal. There is no distension. Palpations: Abdomen is soft. Tenderness: There is no abdominal tenderness. Comments: No pulsatile masses or any bruits. Musculoskeletal:      Cervical back: Normal range of motion and neck supple. Comments: Tenderness right paraspinous muscles in the right lumbar sacral spine on the right buttock and has good strength and sensation lower extremities. She is tender over greater trochanteric bursa. Right leg is not cool.   There is no it vascular issue. DIFFERENTIAL DIAGNOSIS:   Right leg pain secondary to greater trochanteric bursitis. Patient may have a sciatic component also. She has no evidence of any vascular issue on that leg. DIAGNOSTIC RESULTS     EKG: All EKG's are interpreted by the Emergency Department Physician who either signs or Co-signs this chart in the absence of a cardiologist.      RADIOLOGY: non-plain film images(s) such as CT, Ultrasound and MRI are read by the radiologist.  X-ray lumbar spine and right hip x-ray     XR LUMBAR SPINE (2-3 VIEWS) (Final result)  Result time 07/29/21 08:15:20  Final result by Michi Gutierrez MD (07/29/21 08:15:20)                Impression:     Worsening degenerative disc disease which is most pronounced at the L4-5 level. **This report has been created using voice recognition software. It may contain minor errors which are inherent in voice recognition technology. **     Final report electronically signed by Dr. Michi Gutierrez on 7/29/2021 8:15 AM            Narrative:    PROCEDURE: XR LUMBAR SPINE (2-3 VIEWS)     CLINICAL INFORMATION: right leg sciatica, right leg pain that worsened yesterday. COMPARISON: Lumbar spine x-rays 10/31/2018. TECHNIQUE: AP and lateral views of the lumbar spine. FINDINGS:     An IVC filter is in place. The lumbar vertebral bodies are normally aligned.  There are no compression fractures. Demetra Knuckles has been further height loss and mild worsening of the endplate sclerosis at the L4-5 level since the prior examination. There is mild height loss at L5-S1   which is similar. The other disc heights are maintained. There are mild facet degenerative changes at the L4-5 and L5-S1 levels. No suspicious osseous lesions are present.  Sacral struts are intact.  The sacroiliac joints are within appropriate limits.     No paraspinal abnormality is noted.                         XR HIP 2-3 VW W PELVIS RIGHT (Final result)  Result time 07/29/21 21:01:00  Final result by Rachel Gray MD (07/29/21 08:11:28)                Impression:        1. Degenerative change involving the right hip and sacroiliac joints. 2. No definite fracture or other bony abnormality noted. .           **This report has been created using voice recognition software. It may contain minor errors which are inherent in voice recognition technology. **     Final report electronically signed by  [facility] on 7/29/2021 8:11 AM            Narrative:    PROCEDURE: XR HIP 2-3 VW W PELVIS RIGHT     CLINICAL INFORMATION: Right hip pain. COMPARISON: Plain radiographs dated 23rd May 20 and left. TECHNIQUE: AP view of the pelvis. and 2 views of the right hip   FINDINGS:     There is degenerative change involving the right hip and sacroiliac joints. There is no definite fracture. There is no other bony abnormality noted.                       LABS:   Labs Reviewed   PROTIME-INR - Abnormal; Notable for the following components:       Result Value    INR 1.94 (*)     All other components within normal limits       EMERGENCY DEPARTMENT COURSE:   :    Vitals:    07/29/21 0726 07/29/21 1000   BP: 138/89 135/80   Pulse: 89 85   Resp: 16 16   Temp: 98.8 °F (37.1 °C)    TempSrc: Oral    SpO2: 97% 97%   Weight: 223 lb (101.2 kg)    Height: 5' 4\" (1.626 m)      Patient was seen history physical exam was performed. Case was staffed with Dr. Carlin Escobar. see disposition below    CRITICAL CARE:  None    CONSULTS:  None    PROCEDURES:  None    FINAL IMPRESSION      1. Greater trochanteric bursitis of right hip    2.  Sciatica of right side          DISPOSITION/PLAN   Discharge    PATIENT REFERRED TO:  [address] [place] [address]  [phone]  In 1 day        DISCHARGE MEDICATIONS:  Discharge Medication List as of 7/29/2021  9:02 AM      START taking these medications    Details   HYDROcodone-acetaminophen (NORCO) 5-325 MG per tablet Take 1 tablet by mouth every 6 hours as needed for Pain for up to 3 days. Intended supply: 3 days.  Take lowest dose possible to manage pain, Disp-12 tablet, R-0Print             (Please note that portions of this note were completed with a voice recognitionprogram.  Efforts were made to edit the dictations but occasionally words are mis-transcribed.)    Tracie Rhodes, [address] [place], [place]  07/29/21 [address] [place], [place]  07/29/21 5205

## 2021-07-29 NOTE — TELEPHONE ENCOUNTER
Noted. Ariadne Cm to patient earlier today about Norco. Addressed prednisone at previous visit on 7/27/21 and TC on 7/26. Patient was instructed to take Coumadin 5 mg x 1 dose (16.7% decrease over the 5 day steroid treatment therapy). Prednisone to finish today. No change at this time.

## 2021-08-23 ENCOUNTER — TELEPHONE (OUTPATIENT)
Dept: CARDIOLOGY CLINIC | Age: 56
End: 2021-08-23

## 2021-08-23 NOTE — TELEPHONE ENCOUNTER
Spoke to patient regarding JACKI screening results. Normal per Dr. Bob Zhang. Patient voiced understanding.

## 2021-08-31 ENCOUNTER — APPOINTMENT (OUTPATIENT)
Dept: PHARMACY | Age: 56
End: 2021-08-31
Payer: COMMERCIAL

## 2021-08-31 ENCOUNTER — HOSPITAL ENCOUNTER (OUTPATIENT)
Dept: PHARMACY | Age: 56
Setting detail: THERAPIES SERIES
Discharge: HOME | End: 2021-08-31
Payer: COMMERCIAL

## 2021-08-31 DIAGNOSIS — Z79.01 ANTICOAGULATED ON COUMADIN: Primary | ICD-10-CM

## 2021-08-31 DIAGNOSIS — I26.99 PULMONARY EMBOLISM, UNSPECIFIED CHRONICITY, UNSPECIFIED PULMONARY EMBOLISM TYPE, UNSPECIFIED WHETHER ACUTE COR PULMONALE PRESENT (HCC): ICD-10-CM

## 2021-08-31 DIAGNOSIS — Z51.81 ENCOUNTER FOR THERAPEUTIC DRUG MONITORING: ICD-10-CM

## 2021-08-31 LAB — POC INR: 2.7 (ref 0.8–1.2)

## 2021-08-31 PROCEDURE — 36416 COLLJ CAPILLARY BLOOD SPEC: CPT

## 2021-08-31 PROCEDURE — 85610 PROTHROMBIN TIME: CPT

## 2021-08-31 PROCEDURE — 99211 OFF/OP EST MAY X REQ PHY/QHP: CPT

## 2021-08-31 RX ORDER — LIDOCAINE 50 MG/G
PATCH TOPICAL
COMMUNITY
Start: 2021-08-12 | End: 2021-09-05 | Stop reason: SDUPTHER

## 2021-08-31 RX ORDER — OXYCODONE HYDROCHLORIDE AND ACETAMINOPHEN 5; 325 MG/1; MG/1
1 TABLET ORAL EVERY 6 HOURS PRN
COMMUNITY
Start: 2021-08-06 | End: 2022-05-19

## 2021-08-31 NOTE — PROGRESS NOTES
Medication Management [address]  [phone] (phone)  [phone] (fax)    Ms. Leana Ganser is a 64 y.o.  female with history of PE, per Dr. Mariela Devi referral, who presents today for Warfarin monitoring and adjustment (5 week visit). Patient verifies current dosing regimen and tablet strength. No missed or extra doses. Patient denies bleeding/swelling/SOB/chest pain. Has usual easy bruising. No blood in urine or stool. Dietary changes: had small amount of cranberry juice 2 weeks ago - first in years. Changes in medication/OTC agents/herbals: [place] was switched to Percocet. No change in alcohol use or tobacco use. Change in activity level: decreased - has been off work 1 month. Patient denies headaches/dizziness/lightheadedness/falls. No vomiting/diarrhea or acute illness. No procedures scheduled in the future at this time. Has appt. at [facility] today for right leg pain, especially lower leg. States Percocet doesn't help much. Has also used Tylenol - not together. Reminded to call this clinic if any procedure scheduled that requires stopping Coumadin. Went to ER 7/29 for right greater trochanteric bursitis - ordered Norco.  INR there 1.94. Assessment:   Lab Results   Component Value Date    INR 2.70 (H) 08/31/2021    INR 1.94 (H) 07/29/2021    INR 2.00 (H) 07/27/2021     INR therapeutic - goal 2-3. Recent Labs     08/31/21  1419   INR 2.70*       Plan:  POCT INR ordered/performed/result reviewed. Continue PO Coumadin 15 mg MWF, 10 mg TThSS. Recheck INR in 5 week(s). Patient reminded to call the Anticoagulation Clinic with any signs or symptoms of bleeding or with any medication changes. Patient given instructions utilizing the teach back method. After visit summary printed and reviewed with patient. Discharged ambulatory in no apparent distress, wearing mask, with cane.

## 2021-09-01 ENCOUNTER — TELEPHONE (OUTPATIENT)
Dept: PHARMACY | Age: 56
End: 2021-09-01

## 2021-09-01 NOTE — TELEPHONE ENCOUNTER
[facility] returned call and stated that procedure is not yet scheduled. Patient's next [facility] appointment not until October. Will put on [facility] calendar to follow up.

## 2021-09-01 NOTE — TELEPHONE ENCOUNTER
Received fax from [employer] that patient will be having a lumbar epidural and will need to hold Coumadin x 5 days. However, date of procedure. was not mentioned. Called [facility] and left message asking them to call us back to let us know when this procedure will be.

## 2021-09-05 ENCOUNTER — APPOINTMENT (OUTPATIENT)
Dept: GENERAL RADIOLOGY | Age: 56
End: 2021-09-05
Payer: OTHER MISCELLANEOUS

## 2021-09-05 ENCOUNTER — HOSPITAL ENCOUNTER (EMERGENCY)
Age: 56
Discharge: HOME | End: 2021-09-05
Attending: EMERGENCY MEDICINE
Payer: OTHER MISCELLANEOUS

## 2021-09-05 VITALS
BODY MASS INDEX: 37.9 KG/M2 | OXYGEN SATURATION: 100 % | TEMPERATURE: 98.8 F | SYSTOLIC BLOOD PRESSURE: 130 MMHG | DIASTOLIC BLOOD PRESSURE: 89 MMHG | RESPIRATION RATE: 18 BRPM | HEART RATE: 73 BPM | WEIGHT: 222 LBS | HEIGHT: 64 IN

## 2021-09-05 DIAGNOSIS — S16.1XXA ACUTE STRAIN OF NECK MUSCLE, INITIAL ENCOUNTER: ICD-10-CM
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
## 2021-09-05 DIAGNOSIS — S39.012A STRAIN OF LUMBAR REGION, INITIAL ENCOUNTER: Primary | ICD-10-CM

## 2021-09-05 PROCEDURE — 72100 X-RAY EXAM L-S SPINE 2/3 VWS: CPT

## 2021-09-05 PROCEDURE — 6370000000 HC RX 637 (ALT 250 FOR IP): Performed by: EMERGENCY MEDICINE

## 2021-09-05 PROCEDURE — 99283 EMERGENCY DEPT VISIT LOW MDM: CPT

## 2021-09-05 RX ORDER — LIDOCAINE 50 MG/G
1 PATCH TOPICAL DAILY
Qty: 30 PATCH | Refills: 0 | Status: SHIPPED | OUTPATIENT
Start: 2021-09-05

## 2021-09-05 RX ORDER — LIDOCAINE 4 G/G
1 PATCH TOPICAL DAILY
Status: DISCONTINUED | OUTPATIENT
Start: 2021-09-05 | End: 2021-09-05 | Stop reason: HOSPADM

## 2021-09-05 RX ORDER — ACETAMINOPHEN 500 MG
1000 TABLET ORAL ONCE
Status: COMPLETED | OUTPATIENT
Start: 2021-09-05 | End: 2021-09-05

## 2021-09-05 RX ADMIN — ACETAMINOPHEN 1000 MG: 500 TABLET ORAL at 14:31

## 2021-09-05 ASSESSMENT — ENCOUNTER SYMPTOMS
SHORTNESS OF BREATH: 0
DIARRHEA: 0
ABDOMINAL PAIN: 0
VOMITING: 0
BLOOD IN STOOL: 0
VOICE CHANGE: 0
TROUBLE SWALLOWING: 0
COUGH: 0
NAUSEA: 0
CHEST TIGHTNESS: 0
BACK PAIN: 1

## 2021-09-05 ASSESSMENT — PAIN DESCRIPTION - ONSET: ONSET: ON-GOING

## 2021-09-05 ASSESSMENT — PAIN DESCRIPTION - ORIENTATION: ORIENTATION: LEFT;LOWER

## 2021-09-05 ASSESSMENT — PAIN SCALES - GENERAL
PAINLEVEL_OUTOF10: 7
PAINLEVEL_OUTOF10: 7

## 2021-09-05 ASSESSMENT — PAIN DESCRIPTION - FREQUENCY: FREQUENCY: CONTINUOUS

## 2021-09-05 ASSESSMENT — PAIN DESCRIPTION - PROGRESSION: CLINICAL_PROGRESSION: GRADUALLY WORSENING

## 2021-09-05 ASSESSMENT — PAIN DESCRIPTION - LOCATION: LOCATION: BACK;NECK

## 2021-09-05 ASSESSMENT — PAIN DESCRIPTION - PAIN TYPE: TYPE: ACUTE PAIN

## 2021-09-05 ASSESSMENT — PAIN DESCRIPTION - DESCRIPTORS: DESCRIPTORS: ACHING;SORE

## 2021-09-05 NOTE — ED PROVIDER NOTES
CURRENT MEDICATIONS       Previous Medications    ACETAMINOPHEN (TYLENOL PO)    Take 1,000 mg by mouth as needed Indications: Pain Don't take more than 3,000 mg per day, including what's in Percocet. ALBUTEROL SULFATE (VENTOLIN HFA IN)    Inhale 2 puffs into the lungs every 4 hours as needed Indications: Asthma     ALENDRONATE (FOSAMAX) 70 MG TABLET    Take 70 mg by mouth every 28 days     ATORVASTATIN (LIPITOR) 20 MG TABLET    Take 20 mg by mouth daily Indications: Blood Cholesterol Abnormal    CALTRATE 600+D PLUS MINERALS (CALTRATE) 600-800 MG-UNIT TABS TABLET    Take 2 tablets by mouth daily     LISINOPRIL (PRINIVIL;ZESTRIL) 5 MG TABLET    Take 5 mg by mouth daily Indications: Diabetes, Raised Blood Pressure    METFORMIN (GLUCOPHAGE) 500 MG TABLET    Take 500 mg by mouth 2 times daily (with meals)     MONTELUKAST (SINGULAIR) 10 MG TABLET    Take 10 mg by mouth daily    OXYCODONE-ACETAMINOPHEN (PERCOCET) 5-325 MG PER TABLET    Take by mouth See Admin Instructions. Indications: Pain     TRIAMCINOLONE (NASACORT) 55 MCG/ACT NASAL INHALER    1 spray by Nasal route daily In each nostril    WARFARIN (COUMADIN) 5 MG TABLET    Take as directed by [facility]. #270 Tablets = 90 day supply       ALLERGIES     Patient has no known allergies. FAMILY HISTORY       Family History   Problem Relation Age of Onset    Cancer Mother         breast    High Blood Pressure Sister     High Cholesterol Sister     Diabetes Brother     Stroke Maternal Grandmother         SOCIAL HISTORY       Social History     Socioeconomic History    Marital status: Single     Spouse name: Not on file    Number of children: Not on file    Years of education: Not on file    Highest education level: Not on file   Occupational History    Not on file   Tobacco Use    Smoking status: Never Smoker    Smokeless tobacco: Never Used   Vaping Use    Vaping Use: Never used   Substance and Sexual Activity    Alcohol use:  Yes Comment: Priyanka Clark Drug use: No    Sexual activity: Not Currently     Partners: Male   Other Topics Concern    Not on file   Social History Narrative    Not on file     Social Determinants of Health     Financial Resource Strain:     Difficulty of Paying Living Expenses:    Food Insecurity:     Worried About Running Out of Food in the Last Year:     [address] in the Last Year:    Transportation Needs:     Lack of Transportation (Medical):  Lack of Transportation (Non-Medical):    Physical Activity:     Days of Exercise per Week:     Minutes of Exercise per Session:    Stress:     Feeling of Stress :    Social Connections:     Frequency of Communication with Friends and Family:     Frequency of Social Gatherings with Friends and Family:     Attends Spiritism Services:     Active Member of Clubs or Organizations:     Attends Club or Organization Meetings:     Marital Status:    Intimate Partner Violence:     Fear of Current or Ex-Partner:     Emotionally Abused:     Physically Abused:     Sexually Abused:        REVIEW OF SYSTEMS     Review of Systems   Constitutional: Negative for chills, diaphoresis and fever. HENT: Negative for trouble swallowing and voice change. Eyes: Negative for visual disturbance. Respiratory: Negative for cough, chest tightness and shortness of breath. Cardiovascular: Negative for chest pain and leg swelling. Gastrointestinal: Negative for abdominal pain, blood in stool, diarrhea, nausea and vomiting. Genitourinary: Negative for dysuria, frequency and hematuria. Musculoskeletal: Positive for back pain and neck pain. Skin: Negative for rash and wound. Neurological: Negative for speech difficulty, weakness, numbness and headaches. Psychiatric/Behavioral: Negative for confusion. Except as noted above the remainder of the review of systems was reviewed and is.    PHYSICAL EXAM    (up to 7 for level 4, 8 or more for level 5)     ED Triage Vitals [09/05/21 1319]   BP Temp Temp Source Pulse Resp SpO2 Height Weight   130/89 98.8 °F (37.1 °C) Oral 73 18 100 % 5' 4\" (1.626 m) 222 lb (100.7 kg)       Physical Exam  Vitals and nursing note reviewed. Constitutional:       General: She is not in acute distress. Appearance: She is well-developed. She is not ill-appearing, toxic-appearing or diaphoretic. HENT:      Head: Normocephalic and atraumatic. Eyes:      General: No scleral icterus. Conjunctiva/sclera: Conjunctivae normal.      Right eye: Right conjunctiva is not injected. Left eye: Left conjunctiva is not injected. Pupils: Pupils are equal, round, and reactive to light. Neck:      Thyroid: No thyromegaly. Trachea: No tracheal deviation. Cardiovascular:      Rate and Rhythm: Normal rate and regular rhythm. Heart sounds: Normal heart sounds. No murmur heard. No friction rub. No gallop. Pulmonary:      Effort: Pulmonary effort is normal. No respiratory distress. Breath sounds: Normal breath sounds. No stridor. No wheezing or rales. Abdominal:      General: Bowel sounds are normal. There is no distension. Palpations: Abdomen is soft. There is no mass. Tenderness: There is no abdominal tenderness. There is no guarding or rebound. Comments: Negative Juan's sign  Nontender McBurney's Point  Negative Rovsig's sign  No bruising or echymosis of abdomen   Musculoskeletal:         General: Tenderness present. Cervical back: Normal range of motion and neck supple. Comments: Negative Maribel's Sign bilaterally  Cervical spine nontender to palpation. She is tender to palpation of the left paraspinal musculature. There is no step-offs or deformities. TLS spine palpation shows no pain or a step-offs or deformities in the thoracic spine. She is somewhat tender at the midline in the upper lumbar segments. Lymphadenopathy:      Cervical: No cervical adenopathy.    Skin:     General: Skin is warm and dry. Coloration: Skin is not pale. Findings: No erythema or rash. Neurological:      Mental Status: She is alert and oriented to person, place, and time. Cranial Nerves: No cranial nerve deficit. Motor: No abnormal muscle tone. Coordination: Coordination normal.      Comments: No nystagmus   Psychiatric:         Behavior: Behavior normal.         Thought Content: Thought content normal.         DIAGNOSTIC RESULTS     EKG:(none if blank)  All EKG's are [facility] by [facility] who either signs or Co-signs this chart in the absence of a cardiologist.        RADIOLOGY: (none if blank)   Interpretation per the Radiologistbelow, if available at the time of this note:    XR LUMBAR SPINE (2-3 VIEWS)   Final Result    Stable degenerative changes without evidence of acute osseous abnormality. **This report has been created using voice recognition software. It may contain minor errors which are inherent in voice recognition technology. **      Final report electronically signed by Dr. Darryl Fabian MD on 9/5/2021 3:03 PM          LABS:  Labs Reviewed - No data to display    All other labs were within normal range or not returned as of this dictation. Please note, any cultures that may have been sent were not resulted at the time of this patient visit. EMERGENCY DEPARTMENT COURSE andMedical Decision Making:     MDM/   The patient presents with back pain. Based on clinical examination, as well as history of present illness, there is no evidence of cauda equina, diskitis, spinal epidural abscess, spinal hematoma, retroperitoneal hematoma or spinal malignancy. There is also no evidence of aortic dissection or AAA. These diagnoses, amongst a broad differential, were carefully considered in the evaluation of this patient.   The patient is able to heel walk and tippy-toe walk without foot drop and has normal neurosensory exam.      Strict
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
## 2021-09-09 ENCOUNTER — HOSPITAL ENCOUNTER (EMERGENCY)
Age: 56
Discharge: HOME | End: 2021-09-09
Payer: COMMERCIAL

## 2021-09-09 VITALS
WEIGHT: 224 LBS | HEART RATE: 73 BPM | BODY MASS INDEX: 38.45 KG/M2 | DIASTOLIC BLOOD PRESSURE: 90 MMHG | TEMPERATURE: 97.6 F | SYSTOLIC BLOOD PRESSURE: 171 MMHG | RESPIRATION RATE: 16 BRPM | OXYGEN SATURATION: 96 %

## 2021-09-09 DIAGNOSIS — Z20.822 ENCOUNTER FOR LABORATORY TESTING FOR COVID-19 VIRUS: Primary | ICD-10-CM

## 2021-09-09 DIAGNOSIS — Z20.822 CLOSE EXPOSURE TO COVID-19 VIRUS: ICD-10-CM

## 2021-09-09 LAB
INFLUENZA A: NOT DETECTED
INFLUENZA B: NOT DETECTED
SARS-COV-2 RNA, RT PCR: NOT DETECTED

## 2021-09-09 PROCEDURE — 87636 SARSCOV2 & INF A&B AMP PRB: CPT

## 2021-09-09 PROCEDURE — 99213 OFFICE O/P EST LOW 20 MIN: CPT

## 2021-09-09 PROCEDURE — 99213 OFFICE O/P EST LOW 20 MIN: CPT | Performed by: NURSE PRACTITIONER

## 2021-09-09 ASSESSMENT — PAIN DESCRIPTION - DESCRIPTORS: DESCRIPTORS: ACHING

## 2021-09-09 ASSESSMENT — PAIN DESCRIPTION - PAIN TYPE: TYPE: ACUTE PAIN

## 2021-09-09 ASSESSMENT — ENCOUNTER SYMPTOMS
CHEST TIGHTNESS: 0
COUGH: 1
EYE REDNESS: 0
EYE ITCHING: 0
SORE THROAT: 0
NAUSEA: 0
DIARRHEA: 0
SINUS PRESSURE: 0
SHORTNESS OF BREATH: 0
VOMITING: 0
ABDOMINAL PAIN: 0

## 2021-09-09 ASSESSMENT — PAIN SCALES - GENERAL: PAINLEVEL_OUTOF10: 6

## 2021-09-09 ASSESSMENT — PAIN DESCRIPTION - LOCATION: LOCATION: BACK;CHEST

## 2021-09-09 ASSESSMENT — PAIN - FUNCTIONAL ASSESSMENT: PAIN_FUNCTIONAL_ASSESSMENT: ACTIVITIES ARE NOT PREVENTED

## 2021-09-09 ASSESSMENT — PAIN DESCRIPTION - PROGRESSION: CLINICAL_PROGRESSION: NOT CHANGED
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
## 2021-09-09 NOTE — ED PROVIDER NOTES
[id number]       Chief Complaint   Patient presents with    Concern For COVID-19     exposed to a positive COVID \"best friends\"    Cough     \"chest is sore with a cough\"        Nurses Notes reviewed and I agree except as noted in the HPI. HISTORY OF PRESENT ILLNESS   Tiffanie Gray is a 64 y.o. female who presents for COVID-19 testing after being exposed. Has cough and soreness with cough. Vaccinated. Works at [employer]. REVIEW OF SYSTEMS     Review of Systems   Constitutional: Negative for chills, fatigue and fever. HENT: Negative for congestion, ear pain, sinus pressure and sore throat. Eyes: Negative for redness and itching. Respiratory: Positive for cough. Negative for chest tightness and shortness of breath. Cardiovascular: Negative for chest pain. Gastrointestinal: Negative for abdominal pain, diarrhea, nausea and vomiting. Musculoskeletal: Negative for joint swelling and myalgias. Skin: Negative for rash. Allergic/Immunologic: Negative for environmental allergies and food allergies. Neurological: Negative for dizziness and headaches. Hematological: Negative for adenopathy. PAST MEDICAL HISTORY         Diagnosis Date    Asthma     Depression     Diverticulitis     History of deep vein thrombophlebitis of lower extremity     Osteoarthritis 04/2020    Osteopenia     PE (pulmonary embolism)     x2    Plantar fasciitis, right 3/2016    Sleep apnea     tested Dec 2013 needs CPAP yet    Type 2 diabetes mellitus without complication ([facility] [address] 80/72/9590       SURGICAL HISTORY     Patient  has a past surgical history that includes Vena Cava Filter Placement; Ankle surgery; other surgical history; Toe Surgery (06/25/2019); and Dilation and curettage of uterus (N/A, 8/15/2019).     CURRENT MEDICATIONS       Previous Medications    ACETAMINOPHEN (TYLENOL PO)    Take 1,000 mg by mouth as needed Indications: Pain Don't take more than 3,000 mg per day, including what's in Percocet. ALBUTEROL SULFATE (VENTOLIN HFA IN)    Inhale 2 puffs into the lungs every 4 hours as needed Indications: Asthma     ALENDRONATE (FOSAMAX) 70 MG TABLET    Take 70 mg by mouth every 28 days     ATORVASTATIN (LIPITOR) 20 MG TABLET    Take 20 mg by mouth daily Indications: Blood Cholesterol Abnormal    CALTRATE 600+D PLUS MINERALS (CALTRATE) 600-800 MG-UNIT TABS TABLET    Take 2 tablets by mouth daily     LIDOCAINE (LIDODERM) 5 %    Place 1 patch onto the skin daily 12 hours on, 12 hours off. LISINOPRIL (PRINIVIL;ZESTRIL) 5 MG TABLET    Take 5 mg by mouth daily Indications: Diabetes, Raised Blood Pressure    METFORMIN (GLUCOPHAGE) 500 MG TABLET    Take 500 mg by mouth 2 times daily (with meals)     MONTELUKAST (SINGULAIR) 10 MG TABLET    Take 10 mg by mouth daily    OXYCODONE-ACETAMINOPHEN (PERCOCET) 5-325 MG PER TABLET    Take by mouth See Admin Instructions. Indications: Pain     TRIAMCINOLONE (NASACORT) 55 MCG/ACT NASAL INHALER    1 spray by Nasal route daily In each nostril    WARFARIN (COUMADIN) 5 MG TABLET    Take as directed by [facility]. #270 Tablets = 90 day supply       ALLERGIES     Patient is has No Known Allergies. FAMILY HISTORY     Patient'sfamily history includes Cancer in her mother; Diabetes in her brother; High Blood Pressure in her sister; High Cholesterol in her sister; Stroke in her maternal grandmother. SOCIAL HISTORY     Patient  reports that she has never smoked. She has never used smokeless tobacco. She reports current alcohol use. She reports that she does not use drugs. PHYSICAL EXAM     ED TRIAGE VITALS  BP: (!) 171/90, Temp: 97.6 °F (36.4 °C), Pulse: 73, Resp: 16, SpO2: 96 %  Physical Exam  Vitals and nursing note reviewed. Constitutional:       General: She is not in acute distress. Appearance: Normal appearance. She is well-developed and well-groomed.    HENT:      Head: Normocephalic and atraumatic. Right Ear: External ear normal.      Left Ear: External ear normal.      Nose: Nose normal.      Mouth/Throat:      Lips: Pink. Mouth: Mucous membranes are moist.   Eyes:      Conjunctiva/sclera: Conjunctivae normal.      Right eye: Right conjunctiva is not injected. Left eye: Left conjunctiva is not injected. Pupils: Pupils are equal.   Cardiovascular:      Rate and Rhythm: Normal rate. Heart sounds: Normal heart sounds. Pulmonary:      Effort: Pulmonary effort is normal. No respiratory distress. Breath sounds: Normal breath sounds. Musculoskeletal:      Cervical back: Normal range of motion. Skin:     General: Skin is warm and dry. Findings: No rash (on exposed surfaces). Neurological:      Mental Status: She is alert and oriented to person, place, and time. Psychiatric:         Mood and Affect: Mood normal.         Speech: Speech normal.         Behavior: Behavior is cooperative. DIAGNOSTIC RESULTS   Labs:  Abnormal Labs Reviewed - No abnormal labs to display     IMAGING:  No orders to display     URGENT CARE COURSE:     Vitals:    09/09/21 1255   BP: (!) 171/90   Pulse: 73   Resp: 16   Temp: 97.6 °F (36.4 °C)   TempSrc: Temporal   SpO2: 96%   Weight: 224 lb (101.6 kg)       Medications - No data to display  PROCEDURES:  FINALIMPRESSION      1. Encounter for laboratory testing for COVID-19 virus    2. Close exposure to COVID-19 virus        DISPOSITION/PLAN   DISPOSITION    Discharge   Physical exam is unremarkable. COVID by PCR obtained to working at [employer]. Self isolate until test results are known. Physical assessment findings, diagnostic testing(s) if applicable, and vital signs reviewed with patient/patient representative. If applicable, patient/patient representative will be contacted upon receipt of final culture and sensitivity or other testing results when available.   Any additions or changes to medications or changes the plan of care will be made at that time. Differential diagnosis(s) discussed with patient/patient representative. Patient is to follow-up with family care provider in 2-3 days if no improvement. Patient is to go to the emergency department if symptoms change/worsen. Patient/patient representative is aware of care plan, questions answered, verbalizes understanding and is in agreement. Printed instructions attached to after visit summary. Problem List Items Addressed This Visit     None      Visit Diagnoses     Encounter for laboratory testing for COVID-19 virus    -  Primary    Close exposure to COVID-19 virus              PATIENT REFERRED TO:  Felicia Sorto, APRN - CNP  [address] [place] [address]  [phone]    Schedule an appointment as soon as possible for a visit in 3 days  For further evaluation. , If symptoms change/worsen, go to the [facility]  [address] [facility]  [phone]    as needed, If symptoms change/worsen, go to the [address] [facility], [address] Marilin Caicedo, APRN - CNP  09/09/21 6316

## 2021-09-09 NOTE — ED NOTES
Pt verbalized discharge instructions. Pt informed to go to ER if develop chest pain, shortness of breath or abdominal pain. Pt ambulatory out in stable condition. Assessment unchanged.        Leila Ramos RN  09/09/21 2120

## 2021-09-13 ENCOUNTER — TELEPHONE (OUTPATIENT)
Dept: PHARMACY | Age: 56
End: 2021-09-13

## 2021-09-13 NOTE — TELEPHONE ENCOUNTER
Patient left VM - to receive epidural on 9/20/21 and needs to hold coumadin x5 days. She tried calling last week but forgot. Patient has history of recurrent PE.     Called patient (no answer) - left VM to call back to give following instructions (no instructions given yet)    Patient ended up showing up at clinic - gave instructions to her physically, sent lovenox Rx to [facility] ([facility]), and rescheduled 10/5 visit for 9/27    ABW:  101.6kg  Calculated CrCl:  78mL/min  Plt:  285  Lovenox dose:  100mg BID    Medication Management [facility] Instruction Sheet  Patient:   Zora Castillo      YOB: 1965    Procedure:  Lumbar epidural        Date of Procedure:  9/20/21    Day Lovenox AM Lovenox PM Coumadin PM     9/15/21   none   none   none       9/16/21   150mg   none   none       9/17/21     150mg   none   none     9/18/21     150mg   none   none     9/19/21     75mg   none   none     9/20/21     none   none   none     9/21/21     none   150mg   17.5mg     9/22/21     none   150mg   17.5mg     9/23/21     none   150mg   15mg     9/24/21     none   150mg   15mg     9/25/21     none   150mg   10mg     9/26/21     none   150mg   10mg             INR to be checked:  9/27/21  Chetna Li RPH/9/13/21    Clinical Pharmacist/Date    Any questions please call [facility] at [phone]

## 2021-09-16 ENCOUNTER — TELEPHONE (OUTPATIENT)
Dept: CARDIOLOGY CLINIC | Age: 56
End: 2021-09-16

## 2021-09-16 NOTE — TELEPHONE ENCOUNTER
----- Message from Maximiliano Marie RN sent at 9/16/2021  9:37 AM EDT -----  Please renew referral for Anticoagulation Services. Thanks, L.  Rossy Law RN, BSN

## 2021-09-23 ENCOUNTER — HOSPITAL ENCOUNTER (EMERGENCY)
Age: 56
Discharge: HOME | End: 2021-09-24
Attending: EMERGENCY MEDICINE
Payer: COMMERCIAL

## 2021-09-23 VITALS
RESPIRATION RATE: 18 BRPM | HEIGHT: 64 IN | SYSTOLIC BLOOD PRESSURE: 151 MMHG | TEMPERATURE: 98.5 F | HEART RATE: 84 BPM | WEIGHT: 220 LBS | DIASTOLIC BLOOD PRESSURE: 103 MMHG | BODY MASS INDEX: 37.56 KG/M2 | OXYGEN SATURATION: 98 %

## 2021-09-23 DIAGNOSIS — M54.30 SCIATIC LEG PAIN: ICD-10-CM

## 2021-09-23 DIAGNOSIS — M79.604 RIGHT LEG PAIN: Primary | ICD-10-CM

## 2021-09-23 PROCEDURE — 96372 THER/PROPH/DIAG INJ SC/IM: CPT

## 2021-09-23 PROCEDURE — 99283 EMERGENCY DEPT VISIT LOW MDM: CPT

## 2021-09-23 RX ORDER — KETOROLAC TROMETHAMINE 30 MG/ML
30 INJECTION, SOLUTION INTRAMUSCULAR; INTRAVENOUS ONCE
Status: DISCONTINUED | OUTPATIENT
Start: 2021-09-24 | End: 2021-09-24

## 2021-09-23 RX ORDER — CYCLOBENZAPRINE HCL 10 MG
10 TABLET ORAL ONCE
Status: COMPLETED | OUTPATIENT
Start: 2021-09-24 | End: 2021-09-24

## 2021-09-23 ASSESSMENT — PAIN DESCRIPTION - LOCATION: LOCATION: LEG

## 2021-09-23 ASSESSMENT — PAIN DESCRIPTION - ORIENTATION: ORIENTATION: RIGHT

## 2021-09-23 ASSESSMENT — PAIN SCALES - GENERAL: PAINLEVEL_OUTOF10: 9

## 2021-09-23 ASSESSMENT — PAIN DESCRIPTION - PAIN TYPE: TYPE: CHRONIC PAIN

## 2021-09-24 PROCEDURE — 6360000002 HC RX W HCPCS: Performed by: STUDENT IN AN ORGANIZED HEALTH CARE EDUCATION/TRAINING PROGRAM

## 2021-09-24 PROCEDURE — 6370000000 HC RX 637 (ALT 250 FOR IP): Performed by: STUDENT IN AN ORGANIZED HEALTH CARE EDUCATION/TRAINING PROGRAM

## 2021-09-24 RX ORDER — CYCLOBENZAPRINE HCL 10 MG
10 TABLET ORAL 2 TIMES DAILY
Qty: 10 TABLET | Refills: 0 | Status: SHIPPED | OUTPATIENT
Start: 2021-09-24 | End: 2021-09-29

## 2021-09-24 RX ORDER — KETOROLAC TROMETHAMINE 30 MG/ML
30 INJECTION, SOLUTION INTRAMUSCULAR; INTRAVENOUS ONCE
Status: COMPLETED | OUTPATIENT
Start: 2021-09-24 | End: 2021-09-24

## 2021-09-24 RX ADMIN — KETOROLAC TROMETHAMINE 30 MG: 30 INJECTION, SOLUTION INTRAMUSCULAR; INTRAVENOUS at 00:04

## 2021-09-24 RX ADMIN — CYCLOBENZAPRINE 10 MG: 10 TABLET, FILM COATED ORAL at 00:04

## 2021-09-24 ASSESSMENT — ENCOUNTER SYMPTOMS
GASTROINTESTINAL NEGATIVE: 1
EYES NEGATIVE: 1

## 2021-09-24 ASSESSMENT — PAIN SCALES - GENERAL: PAINLEVEL_OUTOF10: 8

## 2021-09-24 NOTE — ED TRIAGE NOTES
Patient arrives ambulatory through triage with complaints of right leg pain. Patient admits to having been seen for it frequently by provider. States she has arthritis in her hip and gets shots, last on 9/4. Patient received epidural on Monday without relief. Patient admits to stress fracture and needs to have surgery but has not had it yet.

## 2021-09-24 NOTE — ED PROVIDER NOTES
[place] ENCOUNTER          Pt Name: Veronica Blanchard  MRN: [id number]  [place] 1965  Date of evaluation: 9/23/2021  Treating Resident Physician: Lev Teran MD  Supervising Physician: Lashanda Stallings MD    CHIEF COMPLAINT       Chief Complaint   Patient presents with    Leg Pain     History obtained from the patient. HISTORY OF PRESENT ILLNESS    HPI  Veronica Blanchard is a 64 y.o. female who presents to the emergency department for evaluation of leg pain. Previous medical history of right leg radiculopathy, currently complaining of increased in right leg pain which started 12 hours ago starting from right gluteal area radiating distally, 10/10 in intensity associated with numbness of the foot. Patient denies weakness, denies leg swelling, denies chest pain or shortness of breath. Refers has been treated with multiple medications like Tylenol, Narcan, Percocet, epidural injections. The patient has no other acute complaints at this time. REVIEW OF SYSTEMS   Review of Systems   Constitutional: Negative. HENT: Negative. Eyes: Negative. Cardiovascular: Negative. Gastrointestinal: Negative. Genitourinary: Negative. Musculoskeletal: Negative. Neurological: Negative. Psychiatric/Behavioral: Negative. PAST MEDICAL AND SURGICAL HISTORY     Past Medical History:   Diagnosis Date    Asthma     Depression     Diverticulitis     History of deep vein thrombophlebitis of lower extremity     Osteoarthritis 04/2020    Osteopenia     PE (pulmonary embolism)     x2    Plantar fasciitis, right 3/2016    Sleep apnea     tested Dec 2013 needs CPAP yet    Type 2 [facility] mellitus without complication [facility] 88/25/6758     Past Surgical History:   Procedure Laterality Date    ANKLE SURGERY      Left Ankle.  plate    DILATION AND CURETTAGE OF UTERUS N/A 8/15/2019    HYSTEROSCOPY DILATATION AND CURETTAGE on file     Social History     Tobacco Use    Smoking status: Never Smoker    Smokeless tobacco: Never Used   Vaping Use    Vaping Use: Never used   Substance Use Topics    Alcohol use: Yes     Comment: Ocassionally    Drug use: No         ALLERGIES   No Known Allergies      FAMILY HISTORY     Family History   Problem Relation Age of Onset    Cancer Mother         breast    High Blood Pressure Sister     High Cholesterol Sister     Diabetes Brother     Stroke Maternal Grandmother          PREVIOUS RECORDS   Previous records reviewed: Diabetes, osteoarthritis, pulmonary embolism/DVT 2 times. Currently on Coumadin, Metformin, lisinopril. Lumbar MRI reports. L4/L5 severe by foraminal stenosis, central canal stenosis L5/S1 foraminal stenosis, stress fracture. No new allergies, no recent trauma, no recent surgeries. PHYSICAL EXAM     ED Triage Vitals [09/23/21 2243]   BP Temp Temp Source Pulse Resp SpO2 Height Weight   (!) 151/103 98.5 °F (36.9 °C) Oral 84 18 98 % 5' 4\" (1.626 m) 220 lb (99.8 kg)     Initial vital signs and nursing assessment reviewed and abnormal from High systolic and diastolic blood pressure. Body mass index is 37.76 kg/m². Pulsoximetry is normal per my interpretation. Additional Vital Signs:  Vitals:    09/23/21 2243   BP: (!) 151/103   Pulse: 84   Resp: 18   Temp: 98.5 °F (36.9 °C)   SpO2: 98%       Physical Exam  Constitutional:       Appearance: Normal appearance. HENT:      Head: Normocephalic and atraumatic. Nose: Nose normal.      Mouth/Throat:      Mouth: Mucous membranes are moist.   Eyes:      Extraocular Movements: Extraocular movements intact. Pupils: Pupils are equal, round, and reactive to light. Cardiovascular:      Rate and Rhythm: Normal rate and regular rhythm. Pulses: Normal pulses. Heart sounds: Normal heart sounds. Pulmonary:      Effort: Pulmonary effort is normal.      Breath sounds: Normal breath sounds.    Abdominal: General: Abdomen is flat. There is no distension. Palpations: Abdomen is soft. Tenderness: There is no abdominal tenderness. There is no guarding. Musculoskeletal:         General: No swelling, tenderness, deformity or signs of injury. Cervical back: Normal range of motion and neck supple. Skin:     General: Skin is warm. Capillary Refill: Capillary refill takes less than 2 seconds. Neurological:      Mental Status: She is alert and oriented to person, place, and time. Sensory: No sensory deficit. Motor: No weakness. Coordination: Coordination normal.      Gait: Gait normal.   Psychiatric:         Mood and Affect: Mood normal.             MEDICAL DECISION MAKING   Initial Assessment:   3 39year-old patient complaining of right leg pain associated with numbness, radiological findings of central cord stenosis at lumbar level. During physical examination there is no sensory or motor deficits. We ordered muscle relaxants oral and IM with adequate control of symptoms. Plan:    Decision to discharge with follow-up with primary care physician in 2 days, alarm signs, recommendations, patient understands and agrees. ED RESULTS   Laboratory results:  Labs Reviewed - No data to display    Radiologic studies results:  No orders to display       ED Medications administered this visit:   Medications   cyclobenzaprine (FLEXERIL) tablet 10 mg (10 mg Oral Given 9/24/21 0004)   ketorolac (TORADOL) injection 30 mg (30 mg IntraMUSCular Given 9/24/21 0004)         ED COURSE        Strict return precautions and follow up instructions were discussed with the patient prior to discharge, with which the patient agrees.       MEDICATION CHANGES     Discharge Medication List as of 9/24/2021 12:46 AM      START taking these medications    Details   cyclobenzaprine (FLEXERIL) 10 MG tablet Take 1 tablet by mouth 2 times daily for 5 days, Disp-10 tablet, R-0Normal               FINAL DISPOSITION Final diagnoses:   Right leg pain   Sciatic leg pain     Condition: condition: good  Dispo: Discharge to home      This transcription was electronically signed. Parts of this transcriptions may have been dictated by use of voice recognition software and electronically transcribed, and parts may have been transcribed with the assistance of an ED scribe. The transcription may contain errors not detected in proofreading. Please refer to my supervising physician's documentation if my documentation differs. Electronically Signed: Ely Rodriguez MD, 09/24/21, 1:31 AM         Ely Rodriguez MD  Resident  09/24/21 0131      Attending Supervising [de-identified] Attestation Statement  I performed a history and physical examination on the patient and discussed the management with the resident physician. I reviewed and agree with the findings and plan as documented in his note unless described otherwise below. Pt presents to the ER with radicular type R posterior leg pain. Medicated here and pain controlled. Pt ambulatory with steady gait, 5/5 strength in all ext, normal sensation, normal reflexes, no fever, no red flags for cauda equina. Pt stable for dc home with further testing and treatment as outpatient. Counseled regarding ER return precautions.     Electronically signed by Kirk Levy MD on 9/24/21 at 2:23 AM ZORAIDA Cagle MD  09/24/21 7502

## 2021-09-27 ENCOUNTER — HOSPITAL ENCOUNTER (OUTPATIENT)
Dept: PHARMACY | Age: 56
Setting detail: THERAPIES SERIES
Discharge: HOME | End: 2021-09-27
Payer: COMMERCIAL

## 2021-09-27 DIAGNOSIS — Z51.81 ENCOUNTER FOR THERAPEUTIC DRUG MONITORING: ICD-10-CM

## 2021-09-27 DIAGNOSIS — Z79.01 ANTICOAGULATED ON COUMADIN: Primary | ICD-10-CM

## 2021-09-27 DIAGNOSIS — I26.99 PULMONARY EMBOLISM, OTHER, UNSPECIFIED CHRONICITY, UNSPECIFIED WHETHER ACUTE COR PULMONALE PRESENT (HCC): ICD-10-CM

## 2021-09-27 LAB — POC INR: 2.1 (ref 0.8–1.2)

## 2021-09-27 PROCEDURE — 85610 PROTHROMBIN TIME: CPT

## 2021-09-27 PROCEDURE — 36416 COLLJ CAPILLARY BLOOD SPEC: CPT

## 2021-09-27 PROCEDURE — 99211 OFF/OP EST MAY X REQ PHY/QHP: CPT

## 2021-09-27 NOTE — PROGRESS NOTES
Medication Management [address]  [phone] (phone)  [phone] (fax)    Ms. Reese Chi is a 64 y.o.  female with history of PE who presents today for anticoagulation monitoring and adjustment. Patient used last Lovenox injection last night 09/26. Patient verifies current dosing regimen and tablet strength. No missed or extra doses. Patient denies s/s bleeding/bruising/swelling/SOB/chest pain  No blood in urine or stool. Patient has had just a few less greens this week. No changes in OTC agents/Herbals. Patient was started on Flexeril 10 mg BID. Already on home medication list.   No change in alcohol use or tobacco use. Little less active due to back pain. Patient denies headaches/dizziness/lightheadedness/falls. No vomiting/diarrhea or acute illness. She may be having surgery on her back in the future. She will let us know if this is scheduled. Patients PCP is Noni Moritz APRN-CNP at [facility] [employer] in [facility].[place]    Assessment:   Lab Results   Component Value Date    INR 2.10 (H) 09/27/2021    INR 2.70 (H) 08/31/2021    INR 1.94 (H) 07/29/2021     INR therapeutic   Recent Labs     09/27/21  1555   INR 2.10*       Plan:  Continue Coumadin 15 mg MWF and 10 mg TThSaSun. Stop Lovenox 150mg subQ daily. Recheck INR in 2.5 week(s). (Prior to procedure was every 4-5 weeks) Plan discussed with Faith Smyth RPh. Patient reminded to call the Anticoagulation Clinic with any signs or symptoms of bleeding or with any medication changes. Patient given instructions utilizing the teach back method. After visit summary printed and reviewed with patient. Discharged ambulatory in no apparent distress.     Diana Santamaria, Paz  9/27/2021  4:11 PM    For Pharmacy Admin Tracking Only     Time Spent (min): 20

## 2021-10-04 ENCOUNTER — TELEPHONE (OUTPATIENT)
Dept: PHARMACY | Age: 56
End: 2021-10-04

## 2021-10-04 NOTE — TELEPHONE ENCOUNTER
Spoke with patient. Informed her tizanidine and Coumadin should not interact. Advised patient to continue Coumadin 15 mg MWF and 10 mg TuThSaSu with INR 10/13/21 @ 8 am as originally planned. Patient voiced understanding.     Alin Jacobs, AundreaD, BCPS  10/4/2021  12:51 PM

## 2021-10-04 NOTE — TELEPHONE ENCOUNTER
----- Message from "[employer] sent at 10/4/2021 12:37 PM EDT -----  Contact: [phone]  Aimee called to see if tizanidine hcl 4 mg is ok to take with coumadin.

## 2021-10-13 ENCOUNTER — HOSPITAL ENCOUNTER (OUTPATIENT)
Dept: PHARMACY | Age: 56
Setting detail: THERAPIES SERIES
Discharge: HOME | End: 2021-10-13
Payer: COMMERCIAL

## 2021-10-13 DIAGNOSIS — Z51.81 ENCOUNTER FOR THERAPEUTIC DRUG MONITORING: ICD-10-CM

## 2021-10-13 DIAGNOSIS — Z79.01 ANTICOAGULATED ON COUMADIN: Primary | ICD-10-CM

## 2021-10-13 LAB — POC INR: 3.4 (ref 0.8–1.2)

## 2021-10-13 PROCEDURE — G0008 ADMIN INFLUENZA VIRUS VAC: HCPCS | Performed by: INTERNAL MEDICINE

## 2021-10-13 PROCEDURE — 99211 OFF/OP EST MAY X REQ PHY/QHP: CPT

## 2021-10-13 PROCEDURE — 85610 PROTHROMBIN TIME: CPT

## 2021-10-13 PROCEDURE — 36416 COLLJ CAPILLARY BLOOD SPEC: CPT

## 2021-10-13 PROCEDURE — 6360000002 HC RX W HCPCS: Performed by: INTERNAL MEDICINE

## 2021-10-13 PROCEDURE — 90686 IIV4 VACC NO PRSV 0.5 ML IM: CPT | Performed by: INTERNAL MEDICINE

## 2021-10-13 RX ORDER — TIZANIDINE 4 MG/1
4 TABLET ORAL EVERY 8 HOURS PRN
COMMUNITY
End: 2022-04-07 | Stop reason: ALTCHOICE

## 2021-10-13 RX ADMIN — INFLUENZA A VIRUS A/VICTORIA/2570/2019 IVR-215 (H1N1) ANTIGEN (PROPIOLACTONE INACTIVATED), INFLUENZA A VIRUS A/CAMBODIA/E0826360/2020 IVR-224 (H3N2) ANTIGEN (PROPIOLACTONE INACTIVATED), INFLUENZA B VIRUS B/VICTORIA/705/2018 BVR-11 ANTIGEN (PROPIOLACTONE INACTIVATED), INFLUENZA B VIRUS B/PHUKET/3073/2013 BVR-1B ANTIGEN (PROPIOLACTONE INACTIVATED) 0.5 ML: 15; 15; 15; 15 INJECTION, SUSPENSION INTRAMUSCULAR at 08:39

## 2021-10-13 NOTE — PROGRESS NOTES
Medication Management [address]   [phone] (phone)  [phone] (fax)    Ms. Maggy Ron is a 64 y.o.  female with history of DVT, PE who presents today for anticoagulation monitoring and adjustment. Patient verifies current dosing regimen and tablet strength. No missed or extra doses. Patient denies s/s bleeding/bruising/swelling/SOB/chest pain  No blood in urine or stool. No dietary changes. No changes in OTC agents/Herbals. Patient started Zanaflex 4 mg TID PRN. No change in tobacco use. Patient had an alcoholic drink yesterday. No change in activity level. Patient denies headaches/dizziness/lightheadedness/falls. No vomiting/diarrhea or acute illness. No Procedures scheduled in the future at this time. Assessment:   Lab Results   Component Value Date    INR 3.40 (H) 10/13/2021    INR 2.10 (H) 09/27/2021    INR 2.70 (H) 08/31/2021     INR supratherapeutic   Recent Labs     10/13/21  0822   INR 3.40*     Patient is supratherapeutic today and the alcoholic drink from yesterday may be contributing. Previously, patient has been well-controlled on current regimen since the end of March 2021. Plan:  HOLD Coumadin x 1 dose today 10/13/21 then continue Coumadin 15 mg MWF and 10 mg TuThSaSu. Recheck INR in 2 week(s). Patient reminded to call the Anticoagulation Clinic with any signs or symptoms of bleeding or with any medication changes. Patient given instructions utilizing the teach back method. After obtaining consent, Afluria given by Sharron Santana PharmD. Patient instructed to remain in clinic for 20 minutes afterwards, and to report any adverse reaction to staff immediately. CDC Vaccine Information Sheet given to patient for immunization(s) administered. Patient tolerated well, please see immunization note. After visit summary printed and reviewed with patient. Discharged ambulatory in no apparent distress.     For Pharmacy Admin Tracking Only     Intervention Detail: Dose Adjustment: 1, reason: Therapy Optimization and Vaccine Recommended/Administered   Total # of Interventions Recommended: 1   Total # of Interventions Accepted: 1   Time Spent (min): [address], PharmD, BCPS  10/13/2021  8:38 AM

## 2021-10-27 ENCOUNTER — HOSPITAL ENCOUNTER (OUTPATIENT)
Dept: PHARMACY | Age: 56
Setting detail: THERAPIES SERIES
Discharge: HOME | End: 2021-10-27
Payer: COMMERCIAL

## 2021-10-27 DIAGNOSIS — Z79.01 ANTICOAGULATED ON COUMADIN: Primary | ICD-10-CM

## 2021-10-27 DIAGNOSIS — Z51.81 ENCOUNTER FOR THERAPEUTIC DRUG MONITORING: ICD-10-CM

## 2021-10-27 LAB — POC INR: 2 (ref 0.8–1.2)

## 2021-10-27 PROCEDURE — 85610 PROTHROMBIN TIME: CPT

## 2021-10-27 PROCEDURE — 36416 COLLJ CAPILLARY BLOOD SPEC: CPT

## 2021-10-27 PROCEDURE — 99211 OFF/OP EST MAY X REQ PHY/QHP: CPT

## 2021-10-27 NOTE — PROGRESS NOTES
Medication Management [address]  [phone] (phone)  [phone] (fax)    Ms. Sofia Hernández is a 64 y.o.  female with history of DVT, PE who presents today for anticoagulation monitoring and adjustment. Patient verifies current dosing regimen and tablet strength. No missed or extra doses. Patient denies s/s bleeding/bruising/swelling/SOB/chest pain  No blood in urine or stool. No dietary changes. No changes in medication/OTC agents/Herbals. No change in alcohol use or tobacco use. No change in activity level. Patient denies dizziness/lightheadedness/falls. Patient gets occasional headaches. She attributes these to stress and states they are relieved by Tylenol. No vomiting/diarrhea or acute illness. No Procedures scheduled in the future at this time. Patient may have back surgery. She will notify clinic if/when this is scheduled. Assessment:   Lab Results   Component Value Date    INR 2.00 (H) 10/27/2021    INR 3.40 (H) 10/13/2021    INR 2.10 (H) 09/27/2021     INR therapeutic   Recent Labs     10/27/21  1558   INR 2.00*     Patient interview completed and discussed with pharmacist by Lyric Baca PharmD Candidate. Patient has been stable on current regimen since April 2021 with only one supratherapeutic INR since that time. Plan:  Continue Coumadin 15 mg MWF and 10 mg TuThSaSu. Recheck INR in 4 week(s). Patient reminded to call the Anticoagulation Clinic with any signs or symptoms of bleeding or with any medication changes. Patient given instructions utilizing the teach back method. After visit summary printed and reviewed with patient. Discharged ambulatory in no apparent distress.     For Pharmacy Admin Tracking Only     Total # of Interventions Recommended: 0   Total # of Interventions Accepted: 0   Time Spent (min): 7172  [facility], [place], BCPS  10/27/2021  4:10 PM

## 2021-11-22 ENCOUNTER — HOSPITAL ENCOUNTER (OUTPATIENT)
Dept: PHARMACY | Age: 56
Setting detail: THERAPIES SERIES
Discharge: HOME | End: 2021-11-22
Payer: COMMERCIAL

## 2021-11-22 DIAGNOSIS — I26.99 PULMONARY EMBOLISM, OTHER, UNSPECIFIED CHRONICITY, UNSPECIFIED WHETHER ACUTE COR PULMONALE PRESENT (HCC): ICD-10-CM

## 2021-11-22 DIAGNOSIS — Z79.01 ANTICOAGULATED ON COUMADIN: Primary | ICD-10-CM

## 2021-11-22 DIAGNOSIS — Z51.81 ENCOUNTER FOR THERAPEUTIC DRUG MONITORING: ICD-10-CM

## 2021-11-22 LAB — POC INR: 3.3 (ref 0.8–1.2)

## 2021-11-22 PROCEDURE — 36416 COLLJ CAPILLARY BLOOD SPEC: CPT

## 2021-11-22 PROCEDURE — 99211 OFF/OP EST MAY X REQ PHY/QHP: CPT

## 2021-11-22 PROCEDURE — 85610 PROTHROMBIN TIME: CPT
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
## 2021-11-30 LAB
HCT VFR BLD CALC: 37.6 % (ref 35–44)
HEMOGLOBIN: 12.4 GM/DL (ref 12–15)

## 2021-12-14 ENCOUNTER — HOSPITAL ENCOUNTER (OUTPATIENT)
Dept: PHARMACY | Age: 56
Setting detail: THERAPIES SERIES
Discharge: HOME | End: 2021-12-14
Payer: COMMERCIAL

## 2021-12-14 ENCOUNTER — APPOINTMENT (OUTPATIENT)
Dept: PHARMACY | Age: 56
End: 2021-12-14
Payer: COMMERCIAL

## 2021-12-14 DIAGNOSIS — Z51.81 ENCOUNTER FOR THERAPEUTIC DRUG MONITORING: ICD-10-CM

## 2021-12-14 DIAGNOSIS — I26.99 PULMONARY EMBOLISM, UNSPECIFIED CHRONICITY, UNSPECIFIED PULMONARY EMBOLISM TYPE, UNSPECIFIED WHETHER ACUTE COR PULMONALE PRESENT (HCC): ICD-10-CM

## 2021-12-14 DIAGNOSIS — Z79.01 ANTICOAGULATED ON COUMADIN: Primary | ICD-10-CM

## 2021-12-14 LAB — POC INR: 2.7 (ref 0.8–1.2)

## 2021-12-14 PROCEDURE — 99211 OFF/OP EST MAY X REQ PHY/QHP: CPT

## 2021-12-14 PROCEDURE — 36416 COLLJ CAPILLARY BLOOD SPEC: CPT

## 2021-12-14 PROCEDURE — 85610 PROTHROMBIN TIME: CPT

## 2021-12-14 NOTE — PROGRESS NOTES
Medication Management [address]  [phone] (phone)  [phone] (fax)    Ms. Vi Benjamin is a 64 y.o.  female with history of DVT, PE who presents today for anticoagulation monitoring and adjustment. Patient verifies current dosing regimen and tablet strength. No missed or extra doses. Patient denies s/s bleeding/bruising/swelling/SOB/chest pain  No blood in urine or stool. No dietary changes. No changes in medication/Herbals. Has been taking Tylenol arthritis ~ 2-3 times per week. No change in alcohol use or tobacco use. No change in activity level. Patient has been having headaches, thinks it could be due to blood pressure. Says if they get worse, she will go to the ER. States she is getting a blood pressure monitor for at home today and will monitor her blood pressure. Denies dizziness/lightheadedness/falls. No vomiting/diarrhea or acute illness. No Procedures scheduled in the future at this time. Will have surgery on back in January and will let the clinic know when. States she has an appointment 1/7/22 and will likely find out when it is scheduled then. Assessment:     Lab Results   Component Value Date    INR 2.70 (H) 12/14/2021    INR 3.30 (H) 11/22/2021    INR 2.00 (H) 10/27/2021     INR therapeutic   Recent Labs     12/14/21  0854   INR 2.70*      Ref. Range 5/12/2020 09:50 7/8/2020 15:54 11/12/2020 23:14 5/28/2021 11:22 5/28/2021 11:22 11/30/2021 11:47   Hemoglobin Quant Latest Ref Range: 12.0 - 15.0 gm/dL 12.4 12.1 13.4 13.0 13.1 12.4   Hematocrit Latest Ref Range: 35.0 - 44.0 % 39.3 37.8 40.6 39.0 39.3 37.6     Patient interview completed and discussed with pharmacist by Yolanda Garcia, PharmD Candidate 2022. Plan:  Continue Coumadin 15 mg MoWeFr and 10 mg SuTuThSa. Recheck INR in 3 week(s). Patient reminded to call the Anticoagulation Clinic with any signs or symptoms of bleeding or with any medication changes.   Patient given instructions utilizing the teach back method. After visit summary printed and reviewed with patient. Discharged ambulatory in no apparent distress.     For Pharmacy Admin Tracking Only     Time Spent (min): 15

## 2021-12-27 ENCOUNTER — TELEPHONE (OUTPATIENT)
Dept: PHARMACY | Age: 56
End: 2021-12-27

## 2021-12-27 NOTE — TELEPHONE ENCOUNTER
Patient left message stating that she was restarted on Adipex and Flexeril. Returned patient's call and informed her that we do not anticipate an interaction with her warfarin. Patient voiced understanding. Patient will continue warfarin 15 mg MWF and 10 mg TuThSaSu.  Patient's next [facility] appointment is on 1/4/22 at [place] [phone].

## 2022-01-04 ENCOUNTER — HOSPITAL ENCOUNTER (OUTPATIENT)
Dept: PHARMACY | Age: 57
Setting detail: THERAPIES SERIES
Discharge: HOME | End: 2022-01-04
Payer: COMMERCIAL

## 2022-01-04 DIAGNOSIS — Z51.81 ENCOUNTER FOR THERAPEUTIC DRUG MONITORING: ICD-10-CM

## 2022-01-04 DIAGNOSIS — I26.99 PULMONARY EMBOLISM, OTHER, UNSPECIFIED CHRONICITY, UNSPECIFIED WHETHER ACUTE COR PULMONALE PRESENT (HCC): ICD-10-CM

## 2022-01-04 DIAGNOSIS — Z79.01 ANTICOAGULATED ON COUMADIN: Primary | ICD-10-CM

## 2022-01-04 LAB — POC INR: 3.3 (ref 0.8–1.2)

## 2022-01-04 PROCEDURE — 85610 PROTHROMBIN TIME: CPT

## 2022-01-04 PROCEDURE — 99211 OFF/OP EST MAY X REQ PHY/QHP: CPT

## 2022-01-04 PROCEDURE — 36416 COLLJ CAPILLARY BLOOD SPEC: CPT

## 2022-01-04 RX ORDER — RABEPRAZOLE SODIUM 20 MG/1
20 TABLET, DELAYED RELEASE ORAL DAILY
COMMUNITY
End: 2022-01-31

## 2022-01-04 RX ORDER — CYCLOBENZAPRINE HCL 10 MG
10 TABLET ORAL 3 TIMES DAILY PRN
COMMUNITY

## 2022-01-04 NOTE — PROGRESS NOTES
Medication Management [address]  [phone] (phone)  [phone] (fax)    Ms. Jarod Lowe is a 64 y.o.  female with history of PE who presents today for anticoagulation monitoring and adjustment. Patient verifies current dosing regimen and tablet strength. No missed or extra doses. Patient denies s/s bleeding/bruising/swelling/SOB/chest pain  No blood in urine or stool. No dietary changes.-Patient was on a clear liquid diet x 4 days last week due to diverticulitis flare up  No changes in medication/OTC agents/Herbals.-Added Aciphex and Flexeril  No change in alcohol use or tobacco use. No change in activity level. Patient denies headaches/dizziness/lightheadedness/falls. No vomiting/diarrhea or acute illness. -Patient had a flare up of diverticulitis and had diarrhea x 4 days  No Procedures scheduled in the future at this time.-Patient may have back surgery. She has an appt on 1/7 to find out when/if it will be scheduled. She will call clinic after appt at 9 AM to let us know. Patient has been bridged with Lovenox in the past for recurrent PE's and is aware that she will need this again. Assessment:   Lab Results   Component Value Date    INR 3.30 (H) 01/04/2022    INR 2.70 (H) 12/14/2021    INR 3.30 (H) 11/22/2021     INR supratherapeutic   Recent Labs     01/04/22  1027   INR 3.30*         Plan:  Coumadin 5 mg x 1 then continue Coumadin 15 mg MWF, 10 mg TThSS. Recheck INR in 2 week(s). Patient reminded to call the Anticoagulation Clinic with any signs or symptoms of bleeding or with any medication changes. Patient given instructions utilizing the teach back method. After visit summary printed and reviewed with patient. Discharged ambulatory in no apparent distress.         For Pharmacy Admin Tracking Only     Intervention Detail: Dose Adjustment: 1, reason: Therapy Optimization   Total # of Interventions Recommended: 1   Total # of Interventions Accepted: 1   Time Spent (min): [address], PharmD, BCPS  1/4/2022  11:21 AM

## 2022-01-20 ENCOUNTER — HOSPITAL ENCOUNTER (OUTPATIENT)
Dept: PHARMACY | Age: 57
Setting detail: THERAPIES SERIES
Discharge: HOME | End: 2022-01-20
Payer: COMMERCIAL

## 2022-01-20 DIAGNOSIS — I26.99 PULMONARY EMBOLISM, OTHER, UNSPECIFIED CHRONICITY, UNSPECIFIED WHETHER ACUTE COR PULMONALE PRESENT (HCC): ICD-10-CM

## 2022-01-20 DIAGNOSIS — Z51.81 ENCOUNTER FOR THERAPEUTIC DRUG MONITORING: ICD-10-CM

## 2022-01-20 DIAGNOSIS — Z79.01 ANTICOAGULATED ON COUMADIN: Primary | ICD-10-CM

## 2022-01-20 LAB — POC INR: 4.6 (ref 0.8–1.2)

## 2022-01-20 PROCEDURE — 99212 OFFICE O/P EST SF 10 MIN: CPT | Performed by: PHARMACIST

## 2022-01-20 PROCEDURE — 85610 PROTHROMBIN TIME: CPT | Performed by: PHARMACIST

## 2022-01-20 PROCEDURE — 36416 COLLJ CAPILLARY BLOOD SPEC: CPT | Performed by: PHARMACIST

## 2022-01-20 NOTE — PROGRESS NOTES
Medication Management [address]  [phone] (phone)  [phone] (fax)    Ms. Dick Garcia is a 64 y.o.  female with history of PE who presents today for anticoagulation monitoring and adjustment. Patient verifies current dosing regimen and tablet strength. No missed or extra doses. Patient denies s/s bleeding/bruising/swelling/SOB/chest pain  No blood in urine or stool. No dietary changes. No changes in medication/OTC agents/Herbals. No change in alcohol use or tobacco use. Activity level much lower during diverticulitis flare. Stayed in bed for multiple days due to abdominal pain. Patient denies headaches/dizziness/lightheadedness/falls. No vomiting. Had another diverticulitis flare x 5 days due to foods, now back to normal.   March 31st will be having back surgery in [place], MD wants pt off Coumadin x 7 days. Will plan to bridge w/ Lovenox due to history of recurrent PEs. Assessment:   Lab Results   Component Value Date    INR 4.60 (H) 01/20/2022    INR 3.30 (H) 01/04/2022    INR 2.70 (H) 12/14/2021     INR supratherapeutic   Recent Labs     01/20/22  1024   INR 4.60*       Plan:  Hold Coumadin today, 5mg tomorrow, then continue Coumadin 15mg MWF and 10mg TThSaS. Recheck INR in 11 day(s). Patient reminded to call the Anticoagulation Clinic with any signs or symptoms of bleeding or with any medication changes. Patient given instructions utilizing the teach back method. After visit summary printed and reviewed with patient. Discharged ambulatory in no apparent distress.     For Pharmacy Admin Tracking Only     Intervention Detail: Dose Adjustment: 1, reason: Therapy De-escalation   Total # of Interventions Recommended: 1   Total # of Interventions Accepted: 1   Time Spent (min): 20

## 2022-01-31 ENCOUNTER — HOSPITAL ENCOUNTER (OUTPATIENT)
Dept: PHARMACY | Age: 57
Setting detail: THERAPIES SERIES
Discharge: HOME | End: 2022-01-31
Payer: COMMERCIAL

## 2022-01-31 ENCOUNTER — TELEPHONE (OUTPATIENT)
Dept: PHARMACY | Age: 57
End: 2022-01-31

## 2022-01-31 VITALS — BODY MASS INDEX: 37.92 KG/M2 | WEIGHT: 220.9 LBS

## 2022-01-31 DIAGNOSIS — I26.99 PULMONARY EMBOLISM, UNSPECIFIED CHRONICITY, UNSPECIFIED PULMONARY EMBOLISM TYPE, UNSPECIFIED WHETHER ACUTE COR PULMONALE PRESENT (HCC): ICD-10-CM

## 2022-01-31 DIAGNOSIS — Z51.81 ENCOUNTER FOR THERAPEUTIC DRUG MONITORING: ICD-10-CM

## 2022-01-31 DIAGNOSIS — Z79.01 ANTICOAGULATED ON COUMADIN: Primary | ICD-10-CM

## 2022-01-31 LAB — POC INR: 3.2 (ref 0.8–1.2)

## 2022-01-31 PROCEDURE — 85610 PROTHROMBIN TIME: CPT

## 2022-01-31 PROCEDURE — 36416 COLLJ CAPILLARY BLOOD SPEC: CPT

## 2022-01-31 PROCEDURE — 99212 OFFICE O/P EST SF 10 MIN: CPT

## 2022-01-31 RX ORDER — WARFARIN SODIUM 5 MG/1
TABLET ORAL
Qty: 270 TABLET | Refills: 3 | Status: SHIPPED | OUTPATIENT
Start: 2022-01-31 | End: 2022-03-04

## 2022-01-31 NOTE — TELEPHONE ENCOUNTER
Bridging instructions for 3/15/22: tentative on patient's most recent dosing regimen at the time.         Medication Management [address] [facility] Instruction Sheet  Patient:   Julio Carlson      YOB: 1965    Procedure:  Back Surgery        Date of Procedure:  3/15/22    Day Lovenox AM Lovenox PM Coumadin PM     3/10/22   none   none   none       3/11/22   100 mg   100 mg   none       3/12/22     100 mg   100 mg   none     3/13/22     100 mg   100 mg   none     3/12/22     100 mg   100 mg   none     3/13/22     100 mg   100 mg   none     3/14/22     100 mg   none   none     3/15/22     none   none   none     3/16/22     none   100 mg   20 mg     3/17/22     100 mg   100 mg   20 mg     3/18/22     100 mg   100 mg   15 mg     3/19/22     100 mg   100 mg   10 mg     3/20/22     100 mg   100 mg   10 mg     INR to be checked:  3/21/22  Courtney Levine, PharmD, BCPS  2/21/2022  Clinical Pharmacist/Date    Any questions please call [facility] at [phone]

## 2022-01-31 NOTE — PROGRESS NOTES
Medication Management [address]  [phone] (phone)  [phone] (fax)    Ms. Zeinab Reese is a 64 y.o.  female with history of PE who presents today for anticoagulation monitoring and adjustment. Patient verifies current dosing regimen and tablet strength. No missed or extra doses. Patient denies s/s bleeding/bruising/swelling/SOB/chest pain. No blood in urine or stool. No dietary changes. No changes in OTC agents/Herbals. Lidocaine and Percocet have not been working for her pain (continues to trial). Pt does not recall ever taking Aciphex & requested it be taken off her list - med list updated. No change in alcohol use or tobacco use. No change in activity level. Patient denies headaches/dizziness/lightheadedness/falls. No vomiting/diarrhea or acute illness. March 31st will be having back surgery in [facility], MD wants pt off Coumadin x 7 days. Will plan to bridge w/ Lovenox due to history of recurrent PEs. Obtained updated weight today and entered into chart for accurate Lovenox dosing. (bridging instructions created - see TC 1/31). Assessment:   Lab Results   Component Value Date    INR 3.20 (H) 01/31/2022    INR 4.60 (H) 01/20/2022    INR 3.30 (H) 01/04/2022     INR supratherapeutic   Recent Labs     01/31/22  1023   INR 3.20*   Supratherapeutic INR x 2    Plan:  Decrease Coumadin 15 mg MF and 10 mg TuWThSaSu (5.9% decrease). Recheck INR in 2 week(s). Patient reminded to call the Anticoagulation Clinic with signs or symptoms of bleeding or with any medication changes. Patient given instructions utilizing the teach back method. After visit summary printed and reviewed with patient. Discharged ambulatory in no apparent distress.     Frank Escobar PharmD  10:36 AM  1/31/2022      For Pharmacy Admin Tracking Only     Intervention Detail: Dose Adjustment: 1, reason: Therapy De-escalation   Total # of Interventions Recommended: 1   Total # of Interventions Accepted: 1   Time Spent (min): 20

## 2022-02-14 ENCOUNTER — HOSPITAL ENCOUNTER (OUTPATIENT)
Dept: PHARMACY | Age: 57
Setting detail: THERAPIES SERIES
Discharge: HOME | End: 2022-02-14
Payer: COMMERCIAL

## 2022-02-14 DIAGNOSIS — Z79.01 ANTICOAGULATED ON COUMADIN: Primary | ICD-10-CM

## 2022-02-14 DIAGNOSIS — I26.99 PULMONARY EMBOLISM WITHOUT ACUTE COR PULMONALE, UNSPECIFIED CHRONICITY, UNSPECIFIED PULMONARY EMBOLISM TYPE (HCC): ICD-10-CM

## 2022-02-14 DIAGNOSIS — Z51.81 ENCOUNTER FOR THERAPEUTIC DRUG MONITORING: ICD-10-CM

## 2022-02-14 LAB — POC INR: 3.7 (ref 0.8–1.2)

## 2022-02-14 PROCEDURE — 99212 OFFICE O/P EST SF 10 MIN: CPT

## 2022-02-14 PROCEDURE — 36416 COLLJ CAPILLARY BLOOD SPEC: CPT

## 2022-02-14 PROCEDURE — 85610 PROTHROMBIN TIME: CPT

## 2022-02-21 ENCOUNTER — TELEPHONE (OUTPATIENT)
Dept: PHARMACY | Age: 57
End: 2022-02-21

## 2022-02-21 NOTE — TELEPHONE ENCOUNTER
Updated bridge schedule from telephone call on 1/31/22.     Aundrea McwilliamsD, BCPS  2/21/2022  11:26 AM

## 2022-02-21 NOTE — TELEPHONE ENCOUNTER
Patient called and wanted you to know that her surgery has been moved up 3/15/22.   She has a appointment coming up  If you need to change anything call the patient

## 2022-02-28 ENCOUNTER — HOSPITAL ENCOUNTER (OUTPATIENT)
Dept: PHARMACY | Age: 57
Setting detail: THERAPIES SERIES
Discharge: HOME | End: 2022-02-28
Payer: COMMERCIAL

## 2022-02-28 DIAGNOSIS — Z79.01 ANTICOAGULATED ON COUMADIN: Primary | ICD-10-CM

## 2022-02-28 DIAGNOSIS — Z51.81 ENCOUNTER FOR THERAPEUTIC DRUG MONITORING: ICD-10-CM

## 2022-02-28 LAB — POC INR: 2.5 (ref 0.8–1.2)

## 2022-02-28 PROCEDURE — 85610 PROTHROMBIN TIME: CPT

## 2022-02-28 PROCEDURE — 99213 OFFICE O/P EST LOW 20 MIN: CPT

## 2022-02-28 PROCEDURE — 36416 COLLJ CAPILLARY BLOOD SPEC: CPT

## 2022-02-28 NOTE — PROGRESS NOTES
Medication Management [address]   [phone] (phone)  [phone] (fax)    Ms. Meghan Blount is a 64 y.o.  female with history of PE who presents today for anticoagulation monitoring and adjustment. Patient verifies current dosing regimen and tablet strength. No missed or extra doses. Patient denies s/s bleeding/bruising/swelling/SOB/chest pain  No blood in urine or stool. No dietary changes. No changes in medication/OTC agents/Herbals. No change in alcohol use or tobacco use. No change in activity level. Patient denies headaches/dizziness/lightheadedness/falls. No vomiting/diarrhea or acute illness. Patient is having back surgery on 3/15/22 and requires 7 day hold with Lovenox bridge per previous documentation. Assessment:   Lab Results   Component Value Date    INR 2.50 (H) 02/28/2022    INR 3.70 (H) 02/14/2022    INR 3.20 (H) 01/31/2022     INR therapeutic   Recent Labs     02/28/22  0958   INR 2.50*     Patient presents with first therapeutic INR following a recent dose adjustment. Plan:  Continue Coumadin 15 mg W and 10 mg MTuThFSaSu. Follow Lovenox bridging instructions below starting 3/8/22. E-scribed Lovenox 100 mg syringes #20 syringes with 0 refills to [facility] per patient request.  Sandy Poles INR in 3 week(s) (1 week after procedure). Patient reminded to call the Anticoagulation Clinic with any signs or symptoms of bleeding or with any medication changes. Patient given instructions utilizing the teach back method. After visit summary printed and reviewed with patient. Discharged ambulatory in no apparent distress.     Medication Management [address] [facility] Instruction Sheet  Patient:   Meghan Blount                                                               YOB: 1965     Procedure:  Back Surgery                                                                  Date of Procedure:  3/15/22     Day Lovenox AM Lovenox PM Coumadin PM      3/8/22    none    none    none         3/9/22    100 mg    100 mg    none         3/10/22       100 mg    100 mg    none      3/11/22       100 mg    100 mg    none      3/12/22       100 mg    100 mg    none      3/13/22       100 mg    100 mg    none      3/14/22       100 mg    none    none      3/15/22       none    none    none      3/16/22       none    100 mg    20 mg      3/17/22       100 mg    100 mg    20 mg      3/18/22       100 mg    100 mg    15 mg      3/19/22       100 mg    100 mg    10 mg      3/20/22       100 mg    100 mg    10 mg      INR to be checked:  3/21/22    For Pharmacy Admin Tracking Only     Intervention Detail: Dose Adjustment: 1, reason: Therapy Optimization and New Rx: 1, reason: Needs Additional Therapy Lovenox   Total # of Interventions Recommended: 2   Total # of Interventions Accepted: 2   Time Spent (min): [address], PharmD, BCPS  2/28/2022  10:26 AM

## 2022-03-04 RX ORDER — WARFARIN SODIUM 5 MG/1
TABLET ORAL
Qty: 200 TABLET | Refills: 3 | Status: SHIPPED | OUTPATIENT
Start: 2022-03-04

## 2022-03-07 ENCOUNTER — TELEPHONE (OUTPATIENT)
Dept: PHARMACY | Age: 57
End: 2022-03-07

## 2022-03-07 NOTE — TELEPHONE ENCOUNTER
Patient notified clinic that her hold instructions have been adjusted for procedure and her last dose of Coumadin will now be 3/9/22. See adjusted Lovenox bridge below. Patient voiced understanding. Patient given instructions utilizing the teach back method.       Medication Management [address] [place] [address] Instruction Sheet  Patient:   Aimee Perez                                                               Date of Birth:  1965     Procedure:  Back Surgery                                                                  Date of Procedure:  3/15/22     Day Lovenox AM Lovenox PM Coumadin PM      3/8/22    none    none    10 mg         3/9/22    none    none    15 mg         3/10/22       none    none    none      3/11/22       100 mg    100 mg    none      3/12/22       100 mg    100 mg    none      3/13/22       100 mg    100 mg    none      3/14/22       100 mg    none    none      3/15/22       none    none    none      3/16/22       none    100 mg    20 mg      3/17/22       100 mg    100 mg    20 mg      3/18/22       100 mg    100 mg    15 mg      3/19/22       100 mg    100 mg    10 mg      3/20/22       100 mg    100 mg    10 mg      INR to be checked:  3/21/22

## 2022-03-12 ENCOUNTER — HOSPITAL ENCOUNTER (OUTPATIENT)
Age: 57
Discharge: HOME | End: 2022-03-12
Payer: COMMERCIAL

## 2022-03-12 LAB
INFLUENZA A: NOT DETECTED
INFLUENZA B: NOT DETECTED
SARS-COV-2 RNA, RT PCR: NOT DETECTED

## 2022-03-12 PROCEDURE — 99211 OFF/OP EST MAY X REQ PHY/QHP: CPT

## 2022-03-12 PROCEDURE — 87636 SARSCOV2 & INF A&B AMP PRB: CPT

## 2022-03-14 ENCOUNTER — TELEPHONE (OUTPATIENT)
Dept: PHARMACY | Age: 57
End: 2022-03-14

## 2022-03-14 NOTE — TELEPHONE ENCOUNTER
Patient was questioning since she didn't take the Lovenox until ~ 2 AM last night if she should take it this morning. Instructed her to take the dose ASAP so that it can wear out of her system by the time of the procedure tomorrow. She agreed.      Ashley Awad, AundreaD, BCPS  3/14/2022  11:05 AM

## 2022-03-14 NOTE — TELEPHONE ENCOUNTER
----- Message from [employer] sent at 3/14/2022 10:41 AM EDT -----  Contact: [phone]  Aimee is having a procedure tomorrow. She's taking Lovenox. Has some questions due to missing dose and taking at a later time. ..and the time change.

## 2022-03-18 ENCOUNTER — TELEPHONE (OUTPATIENT)
Dept: PHARMACY | Age: 57
End: 2022-03-18

## 2022-03-18 NOTE — TELEPHONE ENCOUNTER
Dr. Sarina Hidalgo called the clinic and reported that her surgeon changed her post-operative anticoagulation plan following her back surgery on 3/15/22. Aimee held warfarin for 7 days pre-procedure and was bridging with Lovenox due to history of recurrent PEs. After sugery, per Aimee, she received 1 tablet (5mg of warfarin) and she was instructed to continue warfarin 5mg daily until her follow-up check on 3/21. They instructed Aimee to not take any Lovenox shots due to concern for bleeding. Aimee is concerned about blood clots. Aimee's maintenance warfarin dose is 10mg daily, except for 15mg on Wednesdays. Could we consider resuming Aimee's maintenance dose of 10mg daily for the weekend? Please message back and/or contact Aimee if you would like any change in her plan.      Thank you,    David Felix, PharmD, [facility]  Anticoagulation Clinic Pharmacist

## 2022-03-21 ENCOUNTER — HOSPITAL ENCOUNTER (OUTPATIENT)
Dept: PHARMACY | Age: 57
Setting detail: THERAPIES SERIES
Discharge: HOME | End: 2022-03-21
Payer: COMMERCIAL

## 2022-03-21 DIAGNOSIS — Z79.01 ANTICOAGULATED ON COUMADIN: Primary | ICD-10-CM

## 2022-03-21 DIAGNOSIS — I26.99 PULMONARY EMBOLISM, UNSPECIFIED CHRONICITY, UNSPECIFIED PULMONARY EMBOLISM TYPE, UNSPECIFIED WHETHER ACUTE COR PULMONALE PRESENT (HCC): ICD-10-CM

## 2022-03-21 DIAGNOSIS — Z51.81 ENCOUNTER FOR THERAPEUTIC DRUG MONITORING: ICD-10-CM

## 2022-03-21 LAB — POC INR: 1.1 (ref 0.8–1.2)

## 2022-03-21 PROCEDURE — 99212 OFFICE O/P EST SF 10 MIN: CPT

## 2022-03-21 PROCEDURE — 36416 COLLJ CAPILLARY BLOOD SPEC: CPT

## 2022-03-21 PROCEDURE — 85610 PROTHROMBIN TIME: CPT

## 2022-03-21 NOTE — PROGRESS NOTES
Medication Management [address]  [phone] (phone)  [phone] (fax)    Ms. Zeinab Reese is a 62 y.o.  female with history of PE who presents today for anticoagulation monitoring and adjustment. Patient verifies current dosing regimen and tablet strength. No missed or extra doses. -Patient called clinic on Friday (3/18) to report that her surgeon changed her post operative anticoagulation plan. She was instructed to take Coumadin 5 mg daily post discharge (starting 3/18) and STOP the Lovenox due to concern for bleeding. Forwarded this information to Dr Doug Ybarra who stated \"I can not contradict her surgeon  Nonetheless - I feel it is prudent to resume her maintenance dose\"  Patient denies s/s bleeding/bruising/swelling/SOB/chest pain  No blood in urine or stool. No dietary changes. No changes in medication/OTC agents/Herbals. No change in alcohol use or tobacco use. No change in activity level. Patient denies headaches/dizziness/lightheadedness/falls. No vomiting/diarrhea or acute illness. No Procedures scheduled in the future at this time. Assessment:   Lab Results   Component Value Date    INR 1.10 03/21/2022    INR 2.50 (H) 02/28/2022    INR 3.70 (H) 02/14/2022     INR subtherapeutic   Recent Labs     03/21/22  1019   INR 1.10     Will dose less aggressively due to surgeons concerns for risk of bleeding; however, will resume a higher dose than instructed post surgery due to cardiologists concern for risk of clotting. Plan:  Coumadin 15 mg x 3 then continue Coumadin 15 mg W, 10 mg MTThFSS. Recheck INR in 1 week(s). Patient reminded to call the Anticoagulation Clinic with any signs or symptoms of bleeding or with any medication changes. Patient given instructions utilizing the teach back method. After visit summary printed and reviewed with patient. Discharged ambulatory in no apparent distress.         For Pharmacy Admin Tracking Only     Intervention Detail: Dose Adjustment: 1, reason: Therapy Optimization   Total # of Interventions Recommended: 1   Total # of Interventions Accepted: 1   Time Spent (min): [address] PharmD, BCPS  3/21/2022  11:07 AM

## 2022-03-23 ENCOUNTER — HOSPITAL ENCOUNTER (EMERGENCY)
Age: 57
Discharge: HOME | End: 2022-03-23
Attending: EMERGENCY MEDICINE
Payer: COMMERCIAL

## 2022-03-23 ENCOUNTER — APPOINTMENT (OUTPATIENT)
Dept: INTERVENTIONAL RADIOLOGY/VASCULAR | Age: 57
End: 2022-03-23
Payer: COMMERCIAL

## 2022-03-23 ENCOUNTER — TELEPHONE (OUTPATIENT)
Dept: PHARMACY | Age: 57
End: 2022-03-23

## 2022-03-23 VITALS
HEART RATE: 90 BPM | HEIGHT: 64 IN | BODY MASS INDEX: 37.56 KG/M2 | SYSTOLIC BLOOD PRESSURE: 120 MMHG | OXYGEN SATURATION: 99 % | DIASTOLIC BLOOD PRESSURE: 86 MMHG | RESPIRATION RATE: 20 BRPM | WEIGHT: 220 LBS | TEMPERATURE: 99.9 F

## 2022-03-23 DIAGNOSIS — D64.9 ANEMIA, UNSPECIFIED TYPE: ICD-10-CM

## 2022-03-23 DIAGNOSIS — M79.604 PAIN IN BOTH LOWER EXTREMITIES: Primary | ICD-10-CM

## 2022-03-23 DIAGNOSIS — M79.605 PAIN IN BOTH LOWER EXTREMITIES: Primary | ICD-10-CM

## 2022-03-23 LAB
ALBUMIN SERPL-MCNC: 3.5 G/DL (ref 3.5–5.1)
ALP BLD-CCNC: 83 U/L (ref 38–126)
ALT SERPL-CCNC: 20 U/L (ref 11–66)
ANION GAP SERPL CALCULATED.3IONS-SCNC: 14 MEQ/L (ref 8–16)
AST SERPL-CCNC: 26 U/L (ref 5–40)
BASOPHILS # BLD: 0.1 %
BASOPHILS ABSOLUTE: 0 THOU/MM3 (ref 0–0.1)
BILIRUB SERPL-MCNC: 0.3 MG/DL (ref 0.3–1.2)
BUN BLDV-MCNC: 20 MG/DL (ref 7–22)
CALCIUM SERPL-MCNC: 9.5 MG/DL (ref 8.5–10.5)
CHLORIDE BLD-SCNC: 101 MEQ/L (ref 98–111)
CO2: 23 MEQ/L (ref 23–33)
CREAT SERPL-MCNC: 0.9 MG/DL (ref 0.4–1.2)
EOSINOPHIL # BLD: 1.8 %
EOSINOPHILS ABSOLUTE: 0.1 THOU/MM3 (ref 0–0.4)
ERYTHROCYTE [DISTWIDTH] IN BLOOD BY AUTOMATED COUNT: 15.7 % (ref 11.5–14.5)
ERYTHROCYTE [DISTWIDTH] IN BLOOD BY AUTOMATED COUNT: 51.1 FL (ref 35–45)
GFR SERPL CREATININE-BSD FRML MDRD: 78 ML/MIN/1.73M2
GLUCOSE BLD-MCNC: 107 MG/DL (ref 70–108)
HCT VFR BLD CALC: 31.3 % (ref 37–47)
HEMOGLOBIN: 9.9 GM/DL (ref 12–16)
IMMATURE GRANS (ABS): 0.04 THOU/MM3 (ref 0–0.07)
IMMATURE GRANULOCYTES: 0.6 %
INR BLD: 1.61 (ref 0.85–1.13)
LYMPHOCYTES # BLD: 19.7 %
LYMPHOCYTES ABSOLUTE: 1.4 THOU/MM3 (ref 1–4.8)
MCH RBC QN AUTO: 28 PG (ref 26–33)
MCHC RBC AUTO-ENTMCNC: 31.6 GM/DL (ref 32.2–35.5)
MCV RBC AUTO: 88.7 FL (ref 81–99)
MONOCYTES # BLD: 8.2 %
MONOCYTES ABSOLUTE: 0.6 THOU/MM3 (ref 0.4–1.3)
NUCLEATED RED BLOOD CELLS: 0 /100 WBC
OSMOLALITY CALCULATION: 278.8 MOSMOL/KG (ref 275–300)
PLATELET # BLD: 230 THOU/MM3 (ref 130–400)
PMV BLD AUTO: 10 FL (ref 9.4–12.4)
POTASSIUM SERPL-SCNC: 4.7 MEQ/L (ref 3.5–5.2)
RBC # BLD: 3.53 MILL/MM3 (ref 4.2–5.4)
SEG NEUTROPHILS: 69.6 %
SEGMENTED NEUTROPHILS ABSOLUTE COUNT: 4.8 THOU/MM3 (ref 1.8–7.7)
SODIUM BLD-SCNC: 138 MEQ/L (ref 135–145)
TOTAL CK: 300 U/L (ref 30–135)
TOTAL PROTEIN: 6.9 G/DL (ref 6.1–8)
WBC # BLD: 6.9 THOU/MM3 (ref 4.8–10.8)

## 2022-03-23 PROCEDURE — 36415 COLL VENOUS BLD VENIPUNCTURE: CPT

## 2022-03-23 PROCEDURE — 93970 EXTREMITY STUDY: CPT

## 2022-03-23 PROCEDURE — 96372 THER/PROPH/DIAG INJ SC/IM: CPT

## 2022-03-23 PROCEDURE — 85610 PROTHROMBIN TIME: CPT

## 2022-03-23 PROCEDURE — 82550 ASSAY OF CK (CPK): CPT

## 2022-03-23 PROCEDURE — 85025 COMPLETE CBC W/AUTO DIFF WBC: CPT

## 2022-03-23 PROCEDURE — 80053 COMPREHEN METABOLIC PANEL: CPT

## 2022-03-23 PROCEDURE — 6360000002 HC RX W HCPCS: Performed by: EMERGENCY MEDICINE

## 2022-03-23 PROCEDURE — 99284 EMERGENCY DEPT VISIT MOD MDM: CPT

## 2022-03-23 RX ORDER — LORAZEPAM 2 MG/ML
1 INJECTION INTRAMUSCULAR ONCE
Status: COMPLETED | OUTPATIENT
Start: 2022-03-23 | End: 2022-03-23

## 2022-03-23 RX ADMIN — LORAZEPAM 1 MG: 2 INJECTION INTRAMUSCULAR; INTRAVENOUS at 12:24

## 2022-03-23 ASSESSMENT — PAIN DESCRIPTION - LOCATION: LOCATION: LEG

## 2022-03-23 ASSESSMENT — PAIN DESCRIPTION - DESCRIPTORS: DESCRIPTORS: ACHING;CRAMPING

## 2022-03-23 ASSESSMENT — PAIN SCALES - GENERAL: PAINLEVEL_OUTOF10: 8

## 2022-03-23 ASSESSMENT — PAIN - FUNCTIONAL ASSESSMENT: PAIN_FUNCTIONAL_ASSESSMENT: 0-10

## 2022-03-23 ASSESSMENT — PAIN DESCRIPTION - PAIN TYPE: TYPE: ACUTE PAIN

## 2022-03-23 NOTE — ED NOTES
Patient to ED for bilateral leg pain. Patient states she had recent back surgery. Patient has been off coumadin for about two weeks. Patient states pain started Saturday.  Patient has hx of Diana Maldonado RN  03/23/22 9657

## 2022-03-23 NOTE — TELEPHONE ENCOUNTER
Noted patient was in ER today for leg pain - negative scan for DVT. INR there 1.61 - physician's note reads to double Coumadin dose today. See clinic visit note from 2 days ago. Currently on clinic schedule 3/28.   Message forwarded to clinic pharmacist.

## 2022-03-23 NOTE — ED PROVIDER NOTES
AND CURETTAGE performed by Mark Vincent DO at [facility]      right wrist for tendonitis    TOE SURGERY  2019    LEFT SECOND TOE    VENA CAVA FILTER PLACEMENT         CURRENT MEDICATIONS       Previous Medications    ACETAMINOPHEN (TYLENOL PO)    Take 1,000 mg by mouth as needed Indications: Pain Don't take more than 3,000 mg per day, including what's in Percocet. ALBUTEROL SULFATE (VENTOLIN HFA IN)    Inhale 2 puffs into the lungs every 4 hours as needed Indications: Asthma     ALENDRONATE (FOSAMAX) 70 MG TABLET    Take 70 mg by mouth every 28 days     ATORVASTATIN (LIPITOR) 20 MG TABLET    Take 20 mg by mouth daily Indications: Blood Cholesterol Abnormal    CALTRATE 600+D PLUS MINERALS (CALTRATE) 600-800 MG-UNIT TABS TABLET    Take 2 tablets by mouth daily     CYCLOBENZAPRINE (FLEXERIL) 10 MG TABLET    Take 10 mg by mouth 3 times daily as needed for Muscle spasms    LIDOCAINE (LIDODERM) 5 %    Place 1 patch onto the skin daily 12 hours on, 12 hours off. LISINOPRIL (PRINIVIL;ZESTRIL) 5 MG TABLET    Take 5 mg by mouth daily Indications: Diabetes, Raised Blood Pressure    METFORMIN (GLUCOPHAGE) 500 MG TABLET    Take 500 mg by mouth 2 times daily (with meals)     MONTELUKAST (SINGULAIR) 10 MG TABLET    Take 10 mg by mouth daily    OXYCODONE-ACETAMINOPHEN (PERCOCET) 5-325 MG PER TABLET    Take 1 tablet by mouth every 6 hours as needed for Pain. Indications: Pain     TIZANIDINE (ZANAFLEX) 4 MG TABLET    Take 4 mg by mouth every 8 hours as needed (spasms)    TRIAMCINOLONE (NASACORT) 55 MCG/ACT NASAL INHALER    1 spray by Nasal route daily In each nostril    WARFARIN (COUMADIN) 5 MG TABLET    TAKE AS DIRECTED BY COUMADIN CLINIC 200 tabs = 90 days       ALLERGIES     Patient has no known allergies. FAMILY HISTORY     She indicated that her mother is . She indicated that her father is . She indicated that her sister is alive.  She indicated that her brother is . She indicated that the status of her maternal grandmother is unknown.   family history includes Cancer in her mother; Diabetes in her brother; High Blood Pressure in her sister; High Cholesterol in her sister; Stroke in her maternal grandmother. SOCIAL HISTORY      reports that she has never smoked. She has never used smokeless tobacco. She reports current alcohol use. She reports that she does not use drugs. PHYSICAL EXAM      height is 5' 4\" (1.626 m) and weight is 220 lb (99.8 kg). Her oral temperature is 99.9 °F (37.7 °C). Her blood pressure is 120/86 and her pulse is 90. Her respiration is 20 and oxygen saturation is 99%. Physical Exam  Vitals and nursing note reviewed. Constitutional:       Appearance: She is well-developed. She is not diaphoretic. HENT:      Head: Normocephalic and atraumatic. Nose: Nose normal.   Eyes:      General: No scleral icterus. Right eye: No discharge. Left eye: No discharge. Conjunctiva/sclera: Conjunctivae normal.      Pupils: Pupils are equal, round, and reactive to light. Neck:      Vascular: No JVD. Trachea: No tracheal deviation. Cardiovascular:      Rate and Rhythm: Normal rate and regular rhythm. Heart sounds: Normal heart sounds. No murmur heard. No friction rub. No gallop. Pulmonary:      Effort: Pulmonary effort is normal. No respiratory distress. Breath sounds: Normal breath sounds. No stridor. No wheezing or rales. Chest:      Chest wall: No tenderness. Abdominal:      General: Bowel sounds are normal. There is no distension. Palpations: Abdomen is soft. There is no mass. Tenderness: There is no abdominal tenderness. There is no guarding or rebound. Hernia: No hernia is present. Musculoskeletal:         General: Tenderness present. No deformity. Cervical back: Normal range of motion and neck supple.       Comments: Mild tenderness to bilateral thighs, greater to left side, no bilateral lower extremity swelling. Intact neurovascular exam to bilateral lower extremities. Lower back surgical incision clean dry and intact. Lymphadenopathy:      Cervical: No cervical adenopathy. Skin:     General: Skin is warm and dry. Capillary Refill: Capillary refill takes less than 2 seconds. Coloration: Skin is not pale. Findings: No erythema or rash. Neurological:      Mental Status: She is alert and oriented to person, place, and time. Cranial Nerves: No cranial nerve deficit. Sensory: No sensory deficit. Motor: No abnormal muscle tone. Coordination: Coordination normal.      Deep Tendon Reflexes: Reflexes normal.   Psychiatric:         Behavior: Behavior normal.         Thought Content: Thought content normal.         Judgment: Judgment normal.       ANCILLARY TEST RESULTS   EKG:    Interpreted by me  Not indicated    LAB RESULTS:  Results for orders placed or performed during the hospital encounter of 03/23/22   CBC with Auto Differential   Result Value Ref Range    WBC 6.9 4.8 - 10.8 thou/mm3    RBC 3.53 (L) 4.20 - 5.40 mill/mm3    Hemoglobin 9.9 (L) 12.0 - 16.0 gm/dl    Hematocrit 31.3 (L) 37.0 - 47.0 %    MCV 88.7 81.0 - 99.0 fL    MCH 28.0 26.0 - 33.0 pg    MCHC 31.6 (L) 32.2 - 35.5 gm/dl    RDW-CV 15.7 (H) 11.5 - 14.5 %    RDW-SD 51.1 (H) 35.0 - 45.0 fL    Platelets 221 672 - 537 thou/mm3    MPV 10.0 9.4 - 12.4 fL    Seg Neutrophils 69.6 %    Lymphocytes 19.7 %    Monocytes 8.2 %    Eosinophils 1.8 %    Basophils 0.1 %    Immature Granulocytes 0.6 %    Segs Absolute 4.8 1.8 - 7.7 thou/mm3    Lymphocytes Absolute 1.4 1.0 - 4.8 thou/mm3    Monocytes Absolute 0.6 0.4 - 1.3 thou/mm3    Eosinophils Absolute 0.1 0.0 - 0.4 thou/mm3    Basophils Absolute 0.0 0.0 - 0.1 thou/mm3    Immature Grans (Abs) 0.04 0.00 - 0.07 thou/mm3    nRBC 0 /100 wbc   Comprehensive Metabolic Panel   Result Value Ref Range    Glucose 107 70 - 108 mg/dL    CREATININE 0.9 0.4 - 1.2 mg/dL    BUN 20 7 - 22 mg/dL    Sodium 138 135 - 145 meq/L    Potassium 4.7 3.5 - 5.2 meq/L    Chloride 101 98 - 111 meq/L    CO2 23 23 - 33 meq/L    Calcium 9.5 8.5 - 10.5 mg/dL    AST 26 5 - 40 U/L    Alkaline Phosphatase 83 38 - 126 U/L    Total Protein 6.9 6.1 - 8.0 g/dL    Albumin 3.5 3.5 - 5.1 g/dL    Total Bilirubin 0.3 0.3 - 1.2 mg/dL    ALT 20 11 - 66 U/L   Protime-INR   Result Value Ref Range    INR 1.61 (H) 0.85 - 1.13   CK   Result Value Ref Range    Total  (H) 30 - 135 U/L   Anion Gap   Result Value Ref Range    Anion Gap 14.0 8.0 - 16.0 meq/L   Glomerular Filtration Rate, Estimated   Result Value Ref Range    Est, Glom Filt Rate 78 (A) ml/min/1.73m2   Osmolality   Result Value Ref Range    Osmolality Calc 278.8 275.0 - 300.0 mOsmol/kg       RADIOLOGY REPORTS  VL DUP LOWER EXTREMITY VENOUS BILATERAL   Final Result   No evidence of a DVT. **This report has been created using voice recognition software. It may contain minor errors which are inherent in voice recognition technology. **      Final report electronically signed by Dr Marquis Alfaro on 3/23/2022 2:22 PM          MEDICAL Jennifer Mercado [id number] ([facility])     Differential Diagnosis: DVT, mild rhabdomyolysis, muscle spasm, electrolyte imbalance    Initial Plans:   Large-bore IV  Labs  Bilateral lower extremity DVT study  IM lorazepam    Summary:    Pain is better on reassessment. Labs are reviewed: Hemoglobin 8.9 (baseline hemoglobin 12.4), this could be the result of her recent surgery. , which could explain patient's pain, patient is on statin, I recommend repeating CPK in a week to decide if statin needs to be discontinued. Bilateral lower extremity venous duplex negative for DVT. INR 1.61, I recommend a double dosage of Coumadin tonight. Patient is reassured, discharged with PCP follow-up in 1 week to recheck CBC, INR and CPK.   Patient is on Coumadin, I am reluctant to give her more muscle relaxants due to drug drug interaction with Coumadin. ED Medications  Medications   LORazepam (ATIVAN) injection 1 mg (1 mg IntraMUSCular Given 3/23/22 1224)     Vital signs in ED  Vitals:    03/23/22 1115 03/23/22 1220 03/23/22 1228 03/23/22 1415   BP:  132/86 126/82 120/86   Pulse:  97 94 90   Resp:  20 20 20   Temp: 99.9 °F (37.7 °C)      TempSrc: Oral      SpO2:  98% 100% 99%   Weight:  220 lb (99.8 kg)     Height:  5' 4\" (1.626 m)         CRITICAL CARE   None    CONSULTS   None    PROCEDURES   None    FINAL IMPRESSION AND DISPOSITION      1. Pain in both lower extremities    2.  Anemia, unspecified type        DISPOSITION Decision To Discharge 03/23/2022 03:38:40 PM      PATIENT REFERRED TO:  LUIZ Owusu - [facility]  [address] [phone]    In 1 week  ED discharge follow up      [address] [place] [address]:  New Prescriptions    No medications on file       (Please note that portions of this note were completed with a voice recognition program.  Efforts were made to edit the dictations but occasionally words aremis-transcribed.)    MD Marquise Waters MD  03/23/22 9546

## 2022-03-28 ENCOUNTER — HOSPITAL ENCOUNTER (OUTPATIENT)
Dept: PHARMACY | Age: 57
Setting detail: THERAPIES SERIES
Discharge: HOME | End: 2022-03-28
Payer: COMMERCIAL

## 2022-03-28 DIAGNOSIS — Z79.01 ANTICOAGULATED ON COUMADIN: Primary | ICD-10-CM

## 2022-03-28 DIAGNOSIS — Z51.81 ENCOUNTER FOR THERAPEUTIC DRUG MONITORING: ICD-10-CM

## 2022-03-28 LAB — POC INR: 2.5 (ref 0.8–1.2)

## 2022-03-28 PROCEDURE — 36416 COLLJ CAPILLARY BLOOD SPEC: CPT

## 2022-03-28 PROCEDURE — 85610 PROTHROMBIN TIME: CPT

## 2022-03-28 PROCEDURE — 99211 OFF/OP EST MAY X REQ PHY/QHP: CPT

## 2022-03-28 NOTE — PROGRESS NOTES
Medication Management [address]   [phone] (phone)  [phone] (fax)    Ms. Ben Jarvis is a 62 y.o.  female with history of PE who presents today for anticoagulation monitoring and adjustment. Patient verifies current dosing regimen and tablet strength. No missed or extra doses. Patient denies s/s bleeding/swelling/SOB/chest pain. Patient reports baseline bruising that is normal per patient. No blood in urine or stool. No dietary changes. No changes in medication/OTC agents/Herbals. No change in alcohol use or tobacco use. No change in activity level. Patient denies headaches/dizziness/lightheadedness/falls. No vomiting/diarrhea or acute illness. No Procedures scheduled in the future at this time. Assessment:   Lab Results   Component Value Date    INR 2.50 (H) 03/28/2022    INR 1.61 (H) 03/23/2022    INR 1.10 03/21/2022     INR therapeutic   Recent Labs     03/28/22  1011   INR 2.50*     Patient presents with first therapeutic INR following procedure. Previously, patient was undergoing several dose adjustments and had one therapeutic level prior to procedure. Plan:  Continue Coumadin 15 mg W and 10 mg MTuThFSaSu. Recheck INR in 10 day(s). Patient reminded to call the Anticoagulation Clinic with any signs or symptoms of bleeding or with any medication changes. Patient given instructions utilizing the teach back method. After visit summary printed and reviewed with patient. Discharged ambulatory in no apparent distress.     For Pharmacy Admin Tracking Only     Total # of Interventions Recommended: 0   Total # of Interventions Accepted: 0   Time Spent (min): 6964  [facility], [place]az, BCPS  3/28/2022  10:29 AM

## 2022-04-07 ENCOUNTER — HOSPITAL ENCOUNTER (OUTPATIENT)
Dept: PHARMACY | Age: 57
Setting detail: THERAPIES SERIES
Discharge: HOME | End: 2022-04-07
Payer: COMMERCIAL

## 2022-04-07 DIAGNOSIS — I26.99 PULMONARY EMBOLISM, UNSPECIFIED CHRONICITY, UNSPECIFIED PULMONARY EMBOLISM TYPE, UNSPECIFIED WHETHER ACUTE COR PULMONALE PRESENT (HCC): ICD-10-CM

## 2022-04-07 DIAGNOSIS — Z79.01 ANTICOAGULATED ON COUMADIN: Primary | ICD-10-CM

## 2022-04-07 DIAGNOSIS — I82.4Z9 DEEP VEIN THROMBOSIS (DVT) OF DISTAL VEIN OF LOWER EXTREMITY, UNSPECIFIED CHRONICITY, UNSPECIFIED LATERALITY (HCC): ICD-10-CM

## 2022-04-07 DIAGNOSIS — Z51.81 ENCOUNTER FOR THERAPEUTIC DRUG MONITORING: ICD-10-CM

## 2022-04-07 LAB — POC INR: 3.7 (ref 0.8–1.2)

## 2022-04-07 PROCEDURE — 85610 PROTHROMBIN TIME: CPT

## 2022-04-07 PROCEDURE — 99212 OFFICE O/P EST SF 10 MIN: CPT

## 2022-04-07 PROCEDURE — 36416 COLLJ CAPILLARY BLOOD SPEC: CPT

## 2022-04-07 NOTE — PROGRESS NOTES
Medication Management [address]  [phone] (phone)  [phone] (fax)    Ms. Cookie Lara is a 62 y.o.  female with history of PE/DVT, per referral from ANTONIA Guzman,  who presents today for Warfarin monitoring and adjustment (10 day visit). Patient verifies current dosing regimen and tablet strength. No missed or extra doses. Patient denies bleeding/bruising/chest pain. Had SOB at bedtime 2 nights - thought it was anxiety. Using incentive spirometer. Had swelling of ankles/calves for 3 days - relieved with propping them up. Has compression hose. No blood in urine or stool. Dietary changes: appetite decreased - hasn't had usual salad or cabbage past 10 days. States hasn't felt well since back surgery last month (advised to let PCP and/or surgeon know). Wearing soft back brace. No changes in medication/OTC agents/herbals. Hasn't used Percocet or Flexeril past 4 days. No change in alcohol use or tobacco use. No change in activity level. Had more stress. Patient denies dizziness/lightheadedness/falls. Took Tylenol for 2 headaches. No vomiting or acute illness. Had 2 episodes of diarrhea yesterday (plus several other regular stools); recommended Imodium. Explained why Pepto-Bismol should be avoided. States has used it in past for upset stomach - stated she'll use Tums, instead. No procedures scheduled in the future at this time. Sees surgeon 4/20 and PCP 4/22. Assessment:   Lab Results   Component Value Date    INR 3.70 (H) 04/07/2022    INR 2.50 (H) 03/28/2022    INR 1.61 (H) 03/23/2022     INR supratherapeutic - goal 2-3. Recent Labs     04/07/22  1040   INR 3.70*     Plan:  POCT INR ordered/performed/result reviewed. Hold today, Th, then continue PO Coumadin 15 mg W, 10 mg MTThFSS. Recheck INR in 2 week(s). (Report given - orders entered by DERRICK Multani, PharmD.) Patient reminded to call the Anticoagulation Clinic with any

## 2022-04-21 ENCOUNTER — HOSPITAL ENCOUNTER (OUTPATIENT)
Dept: PHARMACY | Age: 57
Setting detail: THERAPIES SERIES
Discharge: HOME | End: 2022-04-21
Payer: COMMERCIAL

## 2022-04-21 DIAGNOSIS — Z51.81 ENCOUNTER FOR THERAPEUTIC DRUG MONITORING: ICD-10-CM

## 2022-04-21 DIAGNOSIS — Z79.01 ANTICOAGULATED ON COUMADIN: ICD-10-CM

## 2022-04-21 DIAGNOSIS — I26.99 PULMONARY EMBOLISM, OTHER, UNSPECIFIED CHRONICITY, UNSPECIFIED WHETHER ACUTE COR PULMONALE PRESENT (HCC): Primary | ICD-10-CM

## 2022-04-21 LAB — POC INR: 3.3 (ref 0.8–1.2)

## 2022-04-21 PROCEDURE — 85610 PROTHROMBIN TIME: CPT | Performed by: PHARMACIST

## 2022-04-21 PROCEDURE — 99212 OFFICE O/P EST SF 10 MIN: CPT | Performed by: PHARMACIST

## 2022-04-21 PROCEDURE — 36416 COLLJ CAPILLARY BLOOD SPEC: CPT | Performed by: PHARMACIST

## 2022-04-21 NOTE — PROGRESS NOTES
Medication Management [address]  [phone] (phone)  [phone] (fax)    Ms. Alvin Prater is a 62 y.o.  female with history of DVT, PE who presents today for anticoagulation monitoring and adjustment. Patient verifies current dosing regimen and tablet strength. No missed or extra doses. Patient denies s/s bleeding/bruising/swelling/SOB/chest pain  No blood in urine or stool. Appetite is better, eating slightly more greens, not back to usual amount yet, planning to get back to that. No changes in medication/OTC agents/Herbals. No change in alcohol use or tobacco use. One small masha a few days ago. No change in activity level. Will be starting physical rehab in June. Patient denies headaches/dizziness/lightheadedness/falls. No vomiting/diarrhea or acute illness. No Procedures scheduled in the future at this time. Assessment:   Lab Results   Component Value Date    INR 3.30 (H) 04/21/2022    INR 3.70 (H) 04/07/2022    INR 2.50 (H) 03/28/2022     INR supratherapeutic   Recent Labs     04/21/22  1126   INR 3.30*         Plan:  Coumadin 5mg today, then decrease to 10mg daily. Recheck INR in 2 week(s). Patient reminded to call the Anticoagulation Clinic with any signs or symptoms of bleeding or with any medication changes. Patient given instructions utilizing the teach back method. After visit summary printed and reviewed with patient. Discharged ambulatory in no apparent distress.       For Pharmacy Admin Tracking Only     Intervention Detail: Dose Adjustment: 1, reason: Therapy De-escalation   Total # of Interventions Recommended: 1   Total # of Interventions Accepted: 1   Time Spent (min): 20

## 2022-05-05 ENCOUNTER — HOSPITAL ENCOUNTER (OUTPATIENT)
Dept: PHARMACY | Age: 57
Setting detail: THERAPIES SERIES
Discharge: HOME | End: 2022-05-05
Payer: COMMERCIAL

## 2022-05-05 DIAGNOSIS — I26.99 PULMONARY EMBOLISM, UNSPECIFIED CHRONICITY, UNSPECIFIED PULMONARY EMBOLISM TYPE, UNSPECIFIED WHETHER ACUTE COR PULMONALE PRESENT (HCC): Primary | ICD-10-CM

## 2022-05-05 DIAGNOSIS — Z51.81 ENCOUNTER FOR THERAPEUTIC DRUG MONITORING: ICD-10-CM

## 2022-05-05 DIAGNOSIS — Z79.01 ANTICOAGULATED ON COUMADIN: ICD-10-CM

## 2022-05-05 DIAGNOSIS — I82.4Z9 DEEP VEIN THROMBOSIS (DVT) OF DISTAL VEIN OF LOWER EXTREMITY, UNSPECIFIED CHRONICITY, UNSPECIFIED LATERALITY (HCC): ICD-10-CM

## 2022-05-05 LAB — POC INR: 1.7 (ref 0.8–1.2)

## 2022-05-05 PROCEDURE — 85610 PROTHROMBIN TIME: CPT

## 2022-05-05 PROCEDURE — 36416 COLLJ CAPILLARY BLOOD SPEC: CPT

## 2022-05-05 PROCEDURE — 99213 OFFICE O/P EST LOW 20 MIN: CPT

## 2022-05-05 RX ORDER — ENOXAPARIN SODIUM 100 MG/ML
100 INJECTION SUBCUTANEOUS EVERY 12 HOURS
Qty: 6 EACH | Refills: 0 | Status: SHIPPED | OUTPATIENT
Start: 2022-05-05 | End: 2022-05-26 | Stop reason: ALTCHOICE

## 2022-05-05 RX ORDER — HYDROXYZINE 50 MG/1
TABLET, FILM COATED ORAL
COMMUNITY
Start: 2022-04-22

## 2022-05-05 NOTE — PROGRESS NOTES
ABW:  99.8kg  Calculated CrCl: >90ml/min  Plt:  230  Lovenox dose:  100mg SQ Q12h    Medication Management [address] Instruction Sheet  Patient:   Leana Ganser      YOB: 1965    Procedure:  colonscopy        Date of Procedure:  5/13/22    Day Lovenox AM Lovenox PM Coumadin PM     5/8/22     none   none   none     5/9/22   100mg   100mg   none       5/10/22   100mg   100mg   none       5/11/22     100mg   100mg   none     5/12/22     100mg   none   none     5/13/22  (procedure)     none   none   none     5/14/22     none   100mg   15mg     5/15/22     100mg   100mg   15mg     5/16/22     100mg   100mg   15mg     5/17/22     100mg   100mg   10mg     5/18/22     100mg   100mg   10mg             INR to be checked:  5/19/22  Lyod Cruz [facility]/5/5/22    Clinical Pharmacist/Date    Any questions please call [facility] at [phone]

## 2022-05-05 NOTE — PATIENT INSTRUCTIONS
Medication Management [address] [place] [address] Instruction Sheet  Patient:   Felix Preciado      YOB: 1965    Procedure:  colonscopy        Date of Procedure:  5/13/22    Day Lovenox AM Lovenox PM Coumadin PM     5/8/22     none   none   none     5/9/22   100mg   100mg   none       5/10/22   100mg   100mg   none       5/11/22     100mg   100mg   none     5/12/22     100mg   none   none     5/13/22  (procedure)     none   none   none     5/14/22     none   100mg   15mg     5/15/22     100mg   100mg   15mg     5/16/22     100mg   100mg   15mg     5/17/22     100mg   100mg   10mg     5/18/22     100mg   100mg   10mg             INR to be checked:  5/19/22  Selin Parnell [facility]/5/5/22

## 2022-05-05 NOTE — PROGRESS NOTES
Medication Management [address]  [phone] (phone)  [phone] (fax)    Ms. Jarek Maki is a 62 y.o.  female with history of PE/DVT, per referral from ANTONIA Guzman, who presents today for Warfarin monitoring and adjustment (2 week visit after decreasing dose by 5 mg/week). Patient verifies current dosing regimen and tablet strength. No missed or extra doses. Patient denies bleeding/bruising/SOB/chest pain. Had more swelling of ankles, left>right (old fracture in both). No blood in urine or stool. Dietary changes: eating more salads, plus occasional ritchie or mustard greens - this is her usual diet. Appetite back to normal  No changes in medication/OTC agents/herbals. No change in alcohol use or tobacco use. Change in activity level: walking more - will probably increase further. Patient denies headaches/dizziness/lightheadedness/falls. No vomiting/diarrhea or acute illness. Procedures scheduled in the future at this time: 5/13 colonoscopy per Dr. Abdulaziz Shabazz. Apparently had stool occult blood positive - unsure if from hemorrhoid. Brought in instructions - given to RMA to copy, then scan; hold Coumadin 5 days prior. Recently had Lovenox for back surgery - still has unopened box. Remembers how to use it. Assessment:   Lab Results   Component Value Date    INR 1.70 (H) 05/05/2022    INR 3.30 (H) 04/21/2022    INR 3.70 (H) 04/07/2022     INR subtherapeutic - goal 2-3. Recent Labs     05/05/22  0915   INR 1.70*       Plan:  POCT INR ordered/performed/result reviewed. 15 mg today, then continue Coumadin 10 mg daily PO; hold 5/8 thru 5/13, then 15 mg for 3 days, then usual dose. See Lovenox instructions. Recheck INR 5/19. (Report given - orders entered by DERRICK Oseguera, PharmD., who also sent in prescription for more Lovenox.)  Patient reminded to call the Anticoagulation Clinic with any signs or symptoms of bleeding or with any medication changes. Patient given instructions utilizing the teach back method. After visit summary printed and reviewed with patient. Discharged ambulatory in no apparent distress, wearing mask.     For Pharmacy Admin Tracking Only     Intervention Detail: Dose Adjustment: 1, reason: Therapy Optimization   Total # of Interventions Recommended: 1   Total # of Interventions Accepted: 1   Time Spent (min): 15

## 2022-05-12 ENCOUNTER — OFFICE VISIT (OUTPATIENT)
Dept: CARDIOLOGY CLINIC | Age: 57
End: 2022-05-12
Payer: COMMERCIAL

## 2022-05-12 VITALS
SYSTOLIC BLOOD PRESSURE: 147 MMHG | HEIGHT: 64 IN | DIASTOLIC BLOOD PRESSURE: 99 MMHG | WEIGHT: 220.8 LBS | HEART RATE: 85 BPM | BODY MASS INDEX: 37.69 KG/M2

## 2022-05-12 DIAGNOSIS — R93.1 ABNORMAL ECHOCARDIOGRAM: ICD-10-CM

## 2022-05-12 DIAGNOSIS — R94.31 ABNORMAL EKG: ICD-10-CM

## 2022-05-12 DIAGNOSIS — Z01.818 PRE-OP EVALUATION: Primary | ICD-10-CM

## 2022-05-12 DIAGNOSIS — E78.5 DYSLIPIDEMIA: ICD-10-CM

## 2022-05-12 DIAGNOSIS — R00.1 SINUS BRADYCARDIA: ICD-10-CM

## 2022-05-12 PROCEDURE — 93000 ELECTROCARDIOGRAM COMPLETE: CPT | Performed by: INTERNAL MEDICINE

## 2022-05-12 PROCEDURE — 99214 OFFICE O/P EST MOD 30 MIN: CPT | Performed by: INTERNAL MEDICINE

## 2022-05-12 NOTE — PROGRESS NOTES
Chief Complaint   Patient presents with    Cardiac Clearance   . Originally Was bath room felt CP and later she found herself on the floor and brought to the hospital and found to have PE and troponin elevation  Was seen by Dr. Arjun Arnold in the hospital      1 year f/u    Pt here for Cardiac Clearance. She is having a Colonoscopy tomorrow 5/13/22. EKG done today    Used to be on cpap for TERESA used once in a while  Now off cpap and now on oral appliance and confirmed to work for her during  Repeat sleep study with appliance    Denies chest pain, sob, dizziness, edema, and palpitations    Had Hx of syncope sept 2013 while getting off toilate and Dx with PE    Dec 2009 had PE and sept 2013- PE      Patient Active Problem List   Diagnosis    Hx of Recurrent  pulmonary embolism dec 2009 and 09/2013- need to cont life long coumadin    hx of Syncope and collapse due to, PE    Obesity    Abnormal echocardiogram EF 55%, RVE and RV function reduced post PE    SOB (shortness of breath) on exertion- improving    Pulmonary embolus (HCC)    Deep vein thrombosis (HCC)    Proctor filter in place    Anticoagulated on Coumadin    Sinus bradycardia -  and pauses only in the night range from 2.1 to 3.7 sec pause-pat Dxed with TERESA 2013 and cpap- Holter monitor after CPAP got better    TERESA (obstructive sleep apnea)    Plantar fasciitis, right    Asthma    Type 2 diabetes mellitus (HCC)    Abnormal EKG    Diverticulitis of large intestine    Dyslipidemia    Degeneration of cervical intervertebral disc    Encounter for therapeutic drug monitoring    Pre-op evaluation for colonoscopy       Past Surgical History:   Procedure Laterality Date    ANKLE SURGERY      Left Ankle.  plate    BACK SURGERY  03/2022    DILATION AND CURETTAGE OF UTERUS N/A 8/15/2019    HYSTEROSCOPY DILATATION AND CURETTAGE performed by Liban Walker DO at [address] [place] [address]      right wrist for tendonitis    TOE SURGERY  06/25/2019    LEFT SECOND TOE    VENA CAVA FILTER PLACEMENT         No Known Allergies     Family History   Problem Relation Age of Onset    Cancer Mother         breast    High Blood Pressure Sister     High Cholesterol Sister     Diabetes Brother     Stroke Maternal Grandmother         Social History     Socioeconomic History    Marital status: Single     Spouse name: Not on file    Number of children: Not on file    Years of education: Not on file    Highest education level: Not on file   Occupational History    Not on file   Tobacco Use    Smoking status: Never Smoker    Smokeless tobacco: Never Used   Vaping Use    Vaping Use: Never used   Substance and Sexual Activity    Alcohol use: Yes     Comment: Ocassionally    Drug use: No    Sexual activity: Not Currently     Partners: Male   Other Topics Concern    Not on file   Social History Narrative    Not on file     Social Determinants of Health     Financial Resource Strain:     Difficulty of Paying Living Expenses: Not on file   Food Insecurity:     Worried About Running Out of Food in the Last Year: Not on file    Chele of Food in the Last Year: Not on file   Transportation Needs:     Lack of Transportation (Medical): Not on file    Lack of Transportation (Non-Medical):  Not on file   Physical Activity:     Days of Exercise per Week: Not on file    Minutes of Exercise per Session: Not on file   Stress:     Feeling of Stress : Not on file   Social Connections:     Frequency of Communication with Friends and Family: Not on file    Frequency of Social Gatherings with Friends and Family: Not on file    Attends Yazidism Services: Not on file    Active Member of Clubs or Organizations: Not on file    Attends Club or Organization Meetings: Not on file    Marital Status: Not on file   Intimate Partner Violence:     Fear of Current or Ex-Partner: Not on file    Emotionally Abused: Not on file    Physically Abused: Not on file    Sexually Abused: Not on file   Housing Stability:     Unable to Pay for Housing in the Last Year: Not on file    Number of Places Lived in the Last Year: Not on file    Unstable Housing in the Last Year: Not on file       Current Outpatient Medications   Medication Sig Dispense Refill    hydrOXYzine (ATARAX) 50 MG tablet TAKE 1/2 TO 1 TABLET BY MOUTH THREE TIMES A DAY AS NEEDED      enoxaparin (LOVENOX) 100 MG/ML Inject 1 mL into the skin every 12 hours 6 each 0    warfarin (COUMADIN) 5 MG tablet TAKE AS DIRECTED BY COUMADIN CLINIC 200 tabs = 90 days 200 tablet 3    cyclobenzaprine (FLEXERIL) 10 MG tablet Take 10 mg by mouth 3 times daily as needed for Muscle spasms       lidocaine (LIDODERM) 5 % Place 1 patch onto the skin daily 12 hours on, 12 hours off. 30 patch 0    oxyCODONE-acetaminophen (PERCOCET) 5-325 MG per tablet Take 1 tablet by mouth every 6 hours as needed for Pain. Indications: Pain       Caltrate 600+D Plus Minerals (CALTRATE) 600-800 MG-UNIT TABS tablet Take 2 tablets by mouth daily       triamcinolone (NASACORT) 55 MCG/ACT nasal inhaler 1 spray by Nasal route daily In each nostril      montelukast (SINGULAIR) 10 MG tablet Take 10 mg by mouth daily      alendronate (FOSAMAX) 70 MG tablet Take 70 mg by mouth every 28 days       metFORMIN (GLUCOPHAGE) 500 MG tablet Take 500 mg by mouth 2 times daily (with meals)   0    lisinopril (PRINIVIL;ZESTRIL) 5 MG tablet Take 5 mg by mouth daily Indications: Diabetes, Raised Blood Pressure      atorvastatin (LIPITOR) 20 MG tablet Take 20 mg by mouth daily Indications: Blood Cholesterol Abnormal      Albuterol Sulfate (VENTOLIN HFA IN) Inhale 2 puffs into the lungs every 4 hours as needed Indications: Asthma       Acetaminophen (TYLENOL PO) Take 1,000 mg by mouth as needed Indications: Pain Don't take more than 3,000 mg per day, including what's in Percocet. No current facility-administered medications for this visit.        Review of Systems -     General ROS: negative  Psychological ROS: negative  Hematological and Lymphatic ROS: No history of blood clots or bleeding disorder. Respiratory ROS: no cough, shortness of breath, or wheezing  Cardiovascular ROS: no chest pain or dyspnea on exertion  Gastrointestinal ROS: negative  Genito-Urinary ROS: no dysuria, trouble voiding, or hematuria  Musculoskeletal ROS: negative  Neurological ROS: no TIA or stroke symptoms  Dermatological ROS: negative      Blood pressure (!) 147/99, pulse 85, height 5' 4\" (1.626 m), weight 220 lb 12.8 oz (100.2 kg). Physical Examination:    General appearance - alert, well appearing, and in no distress  Mental status - alert, oriented to person, place, and time  Neck - supple, no significant adenopathy, no JVD, or carotid bruits  Chest - clear to auscultation, no wheezes, rales or rhonchi, symmetric air entry  Heart - normal rate, regular rhythm, normal S1, S2, no murmurs, rubs, clicks or gallops  Abdomen - soft, nontender, nondistended, no masses or organomegaly  Neurological - alert, oriented, normal speech, no focal findings or movement disorder noted  Musculoskeletal - no joint tenderness, deformity or swelling  Extremities - peripheral pulses normal, no pedal edema, no clubbing or cyanosis  Skin - normal coloration and turgor, no rashes, no suspicious skin lesions noted    Lab  No results for input(s): CKTOTAL, CKMB, CKMBINDEX, TROPONINI in the last 72 hours.   CBC:   Lab Results   Component Value Date    WBC 6.9 03/23/2022    RBC 3.53 03/23/2022    HGB 9.9 03/23/2022    HCT 31.3 03/23/2022    MCV 88.7 03/23/2022    MCH 28.0 03/23/2022    MCHC 31.6 03/23/2022    RDW 16.1 05/28/2021     03/23/2022    MPV 10.0 03/23/2022     BMP:    Lab Results   Component Value Date     03/23/2022    K 4.7 03/23/2022    K 4.0 11/12/2020     03/23/2022    CO2 23 03/23/2022    BUN 20 03/23/2022    LABALBU 3.5 03/23/2022    CREATININE 0.9 03/23/2022 CALCIUM 9.5 03/23/2022    GFRAA >60 07/08/2020    LABGLOM 78 03/23/2022    GLUCOSE 107 03/23/2022    GLUCOSE 114 05/28/2021     Hepatic Function Panel:    Lab Results   Component Value Date    ALKPHOS 83 03/23/2022    ALT 20 03/23/2022    AST 26 03/23/2022    PROT 6.9 03/23/2022    PROT 7.6 12/29/2016    BILITOT 0.3 03/23/2022    BILIDIR <0.2 01/22/2019    IBILI 0.6 12/29/2016    LABALBU 3.5 03/23/2022     Magnesium:    No results found for: MG  Warfarin PT/INR:    No components found for: PTPATWAR,  PTINRWAR  HgBA1c:    Lab Results   Component Value Date    LABA1C 6.1 09/10/2013     FLP:    Lab Results   Component Value Date    TRIG 93 05/28/2021    HDL 50 05/28/2021    LDLCALC 174 12/29/2016    LDLDIRECT 63 05/28/2021     TSH:    Lab Results   Component Value Date    TSH 1.65 07/08/2020     Echo  SUMMARY:    Left ventricle: There is moderate hypokinesis of the entire septum. Size was normal.  Systolic function was at the lower limits of normal by Teichholz. Ejection  fraction was estimated in the range of 50 % to 55 %. This study was inadequate for the evaluation of regional wall motion. Wall thickness was at the upper limits of normal.  Doppler parameters were consistent with abnormal left ventricular relaxation  (grade 1 diastolic dysfunction). Right ventricle: The ventricle was mildly to moderately dilated. Systolic function was moderately to markedly reduced. Right atrium:  The atrium was mildly dilated. Tricuspid valve: There was mild regurgitation. Inferior vena cava, hepatic veins: The respirophasic change in diameter was less than 50%.     Prepared and signed by    Gayathri Leary MD    EKG   Sinus bradycardia  Otherwise normal ECG  When compared with ECG of 10-SEP-2013 07:00,  Ventricular rate has decreased BY 37 BPM  ST no longer elevated in Anterior leads  T wave inversion less evident in Anterior leads  QT has shortened  Confirmed by Yosvany Bauer MD, Arabella Buck ([id number])    Nuc stress Okay    EKG 14-Sinus  Rhythm   -RSR(V1) -nondiagnostic. PROBABLY NORMAL    14-ekg-Sinus  Bradycardia   -RSR(V1) -nondiagnostic. PROBABLY NORMAL      CONCLUSION:  1. This is a normal sinus rhythm with average heart rate of 76 beats  per minute ranging from  beats per minute. 2. No significant pause of more than 1.4 seconds noted. 3. Rare ventricular ectopic beats, all isolated. Rare  supraventricular ectopic beats, isolated and couplets. 4. No other form of arrhythmia noted. No significant bradyarrhythmia  noted as well.          Aimee Roque M.D.  Martin Jim  D: 2015 17:44     EKG 10/13/16  Sinus  Rhythm   -Incomplete right bundle branch block. ABNORMAL     10/16/18  Sinus  Rhythm  -With rate variation   cv = 11.  -Incomplete right bundle branch block. ABNORMAL    EKG 19   Sinus  Rhythm   -Incomplete right bundle branch block. ABNORMAL     ekg 2020  Sinus  Rhythm   -Incomplete right bundle branch block. ABNORMAL     ekg 22  Sinus  Rhythm   IRBBB. -Old anterior infarct.    -  Nonspecific T-abnormality. ABNORMAL       Assessment     Diagnosis Orders   1. Pre-op evaluation for colonoscopy     2. Dyslipidemia     3. Sinus bradycardia -  and pauses only in the night range from 2.1 to 3.7 sec pause-pat Dxed with TERESA  and cpap- Holter monitor after CPAP got better     4. Abnormal echocardiogram EF 55%, RVE and RV function reduced post PE     5. Abnormal EKG         Past admission Dx  IMPRESSION:   1. Bilateral pulmonary emboli - recurrent. 2. Incomplete right bundle branch block and EKG abnormalities   secondary to her pulmonary emboli.    3. Mildly elevated troponin secondary to her pulmonary emboli    NO FHX of premature CAD    Father  at 45 yrs in his sleep-stated ETOH related and liver problem from [id number] [address]     The most Current meds and labs reviewed    Hx of Abn holter with 2.1 tioo 3.7 sec pause- resolved and HR got better after stopping lopressor and using CPAP-its seems to be TERESA related  Prachi Cisneros already Dxed with TERESA in 2013  Was on CPAP and then the holter 48  Post capa was good with no pause  Pause resolved after CPAP  D/w the pat at length to keep cpap    Hx of Sinus Bradycardia   with no symptom and abn holter  Off   atenolol 12.5 po qd and oncpap  Got better  NO definite HX of HTN    HX of DVT and PE - need life Long coumadin    hypercoagulable study- negative    Continue the current treatment and with constant vigilance to changes in symptoms and also any potential side effects. Return for care or seek medical attention immediately if symptoms got worse and/or develop new symptoms. Asa 81mg po qd    Repeat echo in for RV function-  And pre op- next visit- RV size regressessed  D/w the pat the plan of care and the risk involving surgey  Off pravastatin as lipids are okay and trop related to PE    On lisinopril for DM  On statin low dose for hLP with DM       Hyperlipidemia: on statins, followed periodically. Patient need periodic lipid and liver profile. D/w the pat plan of care  Encouraged to cont Cpap    Over all Stable and doing well    Discussed use, benefit, and side effects of prescribed medications. All patient questions answered. Pt voiced understanding. Instructed to continue current medications, diet and exercise. Continue risk factor modification and medical management. Patient agreed with treatment plan. Follow up as directed.       RTC in   1 year    [facility]oisGreat [facility]

## 2022-05-19 ENCOUNTER — HOSPITAL ENCOUNTER (OUTPATIENT)
Dept: PHARMACY | Age: 57
Setting detail: THERAPIES SERIES
Discharge: HOME | End: 2022-05-19
Payer: COMMERCIAL

## 2022-05-19 DIAGNOSIS — Z51.81 ENCOUNTER FOR THERAPEUTIC DRUG MONITORING: ICD-10-CM

## 2022-05-19 DIAGNOSIS — Z79.01 ANTICOAGULATED ON COUMADIN: ICD-10-CM

## 2022-05-19 DIAGNOSIS — I26.99 PULMONARY EMBOLISM, OTHER, UNSPECIFIED CHRONICITY, UNSPECIFIED WHETHER ACUTE COR PULMONALE PRESENT (HCC): Primary | ICD-10-CM

## 2022-05-19 LAB — POC INR: 1.4 (ref 0.8–1.2)

## 2022-05-19 PROCEDURE — 85610 PROTHROMBIN TIME: CPT | Performed by: PHARMACIST

## 2022-05-19 PROCEDURE — 36416 COLLJ CAPILLARY BLOOD SPEC: CPT | Performed by: PHARMACIST

## 2022-05-19 PROCEDURE — 99212 OFFICE O/P EST SF 10 MIN: CPT | Performed by: PHARMACIST

## 2022-05-19 NOTE — PROGRESS NOTES
Medication Management [address]  [phone] (phone)  [phone] (fax)    Ms. Chiquis Solomon is a 62 y.o.  female with history of DVT, PE who presents today for anticoagulation monitoring and adjustment. Patient verifies current dosing regimen and tablet strength. No missed or extra doses. Patient denies s/s bleeding/bruising/swelling/SOB/chest pain  No blood in urine or stool. Back to baseline diet as it was prior to back surgery. No changes in medication/OTC agents/Herbals. No change in alcohol use or tobacco use. Slowly getting back to typical activity level. Patient denies headaches/dizziness/lightheadedness/falls. No vomiting/diarrhea or acute illness. No Procedures scheduled in the future at this time. Currently bridging with Lovenox s/p colonoscopy, last dose was yesterday pm.     Assessment:   Lab Results   Component Value Date    INR 1.40 (H) 05/19/2022    INR 1.70 (H) 05/05/2022    INR 3.30 (H) 04/21/2022     INR subtherapeutic   Recent Labs     05/19/22  0920   INR 1.40*     Discussed subtherapeutic INR with pt and need to continue Lovenox bridge. Pt does not want to do more Lovenox shots, last VTE was 2013. Pt does have 3 doses left at home, pt is agreeable to use those 3 doses. Plan:  Continue Lovenox 100mg SQ Q12h x 3 more doses. Coumadin 15mg x2, then continue Coumadin 10mg daily. Recheck INR in 1 week(s). Patient reminded to call the Anticoagulation Clinic with any signs or symptoms of bleeding or with any medication changes. Patient given instructions utilizing the teach back method. After visit summary printed and reviewed with patient. Discharged ambulatory in no apparent distress.       For Pharmacy Admin Tracking Only     Intervention Detail: Dose Adjustment: 1, reason: Therapy Optimization   Total # of Interventions Recommended: 1   Total # of Interventions Accepted: 1   Time Spent (min): 20

## 2022-05-26 ENCOUNTER — HOSPITAL ENCOUNTER (OUTPATIENT)
Dept: PHARMACY | Age: 57
Setting detail: THERAPIES SERIES
Discharge: HOME | End: 2022-05-26
Payer: COMMERCIAL

## 2022-05-26 DIAGNOSIS — Z51.81 ENCOUNTER FOR THERAPEUTIC DRUG MONITORING: ICD-10-CM

## 2022-05-26 DIAGNOSIS — I26.99 PULMONARY EMBOLISM, OTHER, UNSPECIFIED CHRONICITY, UNSPECIFIED WHETHER ACUTE COR PULMONALE PRESENT (HCC): Primary | ICD-10-CM

## 2022-05-26 DIAGNOSIS — Z79.01 ANTICOAGULATED ON COUMADIN: ICD-10-CM

## 2022-05-26 LAB — POC INR: 1.7 (ref 0.8–1.2)

## 2022-05-26 PROCEDURE — 36416 COLLJ CAPILLARY BLOOD SPEC: CPT | Performed by: PHARMACIST

## 2022-05-26 PROCEDURE — 99212 OFFICE O/P EST SF 10 MIN: CPT | Performed by: PHARMACIST

## 2022-05-26 PROCEDURE — 85610 PROTHROMBIN TIME: CPT | Performed by: PHARMACIST

## 2022-05-26 NOTE — PROGRESS NOTES
Medication Management [address]  [phone] (phone)  [phone] (fax)    Ms. Natali Dillard is a 62 y.o.  female with history of DVT, PE who presents today for anticoagulation monitoring and adjustment. Patient verifies current dosing regimen and tablet strength. No missed or extra doses. Patient denies s/s bleeding/bruising/swelling/SOB/chest pain  No blood in urine or stool. No dietary changes. No changes in medication/OTC agents/Herbals. No change in alcohol use or tobacco use. Has been increasing activity level. Physical therapy starts June 16th, goes back to work July 18th. Patient denies headaches/dizziness/lightheadedness/falls. No vomiting/diarrhea or acute illness. No Procedures scheduled in the future at this time. Assessment:   Lab Results   Component Value Date    INR 1.70 (H) 05/26/2022    INR 1.40 (H) 05/19/2022    INR 1.70 (H) 05/05/2022     INR subtherapeutic   Recent Labs     05/26/22  0906   INR 1.70*         Plan:  Coumadin 15mg today, then increase Coumadin 15mg MF and 10mg TWThSaS. Recheck INR in 2 week(s). Patient reminded to call the Anticoagulation Clinic with signs or symptoms of bleeding or with any medication changes. Patient given instructions utilizing the teach back method. After visit summary printed and reviewed with patient. Discharged ambulatory in no apparent distress.     For Pharmacy Admin Tracking Only     Intervention Detail: Dose Adjustment: 1, reason: Therapy Optimization   Total # of Interventions Recommended: 1   Total # of Interventions Accepted: 1   Time Spent (min): 20

## 2022-06-09 ENCOUNTER — HOSPITAL ENCOUNTER (OUTPATIENT)
Dept: PHARMACY | Age: 57
Setting detail: THERAPIES SERIES
Discharge: HOME | End: 2022-06-09
Payer: COMMERCIAL

## 2022-06-09 DIAGNOSIS — Z79.01 ANTICOAGULATED ON COUMADIN: ICD-10-CM

## 2022-06-09 DIAGNOSIS — Z51.81 ENCOUNTER FOR THERAPEUTIC DRUG MONITORING: ICD-10-CM

## 2022-06-09 DIAGNOSIS — I26.99 PULMONARY EMBOLISM, OTHER, UNSPECIFIED CHRONICITY, UNSPECIFIED WHETHER ACUTE COR PULMONALE PRESENT (HCC): Primary | ICD-10-CM

## 2022-06-09 LAB — POC INR: 2.4 (ref 0.8–1.2)

## 2022-06-09 PROCEDURE — 36416 COLLJ CAPILLARY BLOOD SPEC: CPT | Performed by: PHARMACIST

## 2022-06-09 PROCEDURE — 99211 OFF/OP EST MAY X REQ PHY/QHP: CPT | Performed by: PHARMACIST

## 2022-06-09 PROCEDURE — 85610 PROTHROMBIN TIME: CPT | Performed by: PHARMACIST

## 2022-06-09 NOTE — PROGRESS NOTES
Medication Management [address]  [phone] (phone)  [phone] (fax)    Ms. Chiquis Solomon is a 62 y.o.  female with history of DVT, PE who presents today for anticoagulation monitoring and adjustment. Patient verifies current tablet strength. Took incorrect regimen, took 15mg MWF and 10mg TThSaS. Had been instructed to take 15mg MF and 10mg all other days. No missed or extra doses. Patient denies s/s bleeding/bruising/swelling/SOB/chest pain  No blood in urine or stool. Less Vitamin K lately. No changes in medication/OTC agents/Herbals. No change in alcohol use or tobacco use. Slightly more active, will be starting physical therapy on 6/16. Patient denies headaches/dizziness/lightheadedness/falls. No vomiting/diarrhea or acute illness. No Procedures scheduled in the future at this time. Assessment:   Lab Results   Component Value Date    INR 2.40 (H) 06/09/2022    INR 1.70 (H) 05/26/2022    INR 1.40 (H) 05/19/2022     INR therapeutic   Recent Labs     06/09/22  0901   INR 2.40*         Plan:  Continue Coumadin 15mg MWF and 10mg TThSaS as pt has been taking. Recheck INR in 2.5 week(s). Patient reminded to call the Anticoagulation Clinic with any signs or symptoms of bleeding or with any medication changes. Patient given instructions utilizing the teach back method. After visit summary printed and reviewed with patient. Discharged ambulatory in no apparent distress.     For Pharmacy Admin Tracking Only     Intervention Detail: Dose Adjustment: 1, reason: Therapy Optimization   Total # of Interventions Recommended: 1   Total # of Interventions Accepted: 1   Time Spent (min): 20

## 2022-06-11 PROBLEM — Z01.818 PRE-OP EVALUATION: Status: RESOLVED | Noted: 2022-05-12 | Resolved: 2022-06-11

## 2022-06-27 ENCOUNTER — HOSPITAL ENCOUNTER (OUTPATIENT)
Dept: PHARMACY | Age: 57
Setting detail: THERAPIES SERIES
Discharge: HOME | End: 2022-06-27
Payer: COMMERCIAL

## 2022-06-27 DIAGNOSIS — Z51.81 ENCOUNTER FOR THERAPEUTIC DRUG MONITORING: ICD-10-CM

## 2022-06-27 DIAGNOSIS — Z79.01 ANTICOAGULATED ON COUMADIN: Primary | ICD-10-CM

## 2022-06-27 LAB — POC INR: 2.2 (ref 0.8–1.2)

## 2022-06-27 PROCEDURE — 36416 COLLJ CAPILLARY BLOOD SPEC: CPT | Performed by: PHARMACIST

## 2022-06-27 PROCEDURE — 99211 OFF/OP EST MAY X REQ PHY/QHP: CPT | Performed by: PHARMACIST

## 2022-06-27 PROCEDURE — 85610 PROTHROMBIN TIME: CPT | Performed by: PHARMACIST

## 2022-06-27 NOTE — PROGRESS NOTES
Medication Management [address]  [phone] (phone)  [phone] (fax)    Ms. Chiquis Solomon is a 62 y.o.  female with history of DVT, PE who presents today for anticoagulation monitoring and adjustment. Patient verifies current dosing regimen and tablet strength. No missed or extra doses. Patient denies s/s bleeding/bruising/swelling/SOB/chest pain  No blood in urine or stool. No dietary changes. No changes in medication/OTC agents/Herbals. No change in alcohol use or tobacco use. No change in activity level. Patient denies headaches/dizziness/lightheadedness/falls. No vomiting/diarrhea or acute illness. No Procedures scheduled in the future at this time. Patient interview completed and discussed with pharmacist by CÉSAR PalacioD Candidate. Assessment:   Lab Results   Component Value Date    INR 2.20 (H) 06/27/2022    INR 2.40 (H) 06/09/2022    INR 1.70 (H) 05/26/2022     INR therapeutic   Recent Labs     06/27/22  0853   INR 2.20*   2nd consecutive therapeutic INR following dose adjustment 5/26    Plan:  Continue Coumadin 15mg MWF 10mg TuThSS. Recheck INR in 4 week(s). Patient reminded to call the Anticoagulation Clinic with any signs or symptoms of bleeding or with any medication changes. Patient given instructions utilizing the teach back method. After visit summary printed and reviewed with patient. Discharged ambulatory in no apparent distress.     For Pharmacy Admin Tracking Only     Intervention Detail:    Total # of Interventions Recommended: 0   Total # of Interventions Accepted: 0   Time Spent (min): 20       Brayton Leventhal, PharmD 6/27/2022 9:04 AM

## 2022-07-25 ENCOUNTER — HOSPITAL ENCOUNTER (OUTPATIENT)
Dept: PHARMACY | Age: 57
Setting detail: THERAPIES SERIES
Discharge: HOME | End: 2022-07-25
Payer: COMMERCIAL

## 2022-07-25 DIAGNOSIS — Z51.81 ENCOUNTER FOR THERAPEUTIC DRUG MONITORING: ICD-10-CM

## 2022-07-25 DIAGNOSIS — I26.99 PULMONARY EMBOLISM, UNSPECIFIED CHRONICITY, UNSPECIFIED PULMONARY EMBOLISM TYPE, UNSPECIFIED WHETHER ACUTE COR PULMONALE PRESENT (HCC): Primary | ICD-10-CM

## 2022-07-25 DIAGNOSIS — I82.409 DEEP VEIN THROMBOSIS (DVT) OF LOWER EXTREMITY, UNSPECIFIED CHRONICITY, UNSPECIFIED LATERALITY, UNSPECIFIED VEIN (HCC): ICD-10-CM

## 2022-07-25 DIAGNOSIS — Z79.01 ANTICOAGULATED ON COUMADIN: ICD-10-CM

## 2022-07-25 LAB — POC INR: 2.7 (ref 0.8–1.2)

## 2022-07-25 PROCEDURE — 85610 PROTHROMBIN TIME: CPT | Performed by: PHARMACIST

## 2022-07-25 PROCEDURE — 36416 COLLJ CAPILLARY BLOOD SPEC: CPT | Performed by: PHARMACIST

## 2022-07-25 PROCEDURE — 99211 OFF/OP EST MAY X REQ PHY/QHP: CPT | Performed by: PHARMACIST

## 2022-07-25 NOTE — PROGRESS NOTES
Medication Management [address]  [phone] (phone)  [phone] (fax)    Ms. Ruthie Ariza is a 62 y.o.  female with history of DVT, PE who presents today for anticoagulation monitoring and adjustment. Patient verifies current dosing regimen and tablet strength. No missed or extra doses. Patient denies s/s bleeding/bruising/swelling/SOB/chest pain  No blood in urine or stool. No dietary changes. No changes in medication/OTC agents/Herbals. No change in alcohol use or tobacco use. Occasionally drinks 1-2 glasses of alcohol with a meal/company  Increased activity starting back at work  Patient denies headaches/dizziness/lightheadedness/falls. No vomiting/diarrhea or acute illness. No Procedures scheduled in the future at this time. Patient interview completed and discussed with pharmacist by Rivear Ashley  Assessment:   Lab Results   Component Value Date    INR 2.70 (H) 07/25/2022    INR 2.20 (H) 06/27/2022    INR 2.40 (H) 06/09/2022     INR therapeutic   Recent Labs     07/25/22  1553   INR 2.70*       Plan:  Continue Coumadin 15mg MWF 10mg TuThSS. Recheck INR in 4 week(s). Patient reminded to call the Anticoagulation Clinic with any signs or symptoms of bleeding or with any medication changes. Patient given instructions utilizing the teach back method. After visit summary printed and reviewed with patient. Discharged ambulatory in no apparent distress.      For Pharmacy Admin Tracking Only    Intervention Detail:   Total # of Interventions Recommended: 0  Total # of Interventions Accepted: 0  Time Spent (min): 20

## 2022-08-23 ENCOUNTER — HOSPITAL ENCOUNTER (OUTPATIENT)
Dept: PHARMACY | Age: 57
Setting detail: THERAPIES SERIES
Discharge: HOME | End: 2022-08-23
Payer: COMMERCIAL

## 2022-08-23 DIAGNOSIS — I82.409 DEEP VEIN THROMBOSIS (DVT) OF LOWER EXTREMITY, UNSPECIFIED CHRONICITY, UNSPECIFIED LATERALITY, UNSPECIFIED VEIN (HCC): Primary | ICD-10-CM

## 2022-08-23 DIAGNOSIS — Z51.81 ENCOUNTER FOR THERAPEUTIC DRUG MONITORING: ICD-10-CM

## 2022-08-23 DIAGNOSIS — Z79.01 ANTICOAGULATED ON COUMADIN: ICD-10-CM

## 2022-08-23 DIAGNOSIS — I26.99 PULMONARY EMBOLISM, UNSPECIFIED CHRONICITY, UNSPECIFIED PULMONARY EMBOLISM TYPE, UNSPECIFIED WHETHER ACUTE COR PULMONALE PRESENT (HCC): ICD-10-CM

## 2022-08-23 LAB — POC INR: 1.8 (ref 0.8–1.2)

## 2022-08-23 PROCEDURE — 85610 PROTHROMBIN TIME: CPT

## 2022-08-23 PROCEDURE — 99212 OFFICE O/P EST SF 10 MIN: CPT

## 2022-08-23 PROCEDURE — 36416 COLLJ CAPILLARY BLOOD SPEC: CPT

## 2022-08-23 NOTE — PROGRESS NOTES
Medication Management [address]  [phone] (phone)  [phone] (fax)    Ms. Harjit Lopes is a 62 y.o.  female with history of DVT, PE who presents today for anticoagulation monitoring and adjustment. Patient verifies current dosing regimen and tablet strength. No missed or extra doses. Patient denies s/s bleeding/bruising/swelling/SOB/chest pain  No blood in urine or stool. Patient states she had some cabbage and greens on Saturday. No changes in medication/OTC agents/Herbals. No change in alcohol use or tobacco use. Patient has been more active with work. Patient denies headaches/dizziness/lightheadedness/falls. No vomiting/diarrhea or acute illness. No Procedures scheduled in the future at this time. Assessment:   Lab Results   Component Value Date    INR 1.80 (H) 08/23/2022    INR 2.70 (H) 07/25/2022    INR 2.20 (H) 06/27/2022     INR subtherapeutic   Recent Labs     08/23/22  1611   INR 1.80*     Patient is slightly subtherapeutic today, which may be due to increased Vitamin K intake and activity. Previously, patient had three consecutive therapeutic INRs. Plan:  Coumadin 15 mg x 1 dose today 8/23/22, then continue Coumadin 15 mg MWF and 10 mg TuThSaSu. Recheck INR in 3 week(s). Patient reminded to call the Anticoagulation Clinic with any signs or symptoms of bleeding or with any medication changes. Patient given instructions utilizing the teach back method. After visit summary printed and reviewed with patient. Discharged ambulatory in no apparent distress.     For Pharmacy Admin Tracking Only    Intervention Detail: Dose Adjustment: 1, reason: Therapy Optimization  Total # of Interventions Recommended: 1  Total # of Interventions Accepted: 1  Time Spent (min): 9906  [facility], [place], BCPS  8/23/2022  4:23 PM

## 2022-09-13 ENCOUNTER — HOSPITAL ENCOUNTER (OUTPATIENT)
Dept: PHARMACY | Age: 57
Setting detail: THERAPIES SERIES
Discharge: HOME | End: 2022-09-13
Payer: COMMERCIAL

## 2022-09-13 DIAGNOSIS — Z79.01 ANTICOAGULATED ON COUMADIN: ICD-10-CM

## 2022-09-13 DIAGNOSIS — Z51.81 ENCOUNTER FOR THERAPEUTIC DRUG MONITORING: ICD-10-CM

## 2022-09-13 DIAGNOSIS — I82.409 DEEP VEIN THROMBOSIS (DVT) OF LOWER EXTREMITY, UNSPECIFIED CHRONICITY, UNSPECIFIED LATERALITY, UNSPECIFIED VEIN (HCC): ICD-10-CM

## 2022-09-13 DIAGNOSIS — I26.99 PULMONARY EMBOLISM, UNSPECIFIED CHRONICITY, UNSPECIFIED PULMONARY EMBOLISM TYPE, UNSPECIFIED WHETHER ACUTE COR PULMONALE PRESENT (HCC): Primary | ICD-10-CM

## 2022-09-13 LAB — POC INR: 2 (ref 0.8–1.2)

## 2022-09-13 PROCEDURE — 85610 PROTHROMBIN TIME: CPT

## 2022-09-13 PROCEDURE — 99211 OFF/OP EST MAY X REQ PHY/QHP: CPT

## 2022-09-13 PROCEDURE — 36416 COLLJ CAPILLARY BLOOD SPEC: CPT

## 2022-09-13 NOTE — PROGRESS NOTES
Medication Management [address]  [phone] (phone)  [phone] (fax)    Ms. Eros Nickerson is a 62 y.o.  female with history of PE/DVT, per referral from ANTONIA Guzman, who presents today for Warfarin monitoring and adjustment (3 week visit - late for today's visit). Patient verifies current dosing regimen and tablet strength. No missed or extra doses, except for bolus ordered last visit. Patient denies bleeding/swelling/SOB/chest pain. Noticed a bruise she thinks came from work. No blood in urine or stool. No dietary changes (?). States these shorter interval visits tend to throw her on consistency with greens or cabbage - would normally have 4 times in 6 week interval (only had once in past 3 weeks). Changes in medication/OTC agents/herbals: resumed Magnesium supplement from [facility] (stated was [facility] with clinic pharmacist in past) - for leg cramps. No change in tobacco use. Had more alcohol than usual.  Change in activity level: increased (working 9 hour days past 2 weeks, instead of 8). Had more stress. Patient denies headaches/dizziness/lightheadedness/falls. No vomiting/diarrhea or acute illness. No procedures scheduled in the future at this time. Sees surgeon next week for follow up on back surgery. Assessment:   Lab Results   Component Value Date    INR 2.00 (H) 09/13/2022    INR 1.80 (H) 08/23/2022    INR 2.70 (H) 07/25/2022     INR therapeutic - goal 2-3. Recent Labs     09/13/22  0904   INR 2.00*        Plan:  POCT INR ordered/performed/result reviewed. Continue PO Coumadin 15 mg MWF, 10 mg TThSS. Recheck INR in 3 week(s). (Report given - orders entered by Tuan Shelby RPh., PharmD.)   Patient reminded to call the Anticoagulation Clinic with any signs or symptoms of bleeding or with any medication changes. Patient given instructions utilizing the teach back method. After visit summary printed and reviewed with patient. Discharged ambulatory in no apparent distress, wearing mask.     For Pharmacy Admin Tracking Only    Time Spent (min):  0.5

## 2022-10-04 ENCOUNTER — HOSPITAL ENCOUNTER (OUTPATIENT)
Dept: PHARMACY | Age: 57
Setting detail: THERAPIES SERIES
Discharge: HOME | End: 2022-10-04
Payer: COMMERCIAL

## 2022-10-04 DIAGNOSIS — I26.99 PULMONARY EMBOLISM WITHOUT ACUTE COR PULMONALE, UNSPECIFIED CHRONICITY, UNSPECIFIED PULMONARY EMBOLISM TYPE (HCC): Primary | ICD-10-CM

## 2022-10-04 DIAGNOSIS — Z51.81 ENCOUNTER FOR THERAPEUTIC DRUG MONITORING: ICD-10-CM

## 2022-10-04 DIAGNOSIS — Z79.01 ANTICOAGULATED ON COUMADIN: ICD-10-CM

## 2022-10-04 DIAGNOSIS — I82.409 DEEP VEIN THROMBOSIS (DVT) OF LOWER EXTREMITY, UNSPECIFIED CHRONICITY, UNSPECIFIED LATERALITY, UNSPECIFIED VEIN (HCC): ICD-10-CM

## 2022-10-04 LAB — POC INR: 2.2 (ref 0.8–1.2)

## 2022-10-04 PROCEDURE — 36416 COLLJ CAPILLARY BLOOD SPEC: CPT

## 2022-10-04 PROCEDURE — 85610 PROTHROMBIN TIME: CPT

## 2022-10-04 PROCEDURE — 99211 OFF/OP EST MAY X REQ PHY/QHP: CPT

## 2022-10-04 NOTE — PROGRESS NOTES
Medication Management [address]   [phone] (phone)  [phone] (fax)    Ms. Simone Byrd is a 62 y.o.  female with history of DVT, PE who presents today for anticoagulation monitoring and adjustment. Patient verifies current dosing regimen and tablet strength. No missed or extra doses. Patient denies s/s bleeding/bruising/swelling/SOB/chest pain  No blood in urine or stool. Dietary changes  - Diet back to normal with how she normally eats the green leafy vegetables. No changes in medication/OTC agents/Herbals. No change in alcohol use or tobacco use. No change in activity level. Patient denies headaches/dizziness/lightheadedness/falls. - Rolled ankle last week in the street, but did not fall  No vomiting/diarrhea or acute illness. No Procedures scheduled in the future at this time. Assessment:   Lab Results   Component Value Date    INR 2.20 (H) 10/04/2022    INR 2.00 (H) 09/13/2022    INR 1.80 (H) 08/23/2022     INR therapeutic   Recent Labs     10/04/22  1313   INR 2.20*         Plan:  Continue Coumadin 15 mg MWF, 10 mg all other days. Recheck INR in 4 week(s). Patient reminded to call the Anticoagulation Clinic with any signs or symptoms of bleeding or with any medication changes. Patient given instructions utilizing the teach back method. After visit summary printed and reviewed with patient. Discharged ambulatory in no apparent distress.     For Pharmacy Admin Tracking Only  Time Spent (min): [address], PharmD, BCPS  10/4/2022  1:19 PM

## 2022-11-01 ENCOUNTER — HOSPITAL ENCOUNTER (OUTPATIENT)
Age: 57
Setting detail: SPECIMEN
Discharge: HOME | End: 2022-11-01

## 2022-11-01 ENCOUNTER — APPOINTMENT (OUTPATIENT)
Dept: PHARMACY | Age: 57
End: 2022-11-01
Payer: COMMERCIAL

## 2022-11-01 LAB
ALBUMIN SERPL-MCNC: 4.2 G/DL (ref 3.5–5.2)
ALBUMIN/GLOBULIN RATIO: 1.4 (ref 1–2.5)
ALP BLD-CCNC: 111 U/L (ref 35–104)
ALT SERPL-CCNC: 20 U/L (ref 5–33)
ANION GAP SERPL CALCULATED.3IONS-SCNC: 15 MMOL/L (ref 9–17)
AST SERPL-CCNC: 20 U/L
BILIRUB SERPL-MCNC: 0.4 MG/DL (ref 0.3–1.2)
BUN BLDV-MCNC: 15 MG/DL (ref 6–20)
CALCIUM SERPL-MCNC: 9.3 MG/DL (ref 8.6–10.4)
CHLORIDE BLD-SCNC: 104 MMOL/L (ref 98–107)
CHOLESTEROL/HDL RATIO: 2.5
CHOLESTEROL: 133 MG/DL
CO2: 22 MMOL/L (ref 20–31)
CREAT SERPL-MCNC: 0.86 MG/DL (ref 0.5–0.9)
GFR SERPL CREATININE-BSD FRML MDRD: >60 ML/MIN/1.73M2
GLUCOSE BLD-MCNC: 86 MG/DL (ref 70–99)
HCT VFR BLD CALC: 37.9 % (ref 36.3–47.1)
HDLC SERPL-MCNC: 53 MG/DL
HEMOGLOBIN: 12.2 G/DL (ref 11.9–15.1)
LDL CHOLESTEROL: 51 MG/DL (ref 0–130)
MAGNESIUM: 1.8 MG/DL (ref 1.6–2.6)
MCH RBC QN AUTO: 28.6 PG (ref 25.2–33.5)
MCHC RBC AUTO-ENTMCNC: 32.2 G/DL (ref 28.4–34.8)
MCV RBC AUTO: 88.8 FL (ref 82.6–102.9)
NRBC AUTOMATED: 0 PER 100 WBC
PDW BLD-RTO: 15.4 % (ref 11.8–14.4)
PHOSPHORUS: 3.9 MG/DL (ref 2.6–4.5)
PLATELET # BLD: 303 K/UL (ref 138–453)
PMV BLD AUTO: 11.3 FL (ref 8.1–13.5)
POTASSIUM SERPL-SCNC: 4 MMOL/L (ref 3.7–5.3)
RBC # BLD: 4.27 M/UL (ref 3.95–5.11)
SODIUM BLD-SCNC: 141 MMOL/L (ref 135–144)
TOTAL PROTEIN: 7.2 G/DL (ref 6.4–8.3)
TRIGL SERPL-MCNC: 143 MG/DL
TSH SERPL DL<=0.05 MIU/L-ACNC: 1.42 UIU/ML (ref 0.3–5)
WBC # BLD: 5.9 K/UL (ref 3.5–11.3)

## 2022-11-03 ENCOUNTER — HOSPITAL ENCOUNTER (OUTPATIENT)
Dept: PHARMACY | Age: 57
Setting detail: THERAPIES SERIES
Discharge: HOME | End: 2022-11-03
Payer: COMMERCIAL

## 2022-11-03 DIAGNOSIS — I26.99 PULMONARY EMBOLISM, UNSPECIFIED CHRONICITY, UNSPECIFIED PULMONARY EMBOLISM TYPE, UNSPECIFIED WHETHER ACUTE COR PULMONALE PRESENT (HCC): Primary | ICD-10-CM

## 2022-11-03 DIAGNOSIS — Z51.81 ENCOUNTER FOR THERAPEUTIC DRUG MONITORING: ICD-10-CM

## 2022-11-03 DIAGNOSIS — Z79.01 ANTICOAGULATED ON COUMADIN: ICD-10-CM

## 2022-11-03 DIAGNOSIS — I82.409 DEEP VEIN THROMBOSIS (DVT) OF LOWER EXTREMITY, UNSPECIFIED CHRONICITY, UNSPECIFIED LATERALITY, UNSPECIFIED VEIN (HCC): ICD-10-CM

## 2022-11-03 LAB — POC INR: 1.6 (ref 0.8–1.2)

## 2022-11-03 PROCEDURE — 85610 PROTHROMBIN TIME: CPT

## 2022-11-03 PROCEDURE — 99212 OFFICE O/P EST SF 10 MIN: CPT

## 2022-11-03 PROCEDURE — 36416 COLLJ CAPILLARY BLOOD SPEC: CPT

## 2022-11-03 RX ORDER — MAGNESIUM OXIDE 400 MG/1
400 TABLET ORAL DAILY
COMMUNITY

## 2022-11-03 NOTE — PROGRESS NOTES
Medication Management [address]  [phone] (phone)  [phone] (fax)    Ms. Lexus Cervantes is a 62 y.o.  female with history of DVT, PE who presents today for anticoagulation monitoring and adjustment. Patient verifies current dosing regimen and tablet strength. No missed or extra doses. Patient denies s/s bleeding/bruising/swelling/SOB/chest pain  No blood in urine or stool. Patient states she had cabbage and other greens a few days ago. No changes in OTC agents/Herbals. Patient started magnesium 400 mg daily. No change in alcohol use or tobacco use. Patient is slightly more active with work. Patient denies headaches/dizziness/lightheadedness/falls. No vomiting/diarrhea or acute illness. No Procedures scheduled in the future at this time. Assessment:   Lab Results   Component Value Date    INR 1.60 (H) 11/03/2022    INR 2.20 (H) 10/04/2022    INR 2.00 (H) 09/13/2022     INR subtherapeutic   Recent Labs     11/03/22  1550   INR 1.60*     Patient is slightly subtherapeutic today, which is likely due to Vitamin K intake recently. Previously, patient was very well-controlled while on current regimen. Plan:  Coumadin 17.5 mg x 2 doses (11/3/22-11/4/22) then continue Coumadin 15 mg MWF and 10 mg TuThSaSu. Recheck INR in 2.5 week(s). Patient reminded to call the Anticoagulation Clinic with any signs or symptoms of bleeding or with any medication changes. Patient given instructions utilizing the teach back method. After visit summary printed and reviewed with patient. Discharged ambulatory in no apparent distress.     For Pharmacy Admin Tracking Only    Intervention Detail: Dose Adjustment: 1, reason: Therapy Optimization  Total # of Interventions Recommended: 1  Total # of Interventions Accepted: 1  Time Spent (min): 5240  [address], [place], BCPS  11/3/2022  4:31 PM

## 2022-11-21 ENCOUNTER — HOSPITAL ENCOUNTER (OUTPATIENT)
Dept: PHARMACY | Age: 57
Setting detail: THERAPIES SERIES
Discharge: HOME | End: 2022-11-21
Payer: COMMERCIAL

## 2022-11-21 DIAGNOSIS — I26.09 PULMONARY EMBOLISM WITH ACUTE COR PULMONALE, UNSPECIFIED CHRONICITY, UNSPECIFIED PULMONARY EMBOLISM TYPE (HCC): Primary | ICD-10-CM

## 2022-11-21 DIAGNOSIS — I82.409 DEEP VEIN THROMBOSIS (DVT) OF LOWER EXTREMITY, UNSPECIFIED CHRONICITY, UNSPECIFIED LATERALITY, UNSPECIFIED VEIN (HCC): ICD-10-CM

## 2022-11-21 DIAGNOSIS — Z51.81 ENCOUNTER FOR THERAPEUTIC DRUG MONITORING: ICD-10-CM

## 2022-11-21 DIAGNOSIS — Z79.01 ANTICOAGULATED ON COUMADIN: ICD-10-CM

## 2022-11-21 LAB — POC INR: 2.5 (ref 0.8–1.2)

## 2022-11-21 PROCEDURE — 99211 OFF/OP EST MAY X REQ PHY/QHP: CPT

## 2022-11-21 PROCEDURE — 85610 PROTHROMBIN TIME: CPT

## 2022-11-21 PROCEDURE — 36416 COLLJ CAPILLARY BLOOD SPEC: CPT

## 2022-11-21 NOTE — PROGRESS NOTES
Medication Management [address]  [phone] (phone)  [phone] (fax)    Ms. Jordan Barry is a 62 y.o.  female with history of DVT, PE who presents today for anticoagulation monitoring and adjustment. Patient verifies current dosing regimen and tablet strength. No missed or extra doses. Patient denies s/s bleeding/bruising/swelling/SOB/chest pain  No blood in urine or stool. No dietary changes. No changes in medication/OTC agents/Herbals. No change in alcohol use or tobacco use. No change in activity level. Patient denies dizziness/lightheadedness/falls. (+) HAs at baseline  No vomiting or acute illness. (+) diarrhea yesterday (11/20)  No Procedures scheduled in the future at this time. Assessment:   Lab Results   Component Value Date    INR 2.50 (H) 11/21/2022    INR 1.60 (H) 11/03/2022    INR 2.20 (H) 10/04/2022     INR subtherapeutic   Recent Labs     11/21/22  1536   INR 2.50*      Patient interview conducted by student pharmacist, reviewed with [facility] Abilio Hair 11/21/22 3:40 PM     Plan:  Continue Coumadin 15 mg MWF, 10 mg all other days. Recheck INR in 3 week(s). Patient reminded to call the Anticoagulation Clinic with any signs or symptoms of bleeding or with any medication changes. Patient given instructions utilizing the teach back method. After visit summary printed and reviewed with patient. Discharged ambulatory in no apparent distress.     For Pharmacy Admin Tracking Only  Time Spent (min): [address], PharmD, BCPS  11/21/2022  3:55 PM

## 2022-12-12 ENCOUNTER — APPOINTMENT (OUTPATIENT)
Dept: PHARMACY | Age: 57
End: 2022-12-12
Payer: COMMERCIAL

## 2022-12-15 ENCOUNTER — HOSPITAL ENCOUNTER (OUTPATIENT)
Dept: PHARMACY | Age: 57
Setting detail: THERAPIES SERIES
Discharge: HOME | End: 2022-12-15
Payer: COMMERCIAL

## 2022-12-15 DIAGNOSIS — Z79.01 ANTICOAGULATED ON COUMADIN: ICD-10-CM

## 2022-12-15 DIAGNOSIS — Z51.81 ENCOUNTER FOR THERAPEUTIC DRUG MONITORING: ICD-10-CM

## 2022-12-15 DIAGNOSIS — I82.409 DEEP VEIN THROMBOSIS (DVT) OF LOWER EXTREMITY, UNSPECIFIED CHRONICITY, UNSPECIFIED LATERALITY, UNSPECIFIED VEIN (HCC): ICD-10-CM

## 2022-12-15 DIAGNOSIS — I26.99 PULMONARY EMBOLISM, UNSPECIFIED CHRONICITY, UNSPECIFIED PULMONARY EMBOLISM TYPE, UNSPECIFIED WHETHER ACUTE COR PULMONALE PRESENT (HCC): Primary | ICD-10-CM

## 2022-12-15 LAB — POC INR: 2.4 (ref 0.8–1.2)

## 2022-12-15 PROCEDURE — 85610 PROTHROMBIN TIME: CPT

## 2022-12-15 PROCEDURE — 99211 OFF/OP EST MAY X REQ PHY/QHP: CPT

## 2022-12-15 PROCEDURE — 36416 COLLJ CAPILLARY BLOOD SPEC: CPT

## 2022-12-15 NOTE — PROGRESS NOTES
Medication Management [address]  [phone] (phone)  [phone] (fax)    Ms. Omid Mariano is a 62 y.o.  female with history of DVT/recurrent PE, per referral from ANTONIA Guzman, who presents today for Warfarin monitoring and adjustment (3 week visit). Patient verifies current dosing regimen and tablet strength. No missed or extra doses. Patient denies bleeding/swelling/SOB/chest pain. States has bruise left hip from bumping it at work; \"bruised easily even as a child. \"  No blood in urine or stool. Dietary changes: had less greens/cabbage since last visit, but plans to resume usual amount next week. No changes in medication/OTC agents/herbals. No change in alcohol use (other than will have a drink on Son) or tobacco use. No change in activity level. Had more stress. Patient denies dizziness/lightheadedness/falls. Took Tylenol for a minor headache last week. No vomiting/diarrhea or acute illness. No procedures scheduled in the future at this time. Assessment:   Lab Results   Component Value Date    INR 2.40 (H) 12/15/2022    INR 2.50 (H) 11/21/2022    INR 1.60 (H) 11/03/2022     INR therapeutic - goal 2-3. Recent Labs     12/15/22  1453   INR 2.40*        Plan:  POCT INR ordered/performed with 2 attempts/result reviewed. Continue PO Coumadin 15 mg MWF, 10 mg TThSS. Recheck INR in 4 week(s). Patient reminded to call the Anticoagulation Clinic with any signs or symptoms of bleeding or with any medication changes. Patient given instructions utilizing the teach back method. After visit summary printed and reviewed with patient. Discharged ambulatory in no apparent distress.

## 2023-01-12 ENCOUNTER — HOSPITAL ENCOUNTER (OUTPATIENT)
Dept: PHARMACY | Age: 58
Setting detail: THERAPIES SERIES
Discharge: HOME | End: 2023-01-12
Payer: COMMERCIAL

## 2023-01-12 DIAGNOSIS — I26.99 PULMONARY EMBOLISM, UNSPECIFIED CHRONICITY, UNSPECIFIED PULMONARY EMBOLISM TYPE, UNSPECIFIED WHETHER ACUTE COR PULMONALE PRESENT (HCC): Primary | ICD-10-CM

## 2023-01-12 DIAGNOSIS — Z51.81 ENCOUNTER FOR THERAPEUTIC DRUG MONITORING: ICD-10-CM

## 2023-01-12 DIAGNOSIS — Z79.01 ANTICOAGULATED ON COUMADIN: ICD-10-CM

## 2023-01-12 DIAGNOSIS — I82.409 DEEP VEIN THROMBOSIS (DVT) OF LOWER EXTREMITY, UNSPECIFIED CHRONICITY, UNSPECIFIED LATERALITY, UNSPECIFIED VEIN (HCC): ICD-10-CM

## 2023-01-12 LAB — POC INR: 2.5 (ref 0.8–1.2)

## 2023-01-12 PROCEDURE — 85610 PROTHROMBIN TIME: CPT

## 2023-01-12 PROCEDURE — 36416 COLLJ CAPILLARY BLOOD SPEC: CPT

## 2023-01-12 PROCEDURE — 99211 OFF/OP EST MAY X REQ PHY/QHP: CPT

## 2023-01-12 RX ORDER — TIRZEPATIDE 2.5 MG/.5ML
INJECTION, SOLUTION SUBCUTANEOUS
COMMUNITY
Start: 2023-01-11

## 2023-01-12 NOTE — PROGRESS NOTES
Medication Management [address]  [phone] (phone)  [phone] (fax)    Ms. Yandel Islas is a 62 y.o.  female with history of DVT/recurrent PE, per referral from ANTONIA Guzman, who presents today for Warfarin monitoring and adjustment (4 week visit). Patient verifies current dosing regimen and tablet strength. No missed or extra doses. Patient denies bleeding/SOB/chest pain. No blood in urine or stool. Dietary changes: had less Vitamin K foods past 2 weeks. Changes in medication/OTC agents/herbals: to start Mounjaro (no Coumadin interaction, per clinic pharmacist). No change in alcohol use or tobacco use. No change in activity level, but plans to get back to exercising. Patient denies headaches/dizziness/lightheadedness. Billey More onto left knee one day (did not hit head). States bruise is fading. Swelling relieved with ice. Took Tylenol 1 day. No vomiting/diarrhea or acute illness. No procedures scheduled in the future at this time. Assessment:   Lab Results   Component Value Date    INR 2.50 (H) 01/12/2023    INR 2.40 (H) 12/15/2022    INR 2.50 (H) 11/21/2022     INR therapeutic - goal 2-3. Recent Labs     01/12/23  1515   INR 2.50*        Plan:  POCT INR ordered/performed/result reviewed. Continue PO Coumadin 15 mg MWF, 10 mg TThSS. Recheck INR in 4 week(s). Patient reminded to call the Anticoagulation Clinic with any signs or symptoms of bleeding or with any medication changes. Patient given instructions utilizing the teach back method. After visit summary printed and reviewed with patient. Discharged ambulatory in no apparent distress.

## 2023-01-27 ENCOUNTER — TELEPHONE (OUTPATIENT)
Dept: PHARMACY | Age: 58
End: 2023-01-27

## 2023-01-27 NOTE — TELEPHONE ENCOUNTER
Patient called asking if she could get a Shingles shot and also wants to know if she should get the next Covid Booster.  She would like you to call her back regarding this since she is on Coumadin

## 2023-02-09 ENCOUNTER — HOSPITAL ENCOUNTER (OUTPATIENT)
Dept: PHARMACY | Age: 58
Setting detail: THERAPIES SERIES
Discharge: HOME | End: 2023-02-09
Payer: COMMERCIAL

## 2023-02-09 DIAGNOSIS — Z79.01 ANTICOAGULATED ON COUMADIN: ICD-10-CM

## 2023-02-09 DIAGNOSIS — Z51.81 ENCOUNTER FOR THERAPEUTIC DRUG MONITORING: ICD-10-CM

## 2023-02-09 DIAGNOSIS — I82.409 DEEP VEIN THROMBOSIS (DVT) OF LOWER EXTREMITY, UNSPECIFIED CHRONICITY, UNSPECIFIED LATERALITY, UNSPECIFIED VEIN (HCC): ICD-10-CM

## 2023-02-09 DIAGNOSIS — I26.99 PULMONARY EMBOLISM, UNSPECIFIED CHRONICITY, UNSPECIFIED PULMONARY EMBOLISM TYPE, UNSPECIFIED WHETHER ACUTE COR PULMONALE PRESENT (HCC): Primary | ICD-10-CM

## 2023-02-09 LAB — POC INR: 2.3 (ref 0.8–1.2)

## 2023-02-09 PROCEDURE — 85610 PROTHROMBIN TIME: CPT

## 2023-02-09 PROCEDURE — 99212 OFFICE O/P EST SF 10 MIN: CPT

## 2023-02-09 PROCEDURE — 36416 COLLJ CAPILLARY BLOOD SPEC: CPT

## 2023-02-09 RX ORDER — WARFARIN SODIUM 5 MG/1
TABLET ORAL
Qty: 220 TABLET | Refills: 1 | Status: SHIPPED | OUTPATIENT
Start: 2023-02-09

## 2023-02-09 NOTE — PROGRESS NOTES
Medication Management [address]   [phone] (phone)  [phone] (fax)    Ms. Jimmy Rothman is a 62 y.o.  female with history of DVT/recurrent PE, per referral from ANTONIA Guzman, who presents today for Warfarin monitoring and adjustment (4 week visit). Patient verifies current dosing regimen and tablet strength. No missed or extra doses. Patient denies bleeding/bruising/swelling/SOB/chest pain. No blood in urine or stool. No dietary changes. No changes in medication/OTC agents/herbals. No change in alcohol use or tobacco use. No change in activity level. Patient denies dizziness/lightheadedness/falls. Took 1 Tylenol for each of 2 headaches. No vomiting/diarrhea or acute illness. No procedures scheduled in the future at this time. Has vascular duplex tomorrow to evaluate cramping of thighs, right more than left. Assessment:   Lab Results   Component Value Date    INR 2.30 (H) 02/09/2023    INR 2.50 (H) 01/12/2023    INR 2.40 (H) 12/15/2022     INR therapeutic - goal 2-3. Recent Labs     02/09/23  1516   INR 2.30*        Plan:  POCT INR performed/result reviewed. Continue PO Coumadin 15 mg MWF, 10 mg TThSS. Recheck INR in 5 week(s); patient wanted to go to 6 weeks, but compromised on 5. Patient reminded to call the Anticoagulation Clinic with any signs or symptoms of bleeding or with any medication changes. Patient given instructions utilizing the teach back method. After visit summary printed and reviewed with patient. Discharged ambulatory in no apparent distress. Prescription sent electronically by clinic pharmacist under new medical director.     For Pharmacy Admin Tracking Only    Intervention Detail: Refill(s) Provided  Total # of Interventions Recommended: 1  Total # of Interventions Accepted: 1  Time Spent (min):  22

## 2023-02-10 ENCOUNTER — HOSPITAL ENCOUNTER (OUTPATIENT)
Dept: INTERVENTIONAL RADIOLOGY/VASCULAR | Age: 58
Discharge: HOME | End: 2023-02-10
Payer: COMMERCIAL

## 2023-02-10 DIAGNOSIS — R22.41 LOCALIZED SWELLING, MASS AND LUMP, RIGHT LOWER LIMB: ICD-10-CM

## 2023-02-10 DIAGNOSIS — I73.9 PERIPHERAL VASCULAR DISEASE, UNSPECIFIED (HCC): ICD-10-CM

## 2023-02-10 PROCEDURE — 93970 EXTREMITY STUDY: CPT

## 2023-02-10 PROCEDURE — 93925 LOWER EXTREMITY STUDY: CPT

## 2023-03-13 RX ORDER — WARFARIN SODIUM 5 MG/1
TABLET ORAL
Qty: 240 TABLET | Refills: 1 | Status: SHIPPED | OUTPATIENT
Start: 2023-03-13

## 2023-03-13 NOTE — PROGRESS NOTES
caution. Discharged ambulatory in no apparent distress, wearing mask. After visit summary printed and reviewed with patient.       Medications reviewed and updated on home medication list.    Influenza vaccine:     [] given    [x] declined   [x] received previously   [] plans to receive at a later time   [] refused    [x] documented in Epic
STG (3-5 sessions) Amb 100 feet w/ RW supervision

## 2023-03-16 ENCOUNTER — HOSPITAL ENCOUNTER (OUTPATIENT)
Dept: PHARMACY | Age: 58
Setting detail: THERAPIES SERIES
Discharge: HOME | End: 2023-03-16
Payer: COMMERCIAL

## 2023-03-16 DIAGNOSIS — I82.409 DEEP VEIN THROMBOSIS (DVT) OF LOWER EXTREMITY, UNSPECIFIED CHRONICITY, UNSPECIFIED LATERALITY, UNSPECIFIED VEIN (HCC): ICD-10-CM

## 2023-03-16 DIAGNOSIS — Z79.01 ANTICOAGULATED ON COUMADIN: ICD-10-CM

## 2023-03-16 DIAGNOSIS — Z51.81 ENCOUNTER FOR THERAPEUTIC DRUG MONITORING: ICD-10-CM

## 2023-03-16 DIAGNOSIS — I26.99 PULMONARY EMBOLISM, UNSPECIFIED CHRONICITY, UNSPECIFIED PULMONARY EMBOLISM TYPE, UNSPECIFIED WHETHER ACUTE COR PULMONALE PRESENT (HCC): Primary | ICD-10-CM

## 2023-03-16 LAB — POC INR: 2.7 (ref 0.8–1.2)

## 2023-03-16 PROCEDURE — 99211 OFF/OP EST MAY X REQ PHY/QHP: CPT

## 2023-03-16 PROCEDURE — 36416 COLLJ CAPILLARY BLOOD SPEC: CPT

## 2023-03-16 PROCEDURE — 85610 PROTHROMBIN TIME: CPT

## 2023-03-16 NOTE — PROGRESS NOTES
Medication Management [address]   [phone] (phone)  [phone] (fax)    Ms. Queta Donovan is a 62 y.o.  female with history of DVT/recurrent PE, per referral from ANTONIA Guzman, who presents today for Warfarin monitoring and adjustment (5 week visit). Patient verifies current dosing regimen and tablet strength. No missed or extra doses. Patient denies bleeding/bruising/SOB/chest pain. Has usual swelling of legs - both broken in past.  No blood in urine or stool. Dietary changes: had a salad yesterday that was unusual.  No changes in medication/OTC agents/herbals. No change in alcohol use or tobacco use. No change in activity level, but will be increasing (back doctor released her to increase activity). Still has cramps in thighs (vascular duplex negative). States he told her she has to stretch, and that cramps were from inactivity after back surgery. Patient denies headaches/dizziness/lightheadedness/falls. No vomiting/diarrhea or acute illness. No procedures scheduled in the future at this time. Assessment:   Lab Results   Component Value Date    INR 2.70 (H) 03/16/2023    INR 2.30 (H) 02/09/2023    INR 2.50 (H) 01/12/2023     INR therapeutic - goal 2-3. Recent Labs     03/16/23  1518   INR 2.70*        Plan:  POCT INR performed/result reviewed. Continue PO Coumadin 15 mg MWF, 10 mg TThSS. Recheck INR in 5 week(s). Patient reminded to call the Anticoagulation Clinic with any signs or symptoms of bleeding or with any medication changes. Patient given instructions utilizing the teach back method. After visit summary printed and reviewed with patient. Discharged ambulatory in no apparent distress.     For Pharmacy Admin Tracking Only    Time Spent (min): 20

## 2023-03-20 RX ORDER — WARFARIN SODIUM 5 MG/1
TABLET ORAL
Qty: 220 TABLET | Refills: 1 | Status: SHIPPED | OUTPATIENT
Start: 2023-03-20

## 2023-04-20 ENCOUNTER — APPOINTMENT (OUTPATIENT)
Dept: PHARMACY | Age: 58
End: 2023-04-20
Payer: COMMERCIAL

## 2023-04-24 ENCOUNTER — HOSPITAL ENCOUNTER (OUTPATIENT)
Dept: PHARMACY | Age: 58
Setting detail: THERAPIES SERIES
Discharge: HOME | End: 2023-04-24
Payer: COMMERCIAL

## 2023-04-24 DIAGNOSIS — Z51.81 ENCOUNTER FOR THERAPEUTIC DRUG MONITORING: ICD-10-CM

## 2023-04-24 DIAGNOSIS — I26.99 PULMONARY EMBOLISM WITHOUT ACUTE COR PULMONALE, UNSPECIFIED CHRONICITY, UNSPECIFIED PULMONARY EMBOLISM TYPE (HCC): Primary | ICD-10-CM

## 2023-04-24 DIAGNOSIS — Z79.01 ANTICOAGULATED ON COUMADIN: ICD-10-CM

## 2023-04-24 DIAGNOSIS — I82.409 DEEP VEIN THROMBOSIS (DVT) OF LOWER EXTREMITY, UNSPECIFIED CHRONICITY, UNSPECIFIED LATERALITY, UNSPECIFIED VEIN (HCC): ICD-10-CM

## 2023-04-24 LAB — POC INR: 2 (ref 0.8–1.2)

## 2023-04-24 PROCEDURE — 85610 PROTHROMBIN TIME: CPT

## 2023-04-24 PROCEDURE — 36416 COLLJ CAPILLARY BLOOD SPEC: CPT

## 2023-04-24 PROCEDURE — 99211 OFF/OP EST MAY X REQ PHY/QHP: CPT

## 2023-04-24 NOTE — PROGRESS NOTES
Medication Management [address]  [phone] (phone)  [phone] (fax)    Ms. Lexus Cervantes is a 62 y.o.  female with history of DVT, PE who presents today for anticoagulation monitoring and adjustment. Patient verifies current dosing regimen and tablet strength. No missed or extra doses. Patient denies s/s bleeding/bruising/swelling/SOB/chest pain  No blood in urine or stool. No dietary changes. Changes in medication/OTC agents/Herbals. - Patient states that she is currently holding atorvastatin. She stated that he has been having leg cramps and they are trying to determine if the atorvastatin is causing it. No change in alcohol use or tobacco use. Change in activity level. - May be increasing as she will now be mowing grass once a week. Patient denies headaches/dizziness/lightheadedness/falls. No vomiting/diarrhea or acute illness. No Procedures scheduled in the future at this time. Assessment:   Lab Results   Component Value Date    INR 2.00 (H) 04/24/2023    INR 2.70 (H) 03/16/2023    INR 2.30 (H) 02/09/2023     INR therapeutic   Recent Labs     04/24/23  1610   INR 2.00*     Plan:  Continue Coumadin 15 mg MWF, 10 mg all other days. Recheck INR in 5 week(s). Patient reminded to call the Anticoagulation Clinic with any signs or symptoms of bleeding or with any medication changes. Patient given instructions utilizing the teach back method. After visit summary printed and reviewed with patient. Discharged ambulatory in no apparent distress.     For Pharmacy Admin Tracking Only  Time Spent (min): [address], PharmD, BCPS  4/24/2023  4:24 PM

## 2023-05-30 ENCOUNTER — HOSPITAL ENCOUNTER (OUTPATIENT)
Dept: PHARMACY | Age: 58
Setting detail: THERAPIES SERIES
Discharge: HOME | End: 2023-05-30
Payer: COMMERCIAL

## 2023-05-30 DIAGNOSIS — Z51.81 ENCOUNTER FOR THERAPEUTIC DRUG MONITORING: ICD-10-CM

## 2023-05-30 DIAGNOSIS — Z79.01 ANTICOAGULATED ON COUMADIN: ICD-10-CM

## 2023-05-30 DIAGNOSIS — I82.409 DEEP VEIN THROMBOSIS (DVT) OF LOWER EXTREMITY, UNSPECIFIED CHRONICITY, UNSPECIFIED LATERALITY, UNSPECIFIED VEIN (HCC): ICD-10-CM

## 2023-05-30 DIAGNOSIS — I26.99 PULMONARY EMBOLISM, UNSPECIFIED CHRONICITY, UNSPECIFIED PULMONARY EMBOLISM TYPE, UNSPECIFIED WHETHER ACUTE COR PULMONALE PRESENT (HCC): Primary | ICD-10-CM

## 2023-05-30 LAB — POC INR: 2.9 (ref 0.8–1.2)

## 2023-05-30 PROCEDURE — 85610 PROTHROMBIN TIME: CPT

## 2023-05-30 PROCEDURE — 99211 OFF/OP EST MAY X REQ PHY/QHP: CPT

## 2023-05-30 PROCEDURE — 36416 COLLJ CAPILLARY BLOOD SPEC: CPT

## 2023-05-30 NOTE — PROGRESS NOTES
Medication Management [address]   [phone] (phone)  [phone] (fax)    Ms. Marlo Cisneros is a 62 y.o.  female with history of DVT, PE who presents today for anticoagulation monitoring and adjustment. Patient verifies current dosing regimen and tablet strength. No missed or extra doses. Patient denies s/s bleeding/bruising/swelling/SOB/chest pain  No blood in urine or stool. No dietary changes. No changes in OTC agents/Herbals. Lipitor remains on hold. Patient takes Mounjaro 5 mg weekly  No change in tobacco use. Patient had a few drinks over the weekend at a cookout/graduation party. No change in activity level. Patient denies headaches/dizziness/lightheadedness/falls. No vomiting/diarrhea or acute illness. No Procedures scheduled in the future at this time. Assessment:   Lab Results   Component Value Date    INR 2.90 (H) 05/30/2023    INR 2.00 (H) 04/24/2023    INR 2.70 (H) 03/16/2023     INR therapeutic   Recent Labs     05/30/23  1610   INR 2.90*     Patient has been stable while on current regimen since the end of November 2022. Plan:  Continue Coumadin 15 mg MWF and 10 mg TuThSaSu. Recheck INR in 6 week(s). Patient reminded to call the Anticoagulation Clinic with any signs or symptoms of bleeding or with any medication changes. Patient given instructions utilizing the teach back method. After visit summary printed and reviewed with patient. Discharged ambulatory in no apparent distress.     For Pharmacy Admin Tracking Only    Total # of Interventions Recommended: 0  Total # of Interventions Accepted: 0  Time Spent (min): 0386  [facility], PharmD, BCPS  5/30/2023  4:21 PM

## 2023-06-01 ENCOUNTER — OFFICE VISIT (OUTPATIENT)
Dept: CARDIOLOGY CLINIC | Age: 58
End: 2023-06-01
Payer: COMMERCIAL

## 2023-06-01 VITALS
DIASTOLIC BLOOD PRESSURE: 87 MMHG | WEIGHT: 216.2 LBS | BODY MASS INDEX: 36.91 KG/M2 | HEART RATE: 67 BPM | SYSTOLIC BLOOD PRESSURE: 142 MMHG | HEIGHT: 64 IN

## 2023-06-01 DIAGNOSIS — R94.31 ABNORMAL EKG: ICD-10-CM

## 2023-06-01 DIAGNOSIS — E78.5 DYSLIPIDEMIA: Primary | ICD-10-CM

## 2023-06-01 DIAGNOSIS — R93.1 ABNORMAL ECHOCARDIOGRAM: ICD-10-CM

## 2023-06-01 DIAGNOSIS — R00.1 SINUS BRADYCARDIA: ICD-10-CM

## 2023-06-01 PROCEDURE — 99214 OFFICE O/P EST MOD 30 MIN: CPT | Performed by: INTERNAL MEDICINE

## 2023-06-01 PROCEDURE — 93000 ELECTROCARDIOGRAM COMPLETE: CPT | Performed by: INTERNAL MEDICINE

## 2023-06-01 NOTE — PROGRESS NOTES
montelukast (SINGULAIR) 10 MG tablet Take 1 tablet by mouth daily Indications: Condition caused by an Allergy      alendronate (FOSAMAX) 70 MG tablet Take 1 tablet by mouth every 28 days Indications: Osteopenia      metFORMIN (GLUCOPHAGE) 500 MG tablet Take 1 tablet by mouth 2 times daily (with meals)  0    lisinopril (PRINIVIL;ZESTRIL) 5 MG tablet Take 1 tablet by mouth daily Indications: Diabetes, Raised Blood Pressure      Albuterol Sulfate (VENTOLIN HFA IN) Inhale 2 puffs into the lungs every 4 hours as needed Indications: Asthma      Acetaminophen (TYLENOL PO) Take 1,000 mg by mouth as needed Indications: Pain Don't take more than 3,000 mg per day. atorvastatin (LIPITOR) 20 MG tablet Take 20 mg by mouth daily Indications: Blood Cholesterol Abnormal (Patient not taking: Reported on 5/30/2023)       No current facility-administered medications for this visit. Review of Systems -     General ROS: negative  Psychological ROS: negative  Hematological and Lymphatic ROS: No history of blood clots or bleeding disorder. Respiratory ROS: no cough, shortness of breath, or wheezing  Cardiovascular ROS: no chest pain or dyspnea on exertion  Gastrointestinal ROS: negative  Genito-Urinary ROS: no dysuria, trouble voiding, or hematuria  Musculoskeletal ROS: negative  Neurological ROS: no TIA or stroke symptoms  Dermatological ROS: negative      Blood pressure (!) 142/87, pulse 67, height 5' 4\" (1.626 m), weight 216 lb 3.2 oz (98.1 kg).         Physical Examination:    General appearance - alert, well appearing, and in no distress  Mental status - alert, oriented to person, place, and time  Neck - supple, no significant adenopathy, no JVD, or carotid bruits  Chest - clear to auscultation, no wheezes, rales or rhonchi, symmetric air entry  Heart - normal rate, regular rhythm, normal S1, S2, no murmurs, rubs, clicks or gallops  Abdomen - soft, nontender, nondistended, no masses or organomegaly  Neurological -

## 2023-06-27 ENCOUNTER — APPOINTMENT (OUTPATIENT)
Dept: GENERAL RADIOLOGY | Age: 58
End: 2023-06-27
Payer: COMMERCIAL

## 2023-06-27 ENCOUNTER — HOSPITAL ENCOUNTER (EMERGENCY)
Age: 58
Discharge: HOME | End: 2023-06-27
Attending: EMERGENCY MEDICINE
Payer: COMMERCIAL

## 2023-06-27 VITALS
TEMPERATURE: 98.4 F | HEART RATE: 60 BPM | OXYGEN SATURATION: 100 % | DIASTOLIC BLOOD PRESSURE: 86 MMHG | SYSTOLIC BLOOD PRESSURE: 117 MMHG | RESPIRATION RATE: 19 BRPM

## 2023-06-27 DIAGNOSIS — R55 VASOVAGAL NEAR-SYNCOPE: Primary | ICD-10-CM

## 2023-06-27 LAB
ALBUMIN SERPL BCG-MCNC: 4.1 G/DL (ref 3.5–5.1)
ALP SERPL-CCNC: 83 U/L (ref 38–126)
ALT SERPL W/O P-5'-P-CCNC: 19 U/L (ref 11–66)
ANION GAP SERPL CALC-SCNC: 16 MEQ/L (ref 8–16)
APTT PPP: 41 SECONDS (ref 22–38)
AST SERPL-CCNC: 20 U/L (ref 5–40)
BASOPHILS ABSOLUTE: 0 THOU/MM3 (ref 0–0.1)
BASOPHILS NFR BLD AUTO: 0.6 %
BILIRUB SERPL-MCNC: 0.4 MG/DL (ref 0.3–1.2)
BUN SERPL-MCNC: 15 MG/DL (ref 7–22)
CALCIUM SERPL-MCNC: 9.4 MG/DL (ref 8.5–10.5)
CHLORIDE SERPL-SCNC: 102 MEQ/L (ref 98–111)
CO2 SERPL-SCNC: 20 MEQ/L (ref 23–33)
CREAT SERPL-MCNC: 1.2 MG/DL (ref 0.4–1.2)
DEPRECATED RDW RBC AUTO: 50.2 FL (ref 35–45)
EOSINOPHIL NFR BLD AUTO: 1.2 %
EOSINOPHILS ABSOLUTE: 0.1 THOU/MM3 (ref 0–0.4)
ERYTHROCYTE [DISTWIDTH] IN BLOOD BY AUTOMATED COUNT: 15.4 % (ref 11.5–14.5)
GFR SERPL CREATININE-BSD FRML MDRD: 52 ML/MIN/1.73M2
GLUCOSE SERPL-MCNC: 94 MG/DL (ref 70–108)
HCT VFR BLD AUTO: 42.1 % (ref 37–47)
HGB BLD-MCNC: 13.6 GM/DL (ref 12–16)
IMM GRANULOCYTES # BLD AUTO: 0.01 THOU/MM3 (ref 0–0.07)
IMM GRANULOCYTES NFR BLD AUTO: 0.2 %
INR PPP: 1.93 (ref 0.85–1.13)
LYMPHOCYTES ABSOLUTE: 2.5 THOU/MM3 (ref 1–4.8)
LYMPHOCYTES NFR BLD AUTO: 49.3 %
MCH RBC QN AUTO: 28.6 PG (ref 26–33)
MCHC RBC AUTO-ENTMCNC: 32.3 GM/DL (ref 32.2–35.5)
MCV RBC AUTO: 88.6 FL (ref 81–99)
MONOCYTES ABSOLUTE: 0.3 THOU/MM3 (ref 0.4–1.3)
MONOCYTES NFR BLD AUTO: 6.5 %
NEUTROPHILS NFR BLD AUTO: 42.2 %
NRBC BLD AUTO-RTO: 0 /100 WBC
OSMOLALITY SERPL CALC.SUM OF ELEC: 276.3 MOSMOL/KG (ref 275–300)
PLATELET # BLD AUTO: 342 THOU/MM3 (ref 130–400)
PMV BLD AUTO: 10.9 FL (ref 9.4–12.4)
POTASSIUM SERPL-SCNC: 3.7 MEQ/L (ref 3.5–5.2)
PROT SERPL-MCNC: 8 G/DL (ref 6.1–8)
RBC # BLD AUTO: 4.75 MILL/MM3 (ref 4.2–5.4)
SEGMENTED NEUTROPHILS ABSOLUTE COUNT: 2.1 THOU/MM3 (ref 1.8–7.7)
SODIUM SERPL-SCNC: 138 MEQ/L (ref 135–145)
TROPONIN T: < 0.01 NG/ML
WBC # BLD AUTO: 5 THOU/MM3 (ref 4.8–10.8)

## 2023-06-27 PROCEDURE — 84484 ASSAY OF TROPONIN QUANT: CPT

## 2023-06-27 PROCEDURE — 85025 COMPLETE CBC W/AUTO DIFF WBC: CPT

## 2023-06-27 PROCEDURE — 99285 EMERGENCY DEPT VISIT HI MDM: CPT

## 2023-06-27 PROCEDURE — 93005 ELECTROCARDIOGRAM TRACING: CPT | Performed by: STUDENT IN AN ORGANIZED HEALTH CARE EDUCATION/TRAINING PROGRAM

## 2023-06-27 PROCEDURE — 36415 COLL VENOUS BLD VENIPUNCTURE: CPT

## 2023-06-27 PROCEDURE — 85610 PROTHROMBIN TIME: CPT

## 2023-06-27 PROCEDURE — 2580000003 HC RX 258: Performed by: STUDENT IN AN ORGANIZED HEALTH CARE EDUCATION/TRAINING PROGRAM

## 2023-06-27 PROCEDURE — 80053 COMPREHEN METABOLIC PANEL: CPT

## 2023-06-27 PROCEDURE — 85730 THROMBOPLASTIN TIME PARTIAL: CPT

## 2023-06-27 PROCEDURE — 71046 X-RAY EXAM CHEST 2 VIEWS: CPT

## 2023-06-27 RX ORDER — 0.9 % SODIUM CHLORIDE 0.9 %
500 INTRAVENOUS SOLUTION INTRAVENOUS ONCE
Status: COMPLETED | OUTPATIENT
Start: 2023-06-27 | End: 2023-06-27

## 2023-06-27 RX ORDER — CITALOPRAM 10 MG/1
10 TABLET ORAL DAILY
COMMUNITY
Start: 2023-06-19

## 2023-06-27 RX ORDER — HYDROXYZINE HYDROCHLORIDE 25 MG/1
25 TABLET, FILM COATED ORAL EVERY 8 HOURS PRN
COMMUNITY
Start: 2023-06-19

## 2023-06-27 RX ADMIN — SODIUM CHLORIDE 500 ML: 9 INJECTION, SOLUTION INTRAVENOUS at 21:20

## 2023-06-28 ENCOUNTER — TELEPHONE (OUTPATIENT)
Dept: PHARMACY | Age: 58
End: 2023-06-28

## 2023-06-28 PROCEDURE — 93010 ELECTROCARDIOGRAM REPORT: CPT | Performed by: INTERNAL MEDICINE

## 2023-07-01 LAB
EKG ATRIAL RATE: 60 BPM
EKG P AXIS: 41 DEGREES
EKG P-R INTERVAL: 130 MS
EKG Q-T INTERVAL: 404 MS
EKG QRS DURATION: 84 MS
EKG QTC CALCULATION (BAZETT): 404 MS
EKG R AXIS: 13 DEGREES
EKG T AXIS: 27 DEGREES
EKG VENTRICULAR RATE: 60 BPM

## 2023-07-11 ENCOUNTER — HOSPITAL ENCOUNTER (OUTPATIENT)
Dept: PHARMACY | Age: 58
Setting detail: THERAPIES SERIES
Discharge: HOME | End: 2023-07-11
Payer: COMMERCIAL

## 2023-07-11 DIAGNOSIS — Z51.81 ENCOUNTER FOR THERAPEUTIC DRUG MONITORING: ICD-10-CM

## 2023-07-11 DIAGNOSIS — I26.99 PULMONARY EMBOLISM, UNSPECIFIED CHRONICITY, UNSPECIFIED PULMONARY EMBOLISM TYPE, UNSPECIFIED WHETHER ACUTE COR PULMONALE PRESENT (HCC): Primary | ICD-10-CM

## 2023-07-11 DIAGNOSIS — I82.409 DEEP VEIN THROMBOSIS (DVT) OF LOWER EXTREMITY, UNSPECIFIED CHRONICITY, UNSPECIFIED LATERALITY, UNSPECIFIED VEIN (HCC): ICD-10-CM

## 2023-07-11 DIAGNOSIS — Z79.01 ANTICOAGULATED ON COUMADIN: ICD-10-CM

## 2023-07-11 LAB — POC INR: 2.9 (ref 0.8–1.2)

## 2023-07-11 PROCEDURE — 36416 COLLJ CAPILLARY BLOOD SPEC: CPT

## 2023-07-11 PROCEDURE — 85610 PROTHROMBIN TIME: CPT

## 2023-07-11 PROCEDURE — 99211 OFF/OP EST MAY X REQ PHY/QHP: CPT

## 2023-07-11 NOTE — PROGRESS NOTES
Medication Management [address] [place] [address]  [phone] (phone)  [phone] (fax)    Ms. Elias Delgado is a 62 y.o.  female with history of DVT, PE who presents today for anticoagulation monitoring and adjustment. Patient verifies current dosing regimen and tablet strength. No missed or extra doses. Patient denies s/s bleeding/bruising/swelling. Patient had SOB a few weeks ago, but states she was cleared by ED and was thought to have dehydration. No blood in urine or stool. No dietary changes. No changes in OTC agents/Herbals. Patient is now taking Celexa 10 mg daily. She no longer takes Lipitor. No change in alcohol use or tobacco use. No change in activity level. Patient denies falls. No vomiting/diarrhea or acute illness. No Procedures scheduled in the future at this time. Assessment:   Lab Results   Component Value Date    INR 2.90 (H) 07/11/2023    INR 1.93 (H) 06/27/2023    INR 2.90 (H) 05/30/2023     INR therapeutic   Recent Labs     07/11/23  1605   INR 2.90*     Patient has been stable while on current regimen since the end of November 2022. Plan:  Continue Coumadin 15 mg MWF and 10 mg TuThSaSu. Recheck INR in 6 week(s). Patient reminded to call the Anticoagulation Clinic with any signs or symptoms of bleeding or with any medication changes. Patient given instructions utilizing the teach back method. After visit summary printed and reviewed with patient. Discharged ambulatory in no apparent distress.     For Pharmacy Admin Tracking Only    Total # of Interventions Recommended: 0  Total # of Interventions Accepted: 0  Time Spent (min): [address], PharmD, [facility]  7/11/2023  4:16 PM

## 2023-08-08 RX ORDER — WARFARIN SODIUM 5 MG/1
TABLET ORAL
Qty: 220 TABLET | Refills: 3 | Status: SHIPPED | OUTPATIENT
Start: 2023-08-08

## 2023-08-08 NOTE — TELEPHONE ENCOUNTER
Component Ref Range & Units 7/11/23 1605 5/30/23 1610 4/24/23 1610 3/16/23 1518 2/9/23 1516 1/12/23 1515 12/15/22 1453   POC INR 0.80 - 1.20 2.90 High   2.90 High  CM  2.00 High  CM  2.70 High  CM  2.30 High  CM  2.50 High

## 2023-08-22 ENCOUNTER — HOSPITAL ENCOUNTER (OUTPATIENT)
Dept: PHARMACY | Age: 58
Setting detail: THERAPIES SERIES
Discharge: HOME | End: 2023-08-22
Payer: COMMERCIAL

## 2023-08-22 DIAGNOSIS — Z51.81 ENCOUNTER FOR THERAPEUTIC DRUG MONITORING: ICD-10-CM

## 2023-08-22 DIAGNOSIS — Z79.01 ANTICOAGULATED ON COUMADIN: ICD-10-CM

## 2023-08-22 DIAGNOSIS — I82.409 DEEP VEIN THROMBOSIS (DVT) OF LOWER EXTREMITY, UNSPECIFIED CHRONICITY, UNSPECIFIED LATERALITY, UNSPECIFIED VEIN (HCC): ICD-10-CM

## 2023-08-22 DIAGNOSIS — I26.99 PULMONARY EMBOLISM, UNSPECIFIED CHRONICITY, UNSPECIFIED PULMONARY EMBOLISM TYPE, UNSPECIFIED WHETHER ACUTE COR PULMONALE PRESENT (HCC): Primary | ICD-10-CM

## 2023-08-22 LAB — POC INR: 2.7 (ref 0.8–1.2)

## 2023-08-22 PROCEDURE — 85610 PROTHROMBIN TIME: CPT

## 2023-08-22 PROCEDURE — 36416 COLLJ CAPILLARY BLOOD SPEC: CPT

## 2023-08-22 PROCEDURE — 99211 OFF/OP EST MAY X REQ PHY/QHP: CPT

## 2023-08-22 NOTE — PROGRESS NOTES
Medication Management [address] [place] [address]  [phone] (phone)  [phone] (fax)    Ms. Nelda Edwards is a 62 y.o.  female with history of DVT, PE who presents today for anticoagulation monitoring and adjustment. Patient verifies current dosing regimen and tablet strength. No missed or extra doses. Patient denies s/s bleeding/bruising/swelling/SOB  No blood in urine or stool. No dietary changes. Pt reports eating less vitamin K foods over the last few weeks, but plans to go back to her normal diet soon. No changes in medication/OTC agents/Herbals. Pt is no longer taking celexa (stopped taking 4 weeks ago). No change in alcohol use or tobacco use. No change in activity level. Patient denies falls. No vomiting/diarrhea or acute illness. No Procedures scheduled in the future at this time. Assessment:   Goal 2-3    Lab Results   Component Value Date    INR 2.70 (H) 08/22/2023    INR 2.90 (H) 07/11/2023    INR 1.93 (H) 06/27/2023     INR therapeutic   Recent Labs     08/22/23  1604   INR 2.70*     Patient interview completed and discussed with pharmacist by Esteban Nayak, Pharm D Candidate,     Plan:  Continue Coumadin 15 mg MWF and 10 mg TuThSaSu. Recheck INR in 6 week(s). Patient reminded to call the Anticoagulation Clinic with any signs or symptoms of bleeding or with any medication changes. Patient given instructions utilizing the teach back method. After visit summary printed and reviewed with patient. Discharged ambulatory in no apparent distress.     Rudy Giraldo RP  8/22/2023 4:25 PM     For Pharmacy Admin Tracking Only    Time Spent (min): 20

## 2023-10-05 ENCOUNTER — HOSPITAL ENCOUNTER (OUTPATIENT)
Dept: PHARMACY | Age: 58
Setting detail: THERAPIES SERIES
Discharge: HOME | End: 2023-10-05
Payer: COMMERCIAL

## 2023-10-05 DIAGNOSIS — Z51.81 ENCOUNTER FOR THERAPEUTIC DRUG MONITORING: ICD-10-CM

## 2023-10-05 DIAGNOSIS — I82.409 DEEP VEIN THROMBOSIS (DVT) OF LOWER EXTREMITY, UNSPECIFIED CHRONICITY, UNSPECIFIED LATERALITY, UNSPECIFIED VEIN (HCC): ICD-10-CM

## 2023-10-05 DIAGNOSIS — I26.99 PULMONARY EMBOLISM, UNSPECIFIED CHRONICITY, UNSPECIFIED PULMONARY EMBOLISM TYPE, UNSPECIFIED WHETHER ACUTE COR PULMONALE PRESENT (HCC): Primary | ICD-10-CM

## 2023-10-05 DIAGNOSIS — Z79.01 ANTICOAGULATED ON COUMADIN: ICD-10-CM

## 2023-10-05 LAB — POC INR: 2.4 (ref 0.8–1.2)

## 2023-10-05 PROCEDURE — 85610 PROTHROMBIN TIME: CPT | Performed by: PHARMACIST

## 2023-10-05 PROCEDURE — 36416 COLLJ CAPILLARY BLOOD SPEC: CPT | Performed by: PHARMACIST

## 2023-10-05 PROCEDURE — 99211 OFF/OP EST MAY X REQ PHY/QHP: CPT | Performed by: PHARMACIST

## 2023-10-05 NOTE — PROGRESS NOTES
Medication Management [address] [place] [address]  [phone] (phone)  [phone] (fax)    Ms. Carlin Aparicio is a 62 y.o.  female with history of DVT, PE who presents today for anticoagulation monitoring and adjustment. Patient verifies current dosing regimen and tablet strength. No missed or extra doses. Patient denies s/s bleeding/bruising/swelling/SOB  No blood in urine or stool. No dietary changes. No changes in medication/OTC agents/Herbals. No change in alcohol use or tobacco use. No change in activity level. Patient denies falls. No vomiting/diarrhea or acute illness. No Procedures scheduled in the future at this time. Assessment:   Lab Results   Component Value Date    INR 2.40 (H) 10/05/2023    INR 2.70 (H) 08/22/2023    INR 2.90 (H) 07/11/2023     INR therapeutic   Recent Labs     10/05/23  0726   INR 2.40*      Patient interview completed and discussed with pharmacist by Mary Castañeda PharmD Candidate          Plan:  Continue Coumadin 15mg MWF and 10mg TThSaS. Recheck INR in 6 week(s). Patient reminded to call the Anticoagulation Clinic with any signs or symptoms of bleeding or with any medication changes. Patient given instructions utilizing the teach back method. After visit summary printed and reviewed with patient. Discharged ambulatory in no apparent distress.      For Pharmacy Admin Tracking Only      Time Spent (min): 20

## 2023-11-16 ENCOUNTER — HOSPITAL ENCOUNTER (OUTPATIENT)
Dept: PHARMACY | Age: 58
Setting detail: THERAPIES SERIES
Discharge: HOME | End: 2023-11-16
Payer: COMMERCIAL

## 2023-11-16 DIAGNOSIS — Z79.01 ANTICOAGULATED ON COUMADIN: Primary | ICD-10-CM

## 2023-11-16 DIAGNOSIS — I82.409 DEEP VEIN THROMBOSIS (DVT) OF LOWER EXTREMITY, UNSPECIFIED CHRONICITY, UNSPECIFIED LATERALITY, UNSPECIFIED VEIN (HCC): ICD-10-CM

## 2023-11-16 DIAGNOSIS — Z51.81 ENCOUNTER FOR THERAPEUTIC DRUG MONITORING: ICD-10-CM

## 2023-11-16 DIAGNOSIS — I26.99 PULMONARY EMBOLISM, UNSPECIFIED CHRONICITY, UNSPECIFIED PULMONARY EMBOLISM TYPE, UNSPECIFIED WHETHER ACUTE COR PULMONALE PRESENT (HCC): ICD-10-CM

## 2023-11-16 LAB — POC INR: 2.3 (ref 0.8–1.2)

## 2023-11-16 PROCEDURE — 85610 PROTHROMBIN TIME: CPT | Performed by: PHARMACIST

## 2023-11-16 PROCEDURE — 36416 COLLJ CAPILLARY BLOOD SPEC: CPT | Performed by: PHARMACIST

## 2023-11-16 PROCEDURE — 99211 OFF/OP EST MAY X REQ PHY/QHP: CPT | Performed by: PHARMACIST

## 2023-11-16 NOTE — PROGRESS NOTES
Medication Management [address] [place] [address]  [phone] (phone)  [phone] (fax)    Ms. Erika Plasencia is a 62 y.o.  female with history of DVT, PE who presents today for anticoagulation monitoring and adjustment. Patient verifies current dosing regimen and tablet strength. No missed or extra doses. Patient denies s/s bleeding/bruising/swelling/SOB  No blood in urine or stool. No dietary changes. No changes in medication/OTC agents/Herbals. No change in alcohol use or tobacco use. No change in activity level. Patient denies falls. No vomiting/diarrhea or acute illness. No Procedures scheduled in the future at this time. Assessment:   Goal 2-3    Lab Results   Component Value Date    INR 2.30 (H) 11/16/2023    INR 2.40 (H) 10/05/2023    INR 2.70 (H) 08/22/2023     INR therapeutic   Recent Labs     11/16/23  0817   INR 2.30*       Patient interview completed and discussed with pharmacist by Mariela Lal, PharmD candidate. Plan:  Continue Coumadin 15mg MWF and 10mg TThSaS. Recheck INR in 6 week(s). Patient reminded to call the Anticoagulation Clinic with any signs or symptoms of bleeding or with any medication changes. Patient given instructions utilizing the teach back method. After visit summary printed and reviewed with patient. Discharged ambulatory in no apparent distress.     For Pharmacy Admin Tracking Only    Time Spent (min): 20

## 2023-12-28 ENCOUNTER — HOSPITAL ENCOUNTER (OUTPATIENT)
Dept: PHARMACY | Age: 58
Setting detail: THERAPIES SERIES
Discharge: HOME | End: 2023-12-28
Payer: COMMERCIAL

## 2023-12-28 DIAGNOSIS — Z51.81 ENCOUNTER FOR THERAPEUTIC DRUG MONITORING: ICD-10-CM

## 2023-12-28 DIAGNOSIS — I26.99 PULMONARY EMBOLISM, UNSPECIFIED CHRONICITY, UNSPECIFIED PULMONARY EMBOLISM TYPE, UNSPECIFIED WHETHER ACUTE COR PULMONALE PRESENT (HCC): Primary | ICD-10-CM

## 2023-12-28 DIAGNOSIS — Z79.01 ANTICOAGULATED ON COUMADIN: ICD-10-CM

## 2023-12-28 DIAGNOSIS — I82.409 DEEP VEIN THROMBOSIS (DVT) OF LOWER EXTREMITY, UNSPECIFIED CHRONICITY, UNSPECIFIED LATERALITY, UNSPECIFIED VEIN (HCC): ICD-10-CM

## 2023-12-28 LAB — POC INR: 2.2 (ref 0.8–1.2)

## 2023-12-28 PROCEDURE — 85610 PROTHROMBIN TIME: CPT

## 2023-12-28 PROCEDURE — 99211 OFF/OP EST MAY X REQ PHY/QHP: CPT

## 2023-12-28 PROCEDURE — 36416 COLLJ CAPILLARY BLOOD SPEC: CPT

## 2023-12-28 RX ORDER — TIRZEPATIDE 5 MG/.5ML
INJECTION, SOLUTION SUBCUTANEOUS WEEKLY
COMMUNITY
Start: 2023-11-16

## 2023-12-28 NOTE — PROGRESS NOTES
Medication Management [address] [place] [address]  [phone] (phone)  [phone] (fax)    Ms. Darlynn Hodgkins is a 62 y.o.  female with history of DVT/recurrent PE, per referral from ANTONIA Guzman, who presents today for Warfarin monitoring and adjustment (6 week visit). Patient verifies current dosing regimen and tablet strength. No missed or extra doses. Patient denies bleeding/SOB. Has usual easy bruising. Has usual swelling of ankles. No blood in urine or stool. No dietary changes. No changes in medication/OTC agents/herbals. No change in alcohol use or tobacco use. Change in activity level: slightly decreased - holiday shutdown at work. Patient denies falls. No vomiting/diarrhea or acute illness. No procedures scheduled in the future at this time. Had flu and COVID vaccines 11/30. Assessment:   Lab Results   Component Value Date    INR 2.20 (H) 12/28/2023    INR 2.30 (H) 11/16/2023    INR 2.40 (H) 10/05/2023     INR therapeutic - goal 2-3. Recent Labs     12/28/23  0813   INR 2.20*        Plan:  POCT INR performed/result reviewed. Continue PO Coumadin 15 mg MWF, 10 mg TThSS. Recheck INR in 6 week(s). Patient reminded to call the Anticoagulation Clinic with any signs or symptoms of bleeding or with any medication changes. Patient given instructions utilizing the teach back method. After visit summary printed and reviewed with patient. Discharged ambulatory in no apparent distress.      For Pharmacy Admin Tracking Only    Time Spent (min): 20

## 2024-02-08 ENCOUNTER — HOSPITAL ENCOUNTER (OUTPATIENT)
Dept: PHARMACY | Age: 59
Setting detail: THERAPIES SERIES
Discharge: HOME | End: 2024-02-08
Payer: COMMERCIAL

## 2024-02-08 DIAGNOSIS — I26.99 PULMONARY EMBOLISM, UNSPECIFIED CHRONICITY, UNSPECIFIED PULMONARY EMBOLISM TYPE, UNSPECIFIED WHETHER ACUTE COR PULMONALE PRESENT (HCC): Primary | ICD-10-CM

## 2024-02-08 DIAGNOSIS — Z51.81 ENCOUNTER FOR THERAPEUTIC DRUG MONITORING: ICD-10-CM

## 2024-02-08 DIAGNOSIS — I82.409 DEEP VEIN THROMBOSIS (DVT) OF LOWER EXTREMITY, UNSPECIFIED CHRONICITY, UNSPECIFIED LATERALITY, UNSPECIFIED VEIN (HCC): ICD-10-CM

## 2024-02-08 DIAGNOSIS — Z79.01 ANTICOAGULATED ON COUMADIN: ICD-10-CM

## 2024-02-08 LAB — POC INR: 2.5 (ref 0.8–1.2)

## 2024-02-08 PROCEDURE — 99211 OFF/OP EST MAY X REQ PHY/QHP: CPT

## 2024-02-08 PROCEDURE — 36416 COLLJ CAPILLARY BLOOD SPEC: CPT

## 2024-02-08 PROCEDURE — 85610 PROTHROMBIN TIME: CPT

## 2024-02-08 NOTE — PROGRESS NOTES
Medication Management Clinic  [facility]  Anticoagulation Clinic  [phone] (phone)  [phone] (fax)    Ms. Aimee Perez is a 58 y.o.  female with history of DVT/recurrent PE, per referral from ANTONIA Guzman, who presents today for Warfarin monitoring and adjustment (6 week visit).    Patient verifies current dosing regimen and tablet strength.  No missed or extra doses.  Patient denies bleeding/swelling/SOB. States has bruise on leg from bumping it; mentioned bruises linger (even as a child).  No blood in urine or stool.  Dietary changes: less salad than usual past 2-3 weeks.  No changes in medication/OTC agents/herbals. May be starting Prozac.  Per DERRICK Randall RPh., PharmD. - has antiplatelet affects and could raise INR.  Should check INR 2 weeks after started.  (Relayed to patient.)  No change in alcohol use or tobacco use.  Change in activity level: gradually increasing.  Patient denies falls.  No vomiting/diarrhea or acute illness.   No procedures scheduled in the future at this time.    Assessment:   Lab Results   Component Value Date    INR 2.50 (H) 02/08/2024    INR 2.20 (H) 12/28/2023    INR 2.30 (H) 11/16/2023     INR therapeutic - goal 2-3.  Recent Labs     02/08/24  0818   INR 2.50*        Plan:  POCT INR performed/result reviewed.  Continue PO Coumadin 15 mg MWF, 10 mg TThSS.  Recheck INR in 6 week(s).  Patient reminded to call the Anticoagulation Clinic with any signs or symptoms of bleeding or with any medication changes.  Patient given instructions utilizing the teach back method.       After visit summary printed and reviewed with patient.      Discharged ambulatory in no apparent distress.    For Pharmacy Admin Tracking Only    Time Spent (min):  21

## 2024-03-21 ENCOUNTER — HOSPITAL ENCOUNTER (OUTPATIENT)
Dept: PHARMACY | Age: 59
Setting detail: THERAPIES SERIES
Discharge: HOME | End: 2024-03-21
Payer: COMMERCIAL

## 2024-03-21 DIAGNOSIS — Z79.01 ANTICOAGULATED ON COUMADIN: ICD-10-CM

## 2024-03-21 DIAGNOSIS — Z51.81 ENCOUNTER FOR THERAPEUTIC DRUG MONITORING: ICD-10-CM

## 2024-03-21 DIAGNOSIS — I26.99 PULMONARY EMBOLISM, UNSPECIFIED CHRONICITY, UNSPECIFIED PULMONARY EMBOLISM TYPE, UNSPECIFIED WHETHER ACUTE COR PULMONALE PRESENT (HCC): Primary | ICD-10-CM

## 2024-03-21 DIAGNOSIS — I82.409 DEEP VEIN THROMBOSIS (DVT) OF LOWER EXTREMITY, UNSPECIFIED CHRONICITY, UNSPECIFIED LATERALITY, UNSPECIFIED VEIN (HCC): ICD-10-CM

## 2024-03-21 LAB — POC INR: 3 (ref 0.8–1.2)

## 2024-03-21 PROCEDURE — 36416 COLLJ CAPILLARY BLOOD SPEC: CPT

## 2024-03-21 PROCEDURE — 99211 OFF/OP EST MAY X REQ PHY/QHP: CPT

## 2024-03-21 PROCEDURE — 85610 PROTHROMBIN TIME: CPT

## 2024-03-21 NOTE — PROGRESS NOTES
Medication Management Clinic  [facility]  Anticoagulation Clinic  [phone] (phone)  [phone] (fax)    Ms. Aimee Perez is a 59 y.o.  female with history of DVT/recurrent PE, per referral from ANTONIA Guzman, who presents today for Warfarin monitoring and adjustment (6 week visit).    Patient verifies current dosing regimen and tablet strength.  No missed or extra doses.  Patient denies bleeding/swelling/SOB. Has a nearly-faded bruise left lower leg from bumping it.  No blood in urine or stool.  No dietary changes.  Changes in medication/OTC agents/herbals: had tried Prozac for 2 weeks, but didn't like how she felt, so stopped it.  No change in alcohol use or tobacco use.  Change in activity level: walked more than usual last weekend - hopes to walk more regularly.  Patient denies falls.  No vomiting/diarrhea or acute illness.   No procedures scheduled in the future at this time.    Assessment:   Lab Results   Component Value Date    INR 3.00 (H) 03/21/2024    INR 2.50 (H) 02/08/2024    INR 2.20 (H) 12/28/2023     INR therapeutic - goal 2-3.  Recent Labs     03/21/24  0850   INR 3.00*        Plan:  POCT INR performed/result reviewed.  Continue PO Coumadin 15 mg MWF, 10 mg TThSS.  Recheck INR in 6 week(s).  Patient reminded to call the Anticoagulation Clinic with any signs or symptoms of bleeding or with any medication changes.  Patient given instructions utilizing the teach back method.       After visit summary printed and reviewed with patient.      Discharged ambulatory in no apparent distress, with granddaughter.     For Pharmacy Admin Tracking Only    Time Spent (min): 20

## 2024-03-26 ENCOUNTER — HOSPITAL ENCOUNTER (OUTPATIENT)
Age: 59
Discharge: HOME | End: 2024-03-26

## 2024-03-26 ENCOUNTER — HOSPITAL ENCOUNTER (OUTPATIENT)
Dept: GENERAL RADIOLOGY | Age: 59
Discharge: HOME | End: 2024-03-26

## 2024-03-26 DIAGNOSIS — S80.02XA CONTUSION OF LEFT KNEE, INITIAL ENCOUNTER: ICD-10-CM

## 2024-04-10 ENCOUNTER — HOSPITAL ENCOUNTER (OUTPATIENT)
Dept: MRI IMAGING | Age: 59
Discharge: HOME | End: 2024-04-10
Attending: EMERGENCY MEDICINE
Payer: COMMERCIAL

## 2024-04-10 DIAGNOSIS — S83.92XA SPRAIN OF LEFT KNEE, INITIAL ENCOUNTER: ICD-10-CM

## 2024-04-10 PROCEDURE — 73721 MRI JNT OF LWR EXTRE W/O DYE: CPT

## 2024-05-02 ENCOUNTER — HOSPITAL ENCOUNTER (OUTPATIENT)
Dept: PHARMACY | Age: 59
Setting detail: THERAPIES SERIES
Discharge: HOME | End: 2024-05-02
Payer: COMMERCIAL

## 2024-05-02 DIAGNOSIS — Z79.01 ANTICOAGULATED ON COUMADIN: ICD-10-CM

## 2024-05-02 DIAGNOSIS — I26.99 PULMONARY EMBOLISM, UNSPECIFIED CHRONICITY, UNSPECIFIED PULMONARY EMBOLISM TYPE, UNSPECIFIED WHETHER ACUTE COR PULMONALE PRESENT (HCC): Primary | ICD-10-CM

## 2024-05-02 DIAGNOSIS — Z51.81 ENCOUNTER FOR THERAPEUTIC DRUG MONITORING: ICD-10-CM

## 2024-05-02 DIAGNOSIS — I82.409 DEEP VEIN THROMBOSIS (DVT) OF LOWER EXTREMITY, UNSPECIFIED CHRONICITY, UNSPECIFIED LATERALITY, UNSPECIFIED VEIN (HCC): ICD-10-CM

## 2024-05-02 LAB — POC INR: 3.2 (ref 0.8–1.2)

## 2024-05-02 PROCEDURE — 36416 COLLJ CAPILLARY BLOOD SPEC: CPT

## 2024-05-02 PROCEDURE — 85610 PROTHROMBIN TIME: CPT

## 2024-05-02 PROCEDURE — 99211 OFF/OP EST MAY X REQ PHY/QHP: CPT

## 2024-05-02 NOTE — PROGRESS NOTES
Medication Management Clinic  [facility]  Anticoagulation Clinic  [phone] (phone)  [phone] (fax)    Ms. Aimee Perez is a 59 y.o.  female with history of DVT, PE who presents today for anticoagulation monitoring and adjustment.    Patient verifies current dosing regimen and tablet strength.  No missed or extra doses.  Patient denies s/s bleeding/bruising/swelling/SOB  No blood in urine or stool.  States has had less cabbage/greens for about the past 4 weeks, plans to add it back in  No changes in medication/OTC agents/Herbals.  No change in alcohol use or tobacco use.  No change in activity level.  Fell the end of March at work, bruised left knee. Did not hit head  No vomiting/diarrhea or acute illness.   No Procedures scheduled in the future at this time.    Assessment:   Lab Results   Component Value Date    INR 3.20 (H) 05/02/2024    INR 3.00 (H) 03/21/2024    INR 2.50 (H) 02/08/2024     INR supratherapeutic   Recent Labs     05/02/24  0842   INR 3.20*       Plan:  Take 5 mg today then continue Coumadin 15 mg MoWeFr and 10 mg SuTuThSa.  Recheck INR in 6 week(s).  Patient reminded to call the Anticoagulation Clinic with any signs or symptoms of bleeding or with any medication changes.  Patient given instructions utilizing the teach back method.    After visit summary printed and reviewed with patient.      Discharged ambulatory in no apparent distress.    For Pharmacy Admin Tracking Only  Intervention Detail: Dose Adjustment: 1, reason: Therapy Optimization  Total # of Interventions Recommended: 1  Total # of Interventions Accepted: 1  Time Spent (min): 15

## 2024-06-13 ENCOUNTER — HOSPITAL ENCOUNTER (OUTPATIENT)
Dept: PHARMACY | Age: 59
Setting detail: THERAPIES SERIES
Discharge: HOME | End: 2024-06-13
Payer: COMMERCIAL

## 2024-06-13 DIAGNOSIS — Z51.81 ENCOUNTER FOR THERAPEUTIC DRUG MONITORING: ICD-10-CM

## 2024-06-13 DIAGNOSIS — I26.99 PULMONARY EMBOLISM, UNSPECIFIED CHRONICITY, UNSPECIFIED PULMONARY EMBOLISM TYPE, UNSPECIFIED WHETHER ACUTE COR PULMONALE PRESENT (HCC): Primary | ICD-10-CM

## 2024-06-13 DIAGNOSIS — Z79.01 ANTICOAGULATED ON COUMADIN: ICD-10-CM

## 2024-06-13 DIAGNOSIS — I82.409 DEEP VEIN THROMBOSIS (DVT) OF LOWER EXTREMITY, UNSPECIFIED CHRONICITY, UNSPECIFIED LATERALITY, UNSPECIFIED VEIN (HCC): ICD-10-CM

## 2024-06-13 LAB — POC INR: 2.3 (ref 0.8–1.2)

## 2024-06-13 PROCEDURE — 36416 COLLJ CAPILLARY BLOOD SPEC: CPT

## 2024-06-13 PROCEDURE — 99211 OFF/OP EST MAY X REQ PHY/QHP: CPT

## 2024-06-13 PROCEDURE — 85610 PROTHROMBIN TIME: CPT

## 2024-06-13 NOTE — PROGRESS NOTES
Medication Management Clinic  [facility]  Anticoagulation Clinic  [phone] (phone)  [phone] (fax)    Ms. Aimee Perez is a 59 y.o.  female with history of DVT/recurrent PE, per referral from ANTONIA Guzman, who presents today for Warfarin monitoring and adjustment (6 week visit).    Patient verifies current dosing regimen and tablet strength.  No missed or extra doses.  Patient denies bleeding/bruising/SOB.  Has usual swelling of legs/ankles (old fractures bilat.); on feet 12 hours/day at work.  No blood in urine or stool.  No dietary changes.  No changes in medication/OTC agents/herbals.  No change in alcohol use or tobacco use.  No change in activity level.  Patient denies falls.  No vomiting/diarrhea or acute illness.   No procedures scheduled in the future at this time.    Assessment:   Lab Results   Component Value Date    INR 2.30 (H) 06/13/2024    INR 3.20 (H) 05/02/2024    INR 3.00 (H) 03/21/2024     INR therapeutic - goal 2-3.  Recent Labs     06/13/24  0812   INR 2.30*        Plan:  POCT INR performed/result reviewed.  Continue PO Coumadin 15 mg MWF, 10 mg TThSS.  Recheck INR in 6 week(s).  Patient reminded to call the Anticoagulation Clinic with any signs or symptoms of bleeding or with any medication changes.  Patient given instructions utilizing the teach back method.       After visit summary printed and reviewed with patient.      Discharged ambulatory in no apparent distress.    For Pharmacy Admin Tracking Only    Time Spent (min): 20

## 2024-07-12 RX ORDER — WARFARIN SODIUM 5 MG/1
TABLET ORAL
Qty: 220 TABLET | Refills: 3 | Status: SHIPPED | OUTPATIENT
Start: 2024-07-12

## 2024-07-25 ENCOUNTER — HOSPITAL ENCOUNTER (OUTPATIENT)
Dept: PHARMACY | Age: 59
Setting detail: THERAPIES SERIES
Discharge: HOME | End: 2024-07-25
Payer: COMMERCIAL

## 2024-07-25 DIAGNOSIS — Z79.01 ANTICOAGULATED ON COUMADIN: ICD-10-CM

## 2024-07-25 DIAGNOSIS — I82.409 DEEP VEIN THROMBOSIS (DVT) OF LOWER EXTREMITY, UNSPECIFIED CHRONICITY, UNSPECIFIED LATERALITY, UNSPECIFIED VEIN (HCC): ICD-10-CM

## 2024-07-25 DIAGNOSIS — Z51.81 ENCOUNTER FOR THERAPEUTIC DRUG MONITORING: ICD-10-CM

## 2024-07-25 DIAGNOSIS — I26.99 PULMONARY EMBOLISM, UNSPECIFIED CHRONICITY, UNSPECIFIED PULMONARY EMBOLISM TYPE, UNSPECIFIED WHETHER ACUTE COR PULMONALE PRESENT (HCC): Primary | ICD-10-CM

## 2024-07-25 LAB — POC INR: 2.5 (ref 0.8–1.2)

## 2024-07-25 PROCEDURE — 99211 OFF/OP EST MAY X REQ PHY/QHP: CPT

## 2024-07-25 PROCEDURE — 36416 COLLJ CAPILLARY BLOOD SPEC: CPT

## 2024-07-25 PROCEDURE — 85610 PROTHROMBIN TIME: CPT

## 2024-07-25 NOTE — PROGRESS NOTES
Medication Management Clinic  [facility]  Anticoagulation Clinic  [phone] (phone)  [phone] (fax)    Ms. Aimee Perez is a 59 y.o.  female with history of DVT, PE who presents today for anticoagulation monitoring and adjustment.    Patient verifies current dosing regimen and tablet strength.  No missed or extra doses.  Patient denies s/s bleeding/bruising/swelling/SOB  No blood in urine or stool.  States she has had less cabbage than usual since last appointment  No changes in medication/OTC agents/Herbals.  No change in alcohol use or tobacco use.  No change in activity level.  Patient denies falls.  No vomiting/diarrhea or acute illness.   No Procedures scheduled in the future at this time.    Assessment:   Lab Results   Component Value Date    INR 2.50 (H) 07/25/2024    INR 2.30 (H) 06/13/2024    INR 3.20 (H) 05/02/2024     INR therapeutic   Recent Labs     07/25/24  0810   INR 2.50*     Plan:  Continue Coumadin 15 mg MoWeFr and 10 mg SuTuThSa.  Recheck INR in 6 week(s).  Patient reminded to call the Anticoagulation Clinic with any signs or symptoms of bleeding or with any medication changes.  Patient given instructions utilizing the teach back method.    After visit summary printed and reviewed with patient.      Discharged ambulatory in no apparent distress.    For Pharmacy Admin Tracking Only  Time Spent (min): 15

## 2024-07-30 ENCOUNTER — APPOINTMENT (OUTPATIENT)
Dept: GENERAL RADIOLOGY | Age: 59
End: 2024-07-30
Payer: COMMERCIAL

## 2024-07-30 ENCOUNTER — HOSPITAL ENCOUNTER (EMERGENCY)
Age: 59
Discharge: HOME | End: 2024-07-30
Payer: COMMERCIAL

## 2024-07-30 VITALS
SYSTOLIC BLOOD PRESSURE: 125 MMHG | OXYGEN SATURATION: 100 % | HEART RATE: 60 BPM | DIASTOLIC BLOOD PRESSURE: 88 MMHG | RESPIRATION RATE: 18 BRPM | BODY MASS INDEX: 36.19 KG/M2 | TEMPERATURE: 98.2 F | HEIGHT: 64 IN | WEIGHT: 212 LBS

## 2024-07-30 DIAGNOSIS — R06.00 DYSPNEA, UNSPECIFIED TYPE: Primary | ICD-10-CM

## 2024-07-30 LAB
ANION GAP SERPL CALC-SCNC: 14 MEQ/L (ref 8–16)
BASOPHILS ABSOLUTE: 0 THOU/MM3 (ref 0–0.1)
BASOPHILS NFR BLD AUTO: 0.4 %
BUN SERPL-MCNC: 18 MG/DL (ref 7–22)
CALCIUM SERPL-MCNC: 9.3 MG/DL (ref 8.5–10.5)
CHLORIDE SERPL-SCNC: 104 MEQ/L (ref 98–111)
CO2 SERPL-SCNC: 23 MEQ/L (ref 23–33)
CREAT SERPL-MCNC: 0.9 MG/DL (ref 0.4–1.2)
DEPRECATED RDW RBC AUTO: 50.2 FL (ref 35–45)
EKG ATRIAL RATE: 69 BPM
EKG P AXIS: 49 DEGREES
EKG P-R INTERVAL: 140 MS
EKG Q-T INTERVAL: 396 MS
EKG QRS DURATION: 92 MS
EKG QTC CALCULATION (BAZETT): 424 MS
EKG R AXIS: 13 DEGREES
EKG T AXIS: 32 DEGREES
EKG VENTRICULAR RATE: 69 BPM
EOSINOPHIL NFR BLD AUTO: 0.8 %
EOSINOPHILS ABSOLUTE: 0 THOU/MM3 (ref 0–0.4)
ERYTHROCYTE [DISTWIDTH] IN BLOOD BY AUTOMATED COUNT: 15.4 % (ref 11.5–14.5)
GFR SERPL CREATININE-BSD FRML MDRD: 73 ML/MIN/1.73M2
GLUCOSE SERPL-MCNC: 88 MG/DL (ref 70–108)
HCT VFR BLD AUTO: 38 % (ref 37–47)
HGB BLD-MCNC: 12.5 GM/DL (ref 12–16)
IMM GRANULOCYTES # BLD AUTO: 0.01 THOU/MM3 (ref 0–0.07)
IMM GRANULOCYTES NFR BLD AUTO: 0.2 %
INR PPP: 2.55 (ref 0.85–1.13)
LYMPHOCYTES ABSOLUTE: 2.2 THOU/MM3 (ref 1–4.8)
LYMPHOCYTES NFR BLD AUTO: 41.1 %
MCH RBC QN AUTO: 29.2 PG (ref 26–33)
MCHC RBC AUTO-ENTMCNC: 32.9 GM/DL (ref 32.2–35.5)
MCV RBC AUTO: 88.8 FL (ref 81–99)
MONOCYTES ABSOLUTE: 0.4 THOU/MM3 (ref 0.4–1.3)
MONOCYTES NFR BLD AUTO: 6.8 %
NEUTROPHILS ABSOLUTE: 2.7 THOU/MM3 (ref 1.8–7.7)
NEUTROPHILS NFR BLD AUTO: 50.7 %
NRBC BLD AUTO-RTO: 0 /100 WBC
OSMOLALITY SERPL CALC.SUM OF ELEC: 282.6 MOSMOL/KG (ref 275–300)
PLATELET # BLD AUTO: 321 THOU/MM3 (ref 130–400)
PMV BLD AUTO: 10.8 FL (ref 9.4–12.4)
POTASSIUM SERPL-SCNC: 4.1 MEQ/L (ref 3.5–5.2)
RBC # BLD AUTO: 4.28 MILL/MM3 (ref 4.2–5.4)
SODIUM SERPL-SCNC: 141 MEQ/L (ref 135–145)
WBC # BLD AUTO: 5.3 THOU/MM3 (ref 4.8–10.8)

## 2024-07-30 PROCEDURE — 93010 ELECTROCARDIOGRAM REPORT: CPT | Performed by: INTERNAL MEDICINE

## 2024-07-30 PROCEDURE — 80048 BASIC METABOLIC PNL TOTAL CA: CPT

## 2024-07-30 PROCEDURE — 85610 PROTHROMBIN TIME: CPT

## 2024-07-30 PROCEDURE — 93005 ELECTROCARDIOGRAM TRACING: CPT | Performed by: NURSE PRACTITIONER

## 2024-07-30 PROCEDURE — 71046 X-RAY EXAM CHEST 2 VIEWS: CPT

## 2024-07-30 PROCEDURE — 36415 COLL VENOUS BLD VENIPUNCTURE: CPT

## 2024-07-30 PROCEDURE — 85025 COMPLETE CBC W/AUTO DIFF WBC: CPT

## 2024-07-30 PROCEDURE — 99285 EMERGENCY DEPT VISIT HI MDM: CPT

## 2024-07-30 NOTE — DISCHARGE INSTRUCTIONS
Return if you note worsening shortness of breath or your heart is racing.  Follow-up closely with your PCP and return for new or worsening symptoms

## 2024-07-30 NOTE — ED TRIAGE NOTES
Pt presents to the ED through lobby with c/o shortness of breath. States she was standing at work when SOB first occurred. States it has since gotten better. SOB is better at rest. Reports history of blood clots in her legs and lungs. She is currently on blood thinners. Denies chest pain

## 2024-07-30 NOTE — ED PROVIDER NOTES
METABOLIC PANEL   ANION GAP   OSMOLALITY   GLOMERULAR FILTRATION RATE, ESTIMATED       (Any cultures that may have been sent were not resulted at the time of this patient visit)    MEDICAL DECISION MAKING / ED COURSE:     Summary of Patient Presentation:      Plan:labs, CXR    1) Number and Complexity of Problems                    Differential Diagnosis includes (but not limited to):  PNA, PE, bronchitis, heat exhaustion                   Pertinent Comorbid Conditions:    Reviewed per the chart    2)  Data Reviewed (none if left blank, additional information can be found in the ED course)          My Independent interpretations:     EKG:         Normal sinus rhythm with sinus arrhythmia  Incomplete right bundle branch block  Borderline ECG  When compared with ECG of 27-JUN-2023 20:26,  No significant change was found  Confirmed by LIAD COUCH ([id number]) on 7/30/2024 6:30:22 PM          Imaging: negative CXR    Labs:      reassuring.  INR is therapeutic                   Decision Rules/Clinical Scores utilized:                        Previous visit summary and patient history available on EMR and was reviewed.     History obtained from chart review and the patient.     Pertinent previous records reviewed:  previous notes, admissions and hospitalizations .    Code Status: Not addressed at time of initial evaluation             See Formal Diagnostic Results above for the lab and radiology tests and orders.    ED Course as of 08/01/24 0446   Tue Jul 30, 2024   1830 Shared decision making with the patient.  Offered CTA and further evaluation to rule out PE but patient is feeling better and her INR is therapeutic so she is comfortable with not having any further imaging [KJ]      ED Course User Index  [KJ] Felicity Ramirez, APRN - CNP       3)  Treatment and Disposition         ED Reassessment/Response to interventions:  stable.  Offered to do CTA to r/o PE and patient declined.  Shared decision making used.      NA

## 2024-08-22 ENCOUNTER — TELEPHONE (OUTPATIENT)
Age: 59
End: 2024-08-22

## 2024-08-22 NOTE — TELEPHONE ENCOUNTER
Left message on nurse's voicemail at PCP's office asking for referral renewal to be returned signed that was faxed 8/14; current referral expires 8/30.

## 2024-08-28 NOTE — TELEPHONE ENCOUNTER
Called PCP's office for referral renewal; current referral expires in a few days - cannot see patient on  referral. She asked that it be re-faxed to [phone], attn. Rahel Herron.  Done per RMA.

## 2024-09-05 ENCOUNTER — ANTI-COAG VISIT (OUTPATIENT)
Age: 59
End: 2024-09-05
Payer: COMMERCIAL

## 2024-09-05 ENCOUNTER — OFFICE VISIT (OUTPATIENT)
Dept: CARDIOLOGY CLINIC | Age: 59
End: 2024-09-05
Payer: COMMERCIAL

## 2024-09-05 VITALS
DIASTOLIC BLOOD PRESSURE: 82 MMHG | WEIGHT: 212.4 LBS | SYSTOLIC BLOOD PRESSURE: 125 MMHG | HEIGHT: 64 IN | BODY MASS INDEX: 36.26 KG/M2 | HEART RATE: 73 BPM

## 2024-09-05 DIAGNOSIS — R93.1 ABNORMAL ECHOCARDIOGRAM: ICD-10-CM

## 2024-09-05 DIAGNOSIS — Z79.01 ANTICOAGULATED ON COUMADIN: ICD-10-CM

## 2024-09-05 DIAGNOSIS — E66.09 CLASS 2 OBESITY DUE TO EXCESS CALORIES WITH BODY MASS INDEX (BMI) OF 35.0 TO 35.9 IN ADULT, UNSPECIFIED WHETHER SERIOUS COMORBIDITY PRESENT: ICD-10-CM

## 2024-09-05 DIAGNOSIS — E78.5 DYSLIPIDEMIA: ICD-10-CM

## 2024-09-05 DIAGNOSIS — R94.31 ABNORMAL EKG: ICD-10-CM

## 2024-09-05 DIAGNOSIS — Z51.81 ENCOUNTER FOR THERAPEUTIC DRUG MONITORING: ICD-10-CM

## 2024-09-05 DIAGNOSIS — I82.409 DEEP VEIN THROMBOSIS (DVT) OF LOWER EXTREMITY, UNSPECIFIED CHRONICITY, UNSPECIFIED LATERALITY, UNSPECIFIED VEIN (HCC): ICD-10-CM

## 2024-09-05 DIAGNOSIS — I26.99 PULMONARY EMBOLISM, UNSPECIFIED CHRONICITY, UNSPECIFIED PULMONARY EMBOLISM TYPE, UNSPECIFIED WHETHER ACUTE COR PULMONALE PRESENT (HCC): Primary | ICD-10-CM

## 2024-09-05 DIAGNOSIS — R00.1 SINUS BRADYCARDIA: Primary | ICD-10-CM

## 2024-09-05 LAB — POC INR: 2.1 (ref 0.8–1.2)

## 2024-09-05 PROCEDURE — 99211 OFF/OP EST MAY X REQ PHY/QHP: CPT

## 2024-09-05 PROCEDURE — 99214 OFFICE O/P EST MOD 30 MIN: CPT | Performed by: INTERNAL MEDICINE

## 2024-09-05 PROCEDURE — 85610 PROTHROMBIN TIME: CPT

## 2024-09-05 NOTE — PROGRESS NOTES
Medication Management Clinic  [facility]  Anticoagulation Clinic  [phone] (phone)  [phone] (fax)    Ms. Aimee Perez is a 59 y.o.  female with history of DVT/recurrent PE, per referral from ANTONIA Guzman, who presents today for Warfarin monitoring and adjustment (6 week visit).    Patient verifies current dosing regimen and tablet strength.  No missed or extra doses.  Patient denies bleeding/SOB. Has usual swelling of ankles.  Has usual easy bruising.  No blood in urine or stool.  No dietary changes.  No changes in medication/OTC agents/herbals.  No change in alcohol use or tobacco use.  No change in activity level. Has more stress.  Patient denies falls.  No vomiting/diarrhea or acute illness.   No procedures scheduled in the future at this time.  Went to ER 7/30 for dyspnea - resolved without treatment.  INR there 2.55.    Assessment:     Lab Results   Component Value Date    INR 2.10 (H) 09/05/2024    INR 2.55 (H) 07/30/2024    INR 2.50 (H) 07/25/2024     INR therapeutic - goal 2-3.  Recent Labs     09/05/24  0918   INR 2.10*      Plan:  POCT INR performed/result reviewed.  Continue PO Coumadin 15 mg MWF, 10 mg TThSS.  Recheck INR in 6 week(s).  Patient reminded to call the Anticoagulation Clinic with any signs or symptoms of bleeding or with any medication changes.  Patient given instructions utilizing the teach back method.       After visit summary printed and reviewed with patient.      Discharged ambulatory in no apparent distress.  Sees cardiologist next.     For Pharmacy Admin Tracking Only    Time Spent (min): 20

## 2024-09-05 NOTE — PROGRESS NOTES
Chief Complaint   Patient presents with    Follow-up     1 year follow up.   .    Originally Was bath room felt CP and later she found herself on the floor and brought to the hospital and found to have PE and troponin elevation  Was seen by Dr. Melara in the hospital      1 year follow up.    Last EKG done on 07/30/2024.    Reports palpitations 3 months ago, but nothing since then.      Denies chest pain, sob, dizziness, edema, and palpitations    Off lipitor by pcp for spasm/myalgia    Used to be on cpap for TERESA used once in a while  Now off cpap and now on oral appliance and confirmed to work for her during  Repeat sleep study with appliance    Had Hx of syncope sept 2013 while getting off toilate and Dx with PE    Dec 2009 had PE and sept 2013- PE      Patient Active Problem List   Diagnosis    Hx of Recurrent  pulmonary embolism dec 2009 and 09/2013- need to cont life long coumadin    hx of Syncope and collapse due to, PE    Obesity    Abnormal echocardiogram EF 55%, RVE and RV function reduced post PE    SOB (shortness of breath) on exertion- improving    Pulmonary embolus (HCC)    Deep vein thrombosis (HCC)    [place] Foxcroft filter in place    Anticoagulated on Coumadin    Sinus bradycardia -  and pauses only in the night range from 2.1 to 3.7 sec pause-pat Dxed with TERESA 2013 and cpap- Holter monitor after CPAP got better    TERESA (obstructive sleep apnea)    Plantar fasciitis, right    Asthma    Type 2 diabetes mellitus (HCC)    Abnormal EKG    Diverticulitis of large intestine    Dyslipidemia    Degeneration of cervical intervertebral disc    Encounter for therapeutic drug monitoring       Past Surgical History:   Procedure Laterality Date    ANKLE SURGERY      Left Ankle. plate    BACK SURGERY  03/2022    DILATION AND CURETTAGE OF UTERUS N/A 8/15/2019    HYSTEROSCOPY DILATATION AND CURETTAGE performed by Génesis Dwyer DO at [facility] OR    OTHER SURGICAL HISTORY      right wrist for tendonitis    TOE SURGERY

## 2024-09-10 ENCOUNTER — TELEPHONE (OUTPATIENT)
Dept: CARDIOLOGY CLINIC | Age: 59
End: 2024-09-10

## 2024-09-18 ENCOUNTER — TELEPHONE (OUTPATIENT)
Age: 59
End: 2024-09-18

## 2024-10-05 ENCOUNTER — HOSPITAL ENCOUNTER (EMERGENCY)
Age: 59
Discharge: HOME | End: 2024-10-05
Payer: COMMERCIAL

## 2024-10-05 VITALS
HEART RATE: 63 BPM | RESPIRATION RATE: 18 BRPM | TEMPERATURE: 98.1 F | WEIGHT: 211 LBS | BODY MASS INDEX: 36.22 KG/M2 | OXYGEN SATURATION: 100 % | DIASTOLIC BLOOD PRESSURE: 68 MMHG | SYSTOLIC BLOOD PRESSURE: 108 MMHG

## 2024-10-05 DIAGNOSIS — M54.6 PAIN IN THORACIC SPINE: Primary | ICD-10-CM

## 2024-10-05 PROCEDURE — 96372 THER/PROPH/DIAG INJ SC/IM: CPT

## 2024-10-05 PROCEDURE — 6360000002 HC RX W HCPCS: Performed by: NURSE PRACTITIONER

## 2024-10-05 PROCEDURE — 99213 OFFICE O/P EST LOW 20 MIN: CPT

## 2024-10-05 PROCEDURE — 99213 OFFICE O/P EST LOW 20 MIN: CPT | Performed by: NURSE PRACTITIONER

## 2024-10-05 RX ORDER — METHYLPREDNISOLONE ACETATE 80 MG/ML
80 INJECTION, SUSPENSION INTRA-ARTICULAR; INTRALESIONAL; INTRAMUSCULAR; SOFT TISSUE ONCE
Status: COMPLETED | OUTPATIENT
Start: 2024-10-05 | End: 2024-10-05

## 2024-10-05 RX ORDER — PREDNISONE 20 MG/1
40 TABLET ORAL DAILY
Qty: 10 TABLET | Refills: 0 | Status: SHIPPED | OUTPATIENT
Start: 2024-10-05 | End: 2024-10-10

## 2024-10-05 RX ADMIN — METHYLPREDNISOLONE ACETATE 80 MG: 80 INJECTION, SUSPENSION INTRA-ARTICULAR; INTRALESIONAL; INTRAMUSCULAR; SOFT TISSUE at 15:55

## 2024-10-05 ASSESSMENT — ENCOUNTER SYMPTOMS
CHEST TIGHTNESS: 0
BACK PAIN: 1
RHINORRHEA: 0
SHORTNESS OF BREATH: 0
NAUSEA: 0
VOMITING: 0
COUGH: 0
DIARRHEA: 0
SORE THROAT: 0

## 2024-10-05 ASSESSMENT — PAIN DESCRIPTION - ORIENTATION: ORIENTATION: MID

## 2024-10-05 ASSESSMENT — PAIN DESCRIPTION - LOCATION: LOCATION: BACK

## 2024-10-05 ASSESSMENT — PAIN SCALES - GENERAL: PAINLEVEL_OUTOF10: 1

## 2024-10-05 ASSESSMENT — PAIN - FUNCTIONAL ASSESSMENT: PAIN_FUNCTIONAL_ASSESSMENT: 0-10

## 2024-10-05 NOTE — ED TRIAGE NOTES
Pt to [facility] with c/o mid back pain that radiates across pt entire back. Pt reports being seen at the chiropractor and using a pain relief cream with no pain relief. She reports wanting to know if she has pneumonia. She reports having a cough but denies fevers, chest pain and SOB.

## 2024-10-05 NOTE — ED PROVIDER NOTES
[facility] URGENT CARE  Urgent Care Encounter       CHIEF COMPLAINT       Chief Complaint   Patient presents with    Back Pain       Nurses Notes reviewed and I agree except as noted in the HPI.  HISTORY OF PRESENT ILLNESS   Aimee Perez is a 59 y.o. female who presents to the [place] urgent care for evaluation of back pain.  Back pain is located in the thoracic area near the bilateral scapula.  She reports that she has seen a chiropractor with minimal relief.  She reports that she has been using her Flexeril that was previously prescribed.  She also reports that she has been using lidocaine patches.  She reports that the pain is aggravated with movement, coughing, and taking a deep breath.  She reports that she is on Coumadin.  Denies saddle paresthesia or incontinence of bowel or bladder.    The history is provided by the patient. No  was used.       REVIEW OF SYSTEMS     Review of Systems   Constitutional:  Negative for activity change, appetite change, chills, fatigue and fever.   HENT:  Negative for ear discharge, ear pain, rhinorrhea and sore throat.    Respiratory:  Negative for cough, chest tightness and shortness of breath.    Cardiovascular:  Negative for chest pain.   Gastrointestinal:  Negative for diarrhea, nausea and vomiting.   Genitourinary:  Negative for dysuria.   Musculoskeletal:  Positive for back pain.   Skin:  Negative for rash.   Allergic/Immunologic: Negative for environmental allergies and food allergies.   Neurological:  Negative for dizziness and headaches.       PAST MEDICAL HISTORY         Diagnosis Date    Asthma     Depression     Diverticulitis     History of deep vein thrombophlebitis of lower extremity     Osteoarthritis 04/2020    Osteopenia     PE (pulmonary embolism)     x2    Plantar fasciitis, right 3/2016    Sleep apnea     tested Dec 2013 needs CPAP yet    Type 2 diabetes mellitus without complication (HCC) 04/25/2017  for 5 days       Discontinued Medications    No medications on file       Current Discharge Medication List          José Manuel Victoria, LUIZ - CNP    (Please note that portions of this note were completed with a voice recognition program. Efforts were made to edit the dictations but occasionally words are mis-transcribed.)            José Manuel Victoria, APRN - CNP  10/05/24 [id number]

## 2024-10-07 ENCOUNTER — TELEPHONE (OUTPATIENT)
Age: 59
End: 2024-10-07

## 2024-10-07 NOTE — TELEPHONE ENCOUNTER
Patient called and states that she went to Urgent Care for back pain,  they started her on Prednisone 2 tabs for 5 days 20 mg.  She also states that she is having surgery soon and needs to be off Coumadin for 5 days prior.  Please call the patient back with instructions.

## 2024-10-07 NOTE — TELEPHONE ENCOUNTER
Spoke to Aimee. Reports she started Prednisone 10/5/24. Instructed her to take Coumadin 7.5 mg today instead of her normal 15 mg on Mondays.  Informed patient she will be given Lovenox instructions at 10/17/24 visit.  Lovenox Rx will be sent at that appointment.  Pt voiced understanding.

## 2024-10-17 ENCOUNTER — ANTI-COAG VISIT (OUTPATIENT)
Age: 59
End: 2024-10-17
Payer: COMMERCIAL

## 2024-10-17 DIAGNOSIS — I82.409 DEEP VEIN THROMBOSIS (DVT) OF LOWER EXTREMITY, UNSPECIFIED CHRONICITY, UNSPECIFIED LATERALITY, UNSPECIFIED VEIN (HCC): ICD-10-CM

## 2024-10-17 DIAGNOSIS — I26.99 PULMONARY EMBOLISM, UNSPECIFIED CHRONICITY, UNSPECIFIED PULMONARY EMBOLISM TYPE, UNSPECIFIED WHETHER ACUTE COR PULMONALE PRESENT (HCC): Primary | ICD-10-CM

## 2024-10-17 DIAGNOSIS — Z79.01 ANTICOAGULATED ON COUMADIN: ICD-10-CM

## 2024-10-17 DIAGNOSIS — Z51.81 ENCOUNTER FOR THERAPEUTIC DRUG MONITORING: ICD-10-CM

## 2024-10-17 LAB — POC INR: 3.6 (ref 0.8–1.2)

## 2024-10-17 PROCEDURE — 99213 OFFICE O/P EST LOW 20 MIN: CPT | Performed by: PHARMACIST

## 2024-10-17 PROCEDURE — 85610 PROTHROMBIN TIME: CPT | Performed by: PHARMACIST

## 2024-10-17 RX ORDER — ENOXAPARIN SODIUM 100 MG/ML
100 INJECTION SUBCUTANEOUS 2 TIMES DAILY
Qty: 16 EACH | Refills: 0 | Status: SHIPPED | OUTPATIENT
Start: 2024-10-28

## 2024-10-17 NOTE — PATIENT INSTRUCTIONS
Medication Management Clinic  [facility]  Anticoagulation Clinic Bridge Instruction Sheet  Patient:   Aimee Perez                                                               YOB: 1965     Procedure: hysteroscopy dilation and curettage                     Date of Procedure: 11/1/24     Day Lovenox AM Lovenox PM Coumadin PM      10/27/24    None     None     None          10/28/24    100 mg     100 mg    None          10/29/24       100 mg    100 mg    None       10/30/24       100 mg     100 mg    None       10/31/24       100 mg    None     None      11/1/24  (Procedure)       None     None     None       11/2/24       None     100 mg     20 mg      11/3/24       100 mg    100 mg    15 mg      11/4/24       100 mg    100 mg    15 mg      11/5/24       100 mg     100 mg    10 mg      11/6/24       100 mg    100 mg    15 mg      INR to be checked:  11/7/24    Any questions please call [facility] Medication Management Anticoagulation Clinic at [phone]

## 2024-10-17 NOTE — PROGRESS NOTES
Medication Management Clinic  [facility]  Anticoagulation Clinic  [phone] (phone)  [phone] (fax)    Ms. Aimee Perez is a 59 y.o.  female with history of DVT, PE who presents today for anticoagulation monitoring and adjustment.    Patient verifies current dosing regimen and tablet strength.  No missed or extra doses.  Patient denies s/s bleeding/bruising/swelling/SOB  No blood in urine or stool.  Has not been eating greens like she had during the summer   Urgent Care visit for back pain, prescribed prednisone 20 mg X 5 days, only took 10/7-10/10 (4days)   Drank glass of wine last evening, normally avoids 2 days prior to INR  Less active due to back pain for past 2 weeks   Patient denies falls.  No vomiting/diarrhea or acute illness.   No Procedures scheduled in the future at this time. Procedure scheduled for 11/1/24    Assessment:   Lab Results   Component Value Date    INR 3.60 (H) 10/17/2024    INR 2.10 (H) 09/05/2024    INR 2.55 (H) 07/30/2024     INR supratherapeutic   Recent Labs     10/17/24  0830   INR 3.60*     Plan:  Hold Coumadin today, take Coumadin 7.5 mg tomorrow, then resume Coumadin 15 mg MWF, 10 mg TThSaSu.  Hold Coumadin 10/27- 11/1 for planned procedure 11/1/24.  Follow Lovenox bridge instructions. Recheck INR in 1 week post procedure.  Patient reminded to call the Anticoagulation Clinic with any signs or symptoms of bleeding or with any medication changes.  Patient given instructions utilizing the teach back method.      Medication Management Clinic  [facility]  Anticoagulation Clinic Bridge Instruction Sheet  Patient:   Aimee Perez                                                               YOB: 1965     Procedure: hysteroscopy dilation and curettage                     Date of Procedure: 11/1/24     Day Lovenox AM Lovenox PM Coumadin PM      10/27/24    None     None     None          10/28/24    100 mg     100 mg    None

## 2024-10-24 NOTE — PROGRESS NOTES
This RN spoke w/ Dr. Davies regarding the pt's cardiologist (Dr. Winston) classifying her as a low to moderate risk for surgery. [facility]. Davies.

## 2024-10-27 ENCOUNTER — PREP FOR PROCEDURE (OUTPATIENT)
Dept: OBGYN | Age: 59
End: 2024-10-27

## 2024-10-27 RX ORDER — SODIUM CHLORIDE 9 MG/ML
INJECTION, SOLUTION INTRAVENOUS PRN
Status: CANCELLED | OUTPATIENT
Start: 2024-10-27

## 2024-10-27 RX ORDER — SODIUM CHLORIDE 0.9 % (FLUSH) 0.9 %
5-40 SYRINGE (ML) INJECTION EVERY 12 HOURS SCHEDULED
Status: CANCELLED | OUTPATIENT
Start: 2024-10-27

## 2024-10-27 RX ORDER — SODIUM CHLORIDE, SODIUM LACTATE, POTASSIUM CHLORIDE, CALCIUM CHLORIDE 600; 310; 30; 20 MG/100ML; MG/100ML; MG/100ML; MG/100ML
INJECTION, SOLUTION INTRAVENOUS CONTINUOUS
Status: CANCELLED | OUTPATIENT
Start: 2024-10-27

## 2024-10-27 RX ORDER — SODIUM CHLORIDE 0.9 % (FLUSH) 0.9 %
5-40 SYRINGE (ML) INJECTION PRN
Status: CANCELLED | OUTPATIENT
Start: 2024-10-27

## 2024-10-27 RX ORDER — ONDANSETRON 2 MG/ML
8 INJECTION INTRAMUSCULAR; INTRAVENOUS EVERY 8 HOURS PRN
Status: CANCELLED | OUTPATIENT
Start: 2024-10-27

## 2024-10-27 NOTE — H&P
Aimee SANTIZO : 1965 (58 yo F) Acc No. [id number] DOS: 10/17/2024     Patient: Aimee SANTIZO  Account Number: [id number] Provider: Génesis Dwyer DO  : 1965   Age: 59 Y   Sex: Female Date: 10/17/2024  Phone: [phone]  Address: [facility] [address]  [place], [id number]    Subjective:  Chief Complaints:      D AND C 24. SEE GREGG  HPI:     Gynecologic history:          Patient presents today for Pre-Op for D and C on 2024. Pt is getting D and C due to PMB that has been going on for years that happens everyday. She states she is still having the bleeding everyday, varies on the flow but it's always and brown color never pink or red. US showed a thickened endometrium. Patient is also on long term anticoagulation.  ROS:       General / Constitutional:       Patient denies change in appetite, fever, weakness.      Respiratory:       Patient denies cough, shortness of breath, , chest pain.      Gastrointestinal:       Patient denies abdominal pain, constipation, diarrhea.      Psychiatric:       Patient denies anxiety, difficulty sleeping, mood disorder.       Medical History:       Arthritis; .      Asthma, unspecified,.      Cardiomegaly: per pt,.      Diabetes; .      Diverticulitis; .      h/o PTE: PE x 2,.      Mitral and aortic valve diseases, unspecified: per pt,.      Morbid obesity,.      Osteopenia,.      Postmenopausal Bleeding,.      Pulmonary embolism,.      Unspecified polyarthropathy or polyarthritis; multiple sites: osteoarthritis,.      Gyn History:       Menstrual history: LMP: 2007,  Age of Menarche: 12.   Birth control none.   Sexual activity not currently sexually active.   Sexually Transmitted Diseases (STDs) none.   Last pap smear date 2020, Negative pap.   Last mammogram date 2023, Normal, LM[facility].   Last Dexa Scan 2022, Osteopenia.   Last Colonoscopy 2022, Normal.   OB History:       Pregnancy History:  Total pregnancies:  4, Full-term

## 2024-10-29 RX ORDER — ACETAMINOPHEN 500 MG
1000 TABLET ORAL EVERY 6 HOURS PRN
COMMUNITY

## 2024-10-29 NOTE — PROGRESS NOTES
PAT call attempted, patient unavailable, left message to please call us back at your earliest convenience; [phone]

## 2024-10-29 NOTE — PROGRESS NOTES
NPO after midnight (may have 8 oz of water up to 2 hours before surgery)  Bring insurance info and drivers license  Wear comfortable clean clothing  Do not bring jewelry  Shower night before and morning of surgery with a liquid antibacterial soap  Bring list of medications with dosage and how often taken  Follow all instructions given by your physician   needed at discharge  You must have a responsible adult with you day of surgery and for 24 hours after surgery  Call [phone] for any questions

## 2024-10-29 NOTE — PROGRESS NOTES
In preparation for their surgical procedure above patient was screened for Obstructive Sleep Apnea (TERESA) using the STOP-Bang Questionnaire by the Pre-Admission Testing department.  This is a pre-surgical screening tool for patient safety and serves as a recommendation, this WILL NOT cause cancellation of surgery.    STOP-Bang Questionnaire  * Do you currently see a pulmonologist?  No     If yes STOP, do not complete.  Patient follows with DrHasmuhk     1.  Do you snore loudly (able to be heard in the next room)?      No    2.  Do you often feel tired or sleepy during the daytime?          No       3.  Has anyone ever told you that you stop breathing during your sleep?       No    4.  Do you have or are you being treated for high blood pressure?          Yes      5.  BMI more than 35?  BMI (Calculated): 35.8        Yes    6.  Age over 50 years? 59 y.o.      Yes    7.  Neck Circumference greater than 17 inches for male or 16 inches for female?  Measured           (visits only)            Not Applicable    8.  Gender Male?                 No      TOTAL SCORE: 3    TERESA - Low Risk : Yes to 0 - 2 questions  TERESA - Intermediate Risk : Yes to 3 - 4 questions  TERESA - High Risk : Yes to 5 - 8 questions    Adapted from:   STOP Questionnaire: A Tool to Screen Patients for Obstructive Sleep Apnea   MUKUND Alatorre.R.C.P.C., Elton Easton M.B.B.S., Juvenal Larose M.D., Holly Babin, Ph.D., ISMAEL Leal.B.B.S., ISMAEL Larson.Sc., Meryl Evans M.D., William Hadley F.R.C.P.C.   Anesthesiology 2008; 108:812-21 Copyright 2008, the American Society of Anesthesiologists, Inc. [employer]   ----------------------------------------------------------------------------------------------------------------

## 2024-11-01 ENCOUNTER — HOSPITAL ENCOUNTER (OUTPATIENT)
Age: 59
Setting detail: OUTPATIENT SURGERY
Discharge: HOME | End: 2024-11-01
Attending: OBSTETRICS & GYNECOLOGY | Admitting: OBSTETRICS & GYNECOLOGY
Payer: COMMERCIAL

## 2024-11-01 ENCOUNTER — ANESTHESIA EVENT (OUTPATIENT)
Dept: OPERATING ROOM | Age: 59
End: 2024-11-01
Payer: COMMERCIAL

## 2024-11-01 ENCOUNTER — ANESTHESIA (OUTPATIENT)
Dept: OPERATING ROOM | Age: 59
End: 2024-11-01
Payer: COMMERCIAL

## 2024-11-01 VITALS
BODY MASS INDEX: 35.96 KG/M2 | HEART RATE: 56 BPM | WEIGHT: 210.6 LBS | HEIGHT: 64 IN | SYSTOLIC BLOOD PRESSURE: 144 MMHG | TEMPERATURE: 97.5 F | RESPIRATION RATE: 11 BRPM | OXYGEN SATURATION: 99 % | DIASTOLIC BLOOD PRESSURE: 77 MMHG

## 2024-11-01 DIAGNOSIS — R93.89 THICKENED ENDOMETRIUM: ICD-10-CM

## 2024-11-01 DIAGNOSIS — N95.0 PMB (POSTMENOPAUSAL BLEEDING): ICD-10-CM

## 2024-11-01 LAB
GLUCOSE BLD STRIP.AUTO-MCNC: 99 MG/DL (ref 70–108)
POC INR: 1.1 (ref 0.8–1.2)

## 2024-11-01 PROCEDURE — 7100000000 HC PACU RECOVERY - FIRST 15 MIN: Performed by: OBSTETRICS & GYNECOLOGY

## 2024-11-01 PROCEDURE — 7100000001 HC PACU RECOVERY - ADDTL 15 MIN: Performed by: OBSTETRICS & GYNECOLOGY

## 2024-11-01 PROCEDURE — 6360000002 HC RX W HCPCS

## 2024-11-01 PROCEDURE — 88305 TISSUE EXAM BY PATHOLOGIST: CPT

## 2024-11-01 PROCEDURE — 6360000002 HC RX W HCPCS: Performed by: NURSE ANESTHETIST, CERTIFIED REGISTERED

## 2024-11-01 PROCEDURE — 82948 REAGENT STRIP/BLOOD GLUCOSE: CPT

## 2024-11-01 PROCEDURE — 3600000003 HC SURGERY LEVEL 3 BASE: Performed by: OBSTETRICS & GYNECOLOGY

## 2024-11-01 PROCEDURE — 7100000010 HC PHASE II RECOVERY - FIRST 15 MIN: Performed by: OBSTETRICS & GYNECOLOGY

## 2024-11-01 PROCEDURE — 2580000003 HC RX 258: Performed by: OBSTETRICS & GYNECOLOGY

## 2024-11-01 PROCEDURE — 7100000011 HC PHASE II RECOVERY - ADDTL 15 MIN: Performed by: OBSTETRICS & GYNECOLOGY

## 2024-11-01 PROCEDURE — 2720000010 HC SURG SUPPLY STERILE: Performed by: OBSTETRICS & GYNECOLOGY

## 2024-11-01 PROCEDURE — 2709999900 HC NON-CHARGEABLE SUPPLY: Performed by: OBSTETRICS & GYNECOLOGY

## 2024-11-01 PROCEDURE — 2500000003 HC RX 250 WO HCPCS: Performed by: NURSE ANESTHETIST, CERTIFIED REGISTERED

## 2024-11-01 PROCEDURE — 3700000001 HC ADD 15 MINUTES (ANESTHESIA): Performed by: OBSTETRICS & GYNECOLOGY

## 2024-11-01 PROCEDURE — 3700000000 HC ANESTHESIA ATTENDED CARE: Performed by: OBSTETRICS & GYNECOLOGY

## 2024-11-01 PROCEDURE — 3600000013 HC SURGERY LEVEL 3 ADDTL 15MIN: Performed by: OBSTETRICS & GYNECOLOGY

## 2024-11-01 PROCEDURE — 6370000000 HC RX 637 (ALT 250 FOR IP): Performed by: OBSTETRICS & GYNECOLOGY

## 2024-11-01 RX ORDER — ONDANSETRON 2 MG/ML
8 INJECTION INTRAMUSCULAR; INTRAVENOUS EVERY 8 HOURS PRN
Status: DISCONTINUED | OUTPATIENT
Start: 2024-11-01 | End: 2024-11-01 | Stop reason: HOSPADM

## 2024-11-01 RX ORDER — ONDANSETRON 2 MG/ML
4 INJECTION INTRAMUSCULAR; INTRAVENOUS EVERY 6 HOURS PRN
Status: DISCONTINUED | OUTPATIENT
Start: 2024-11-01 | End: 2024-11-01 | Stop reason: HOSPADM

## 2024-11-01 RX ORDER — OXYCODONE HYDROCHLORIDE 5 MG/1
5 TABLET ORAL EVERY 4 HOURS PRN
Status: DISCONTINUED | OUTPATIENT
Start: 2024-11-01 | End: 2024-11-01 | Stop reason: HOSPADM

## 2024-11-01 RX ORDER — ACETAMINOPHEN 500 MG
1000 TABLET ORAL EVERY 8 HOURS PRN
Status: DISCONTINUED | OUTPATIENT
Start: 2024-11-01 | End: 2024-11-01 | Stop reason: HOSPADM

## 2024-11-01 RX ORDER — SODIUM CHLORIDE 0.9 % (FLUSH) 0.9 %
5-40 SYRINGE (ML) INJECTION EVERY 12 HOURS SCHEDULED
Status: DISCONTINUED | OUTPATIENT
Start: 2024-11-01 | End: 2024-11-01 | Stop reason: HOSPADM

## 2024-11-01 RX ORDER — KETAMINE HYDROCHLORIDE 50 MG/ML
INJECTION, SOLUTION INTRAMUSCULAR; INTRAVENOUS
Status: DISCONTINUED | OUTPATIENT
Start: 2024-11-01 | End: 2024-11-01 | Stop reason: SDUPTHER

## 2024-11-01 RX ORDER — SODIUM CHLORIDE, SODIUM LACTATE, POTASSIUM CHLORIDE, CALCIUM CHLORIDE 600; 310; 30; 20 MG/100ML; MG/100ML; MG/100ML; MG/100ML
INJECTION, SOLUTION INTRAVENOUS CONTINUOUS
Status: DISCONTINUED | OUTPATIENT
Start: 2024-11-01 | End: 2024-11-01

## 2024-11-01 RX ORDER — SODIUM CHLORIDE 0.9 % (FLUSH) 0.9 %
5-40 SYRINGE (ML) INJECTION PRN
Status: DISCONTINUED | OUTPATIENT
Start: 2024-11-01 | End: 2024-11-01 | Stop reason: HOSPADM

## 2024-11-01 RX ORDER — FENTANYL CITRATE 50 UG/ML
50 INJECTION, SOLUTION INTRAMUSCULAR; INTRAVENOUS ONCE
Status: COMPLETED | OUTPATIENT
Start: 2024-11-01 | End: 2024-11-01

## 2024-11-01 RX ORDER — DEXAMETHASONE SODIUM PHOSPHATE 10 MG/ML
INJECTION, EMULSION INTRAMUSCULAR; INTRAVENOUS
Status: DISCONTINUED | OUTPATIENT
Start: 2024-11-01 | End: 2024-11-01 | Stop reason: SDUPTHER

## 2024-11-01 RX ORDER — KETOROLAC TROMETHAMINE 30 MG/ML
INJECTION, SOLUTION INTRAMUSCULAR; INTRAVENOUS
Status: COMPLETED
Start: 2024-11-01 | End: 2024-11-01

## 2024-11-01 RX ORDER — SODIUM CHLORIDE, SODIUM LACTATE, POTASSIUM CHLORIDE, CALCIUM CHLORIDE 600; 310; 30; 20 MG/100ML; MG/100ML; MG/100ML; MG/100ML
INJECTION, SOLUTION INTRAVENOUS SEE ADMIN INSTRUCTIONS
Status: DISCONTINUED | OUTPATIENT
Start: 2024-11-01 | End: 2024-11-01 | Stop reason: HOSPADM

## 2024-11-01 RX ORDER — SODIUM CHLORIDE 9 MG/ML
INJECTION, SOLUTION INTRAVENOUS PRN
Status: DISCONTINUED | OUTPATIENT
Start: 2024-11-01 | End: 2024-11-01 | Stop reason: HOSPADM

## 2024-11-01 RX ORDER — ONDANSETRON 2 MG/ML
INJECTION INTRAMUSCULAR; INTRAVENOUS
Status: DISCONTINUED | OUTPATIENT
Start: 2024-11-01 | End: 2024-11-01 | Stop reason: SDUPTHER

## 2024-11-01 RX ORDER — LIDOCAINE HYDROCHLORIDE 20 MG/ML
INJECTION, SOLUTION INTRAVENOUS
Status: DISCONTINUED | OUTPATIENT
Start: 2024-11-01 | End: 2024-11-01 | Stop reason: SDUPTHER

## 2024-11-01 RX ORDER — MIDAZOLAM HYDROCHLORIDE 1 MG/ML
INJECTION, SOLUTION INTRAMUSCULAR; INTRAVENOUS
Status: DISCONTINUED | OUTPATIENT
Start: 2024-11-01 | End: 2024-11-01 | Stop reason: SDUPTHER

## 2024-11-01 RX ORDER — KETOROLAC TROMETHAMINE 30 MG/ML
15 INJECTION, SOLUTION INTRAMUSCULAR; INTRAVENOUS ONCE
Status: COMPLETED | OUTPATIENT
Start: 2024-11-01 | End: 2024-11-01

## 2024-11-01 RX ORDER — ONDANSETRON 4 MG/1
4 TABLET, ORALLY DISINTEGRATING ORAL EVERY 8 HOURS PRN
Status: DISCONTINUED | OUTPATIENT
Start: 2024-11-01 | End: 2024-11-01 | Stop reason: HOSPADM

## 2024-11-01 RX ORDER — FENTANYL CITRATE 50 UG/ML
INJECTION, SOLUTION INTRAMUSCULAR; INTRAVENOUS
Status: COMPLETED
Start: 2024-11-01 | End: 2024-11-01

## 2024-11-01 RX ORDER — OXYCODONE HYDROCHLORIDE 5 MG/1
10 TABLET ORAL EVERY 4 HOURS PRN
Status: DISCONTINUED | OUTPATIENT
Start: 2024-11-01 | End: 2024-11-01 | Stop reason: HOSPADM

## 2024-11-01 RX ADMIN — ONDANSETRON 4 MG: 2 INJECTION INTRAMUSCULAR; INTRAVENOUS at 08:03

## 2024-11-01 RX ADMIN — KETOROLAC TROMETHAMINE 15 MG: 30 INJECTION, SOLUTION INTRAMUSCULAR at 08:29

## 2024-11-01 RX ADMIN — KETAMINE HYDROCHLORIDE 10 MG: 50 INJECTION, SOLUTION INTRAMUSCULAR; INTRAVENOUS at 07:39

## 2024-11-01 RX ADMIN — KETAMINE HYDROCHLORIDE 10 MG: 50 INJECTION, SOLUTION INTRAMUSCULAR; INTRAVENOUS at 07:32

## 2024-11-01 RX ADMIN — LIDOCAINE HYDROCHLORIDE 100 MG: 20 INJECTION, SOLUTION INTRAVENOUS at 07:50

## 2024-11-01 RX ADMIN — KETAMINE HYDROCHLORIDE 10 MG: 50 INJECTION, SOLUTION INTRAMUSCULAR; INTRAVENOUS at 07:42

## 2024-11-01 RX ADMIN — FENTANYL CITRATE 50 MCG: 50 INJECTION, SOLUTION INTRAMUSCULAR; INTRAVENOUS at 08:39

## 2024-11-01 RX ADMIN — KETOROLAC TROMETHAMINE 15 MG: 30 INJECTION, SOLUTION INTRAMUSCULAR; INTRAVENOUS at 08:29

## 2024-11-01 RX ADMIN — OXYCODONE HYDROCHLORIDE 5 MG: 5 TABLET ORAL at 09:15

## 2024-11-01 RX ADMIN — PROPOFOL 20 MG: 10 INJECTION, EMULSION INTRAVENOUS at 07:41

## 2024-11-01 RX ADMIN — DEXAMETHASONE SODIUM PHOSPHATE 5 MG: 10 INJECTION, EMULSION INTRAMUSCULAR; INTRAVENOUS at 08:03

## 2024-11-01 RX ADMIN — KETAMINE HYDROCHLORIDE 10 MG: 50 INJECTION, SOLUTION INTRAMUSCULAR; INTRAVENOUS at 07:37

## 2024-11-01 RX ADMIN — PROPOFOL 150 MG: 10 INJECTION, EMULSION INTRAVENOUS at 07:49

## 2024-11-01 RX ADMIN — MIDAZOLAM 2 MG: 1 INJECTION INTRAMUSCULAR; INTRAVENOUS at 07:41

## 2024-11-01 RX ADMIN — MIDAZOLAM 2 MG: 1 INJECTION INTRAMUSCULAR; INTRAVENOUS at 07:32

## 2024-11-01 RX ADMIN — SODIUM CHLORIDE: 9 INJECTION, SOLUTION INTRAVENOUS at 07:30

## 2024-11-01 RX ADMIN — KETAMINE HYDROCHLORIDE 10 MG: 50 INJECTION, SOLUTION INTRAMUSCULAR; INTRAVENOUS at 07:35

## 2024-11-01 ASSESSMENT — PAIN SCALES - GENERAL
PAINLEVEL_OUTOF10: 4
PAINLEVEL_OUTOF10: 2
PAINLEVEL_OUTOF10: 8
PAINLEVEL_OUTOF10: 7

## 2024-11-01 ASSESSMENT — PAIN DESCRIPTION - ORIENTATION: ORIENTATION: LOWER

## 2024-11-01 ASSESSMENT — PAIN DESCRIPTION - DESCRIPTORS: DESCRIPTORS: NAGGING

## 2024-11-01 ASSESSMENT — PAIN - FUNCTIONAL ASSESSMENT: PAIN_FUNCTIONAL_ASSESSMENT: 0-10

## 2024-11-01 ASSESSMENT — ENCOUNTER SYMPTOMS: SHORTNESS OF BREATH: 1

## 2024-11-01 ASSESSMENT — PAIN DESCRIPTION - LOCATION: LOCATION: PELVIS

## 2024-11-01 NOTE — DISCHARGE SUMMARY
Gynecology Discharge Summary        Pt Name: Aimee Perez  [id number]  YOB: 1965      Reasons for Admission on 11/1/2024  6:02 AM  PMB (postmenopausal bleeding) [N95.0]  Thickened endometrium [R93.89]  No comment available      PROCEDURE:  Hysteroscopy D&C    Operative Complications   Incidental uterine perforation    Pre Operative Labs    Lab Results   Component Value Date/Time    WBC 8.0 10/18/2024 11:02 AM    HGB 13.1 10/18/2024 11:02 AM    HCT 40.4 10/18/2024 11:02 AM     10/18/2024 11:02 AM       Discharge Diagnosis   Postmenopausal bleeding    Discharge Information  Current Discharge Medication List        CONTINUE these medications which have NOT CHANGED    Details   acetaminophen (TYLENOL) 500 MG tablet Take 2 tablets by mouth every 6 hours as needed for Pain      enoxaparin (LOVENOX) 100 MG/ML Inject 1 mL into the skin 2 times daily  Qty: 16 each, Refills: 0      hydrOXYzine HCl (ATARAX) 25 MG tablet Take 1 tablet by mouth every 8 hours as needed for Anxiety or Itching      magnesium oxide (MAG-OX) 400 MG tablet Take 1 tablet by mouth as needed      cyclobenzaprine (FLEXERIL) 10 MG tablet Take 1 tablet by mouth 3 times daily as needed for Muscle spasms      Caltrate 600+D Plus Minerals (CALTRATE) 600-800 MG-UNIT TABS tablet Take 2 tablets by mouth daily      montelukast (SINGULAIR) 10 MG tablet Take 1 tablet by mouth daily Indications: Condition caused by an Allergy      alendronate (FOSAMAX) 70 MG tablet Take 1 tablet by mouth every 28 days Indications: Osteopenia      lisinopril (PRINIVIL;ZESTRIL) 5 MG tablet Take 1 tablet by mouth daily Indications: Diabetes, Raised Blood Pressure      Albuterol Sulfate (VENTOLIN HFA IN) Inhale 2 puffs into the lungs every 4 hours as needed Indications: Asthma      warfarin (COUMADIN) 5 MG tablet TAKE DAILY AS DIRECTED BY [facility]  COUMADIN CLINIC  Qty: 220 tablet, Refills: 3    Comments: Please send a replace/new response

## 2024-11-01 NOTE — H&P
[facility]  History and Physical Update    Pt Name: Aimee Perez  MRN: [id number]  YOB: 1965  Date of evaluation: 11/1/2024    [x] I have examined the patient and reviewed the H&P/Consult and there are no changes to the patient or plans.    [] I have examined the patient and reviewed the H&P/Consult and have noted the following changes:        Génesis Dwyer DO,  Electronically signed 11/1/2024 at 7:26 AM

## 2024-11-01 NOTE — OP NOTE
Gyn Service    Operative Report      Pt Name: Aimee Perez  MRN: [id number] Acct #: [id number]  YOB: 1965  Procedure Performed By: Génesis Dwyer DO, D.O.        Pre-operative Diagnosis: postmenopausal bleeding    Post-operative Diagnosis:  SAME    Procedure:  Hysterosocopy with dilation and curettage    Surgeon:  Génesis Dwyer DO    Anesthesia:  General    Estimated blood loss:  Less than 10 ml    Findings:  Non-enlarged uterine cavity with a small endometrial polyp    Complications:  NONE      Patient was taken to the operative room where general anesthesia was achieved without difficulty.  She was then placed in the dorsal lithotomy position and prepped and draped in the normal sterile fashion.  2 Corcoran retractors were placed in the vagina without difficulty and the anterior lip of the cervix was grasped with a single-tooth teneculum.  Cervix was noted to be stenotic.  Cervix was serially dilated with the use of sandrita dilators.  The hysteroscope was introduced with lactated ringer's as a distending medium and findings as noted above.  An incidental uterine perforation was noted.  Hysteroscope was pulled back and endometrial cavity identified.  Small endometrial polyp noted and resected with the Aveta. Otherwise thin endometrium. Tissue was sent to pathology for permanent evaluation.   Hemostasis was assured.  Sponge, instrument and needle counts were correct.  The patient was awakened from anesthesia and taken to the recovery room in good condition.

## 2024-11-01 NOTE — ANESTHESIA POSTPROCEDURE EVALUATION
Department of Anesthesiology  Postprocedure Note    Patient: Aimee Perez  MRN: [id number]  YOB: 1965  Date of evaluation: 11/1/2024    Procedure Summary       Date: 11/01/24 Room / Location: [address]    Anesthesia Start: 0730 Anesthesia Stop: 0816    Procedure: Hysteroscopy Dilation and Curettage with Aveta (Vagina ) Diagnosis:       PMB (postmenopausal bleeding)      Thickened endometrium      (PMB (postmenopausal bleeding) [N95.0])      (Thickened endometrium [R93.89])    Surgeons: Génesis Dwyer DO Responsible Provider: Charlie Amin DO    Anesthesia Type: general ASA Status: 3            Anesthesia Type: No value filed.    Tano Phase I: Tano Score: 9    Tano Phase II:      Anesthesia Post Evaluation    Patient location during evaluation: PACU  Patient participation: complete - patient participated  Level of consciousness: awake  Airway patency: patent  Nausea & Vomiting: no nausea  Cardiovascular status: hemodynamically stable  Respiratory status: acceptable  Hydration status: stable  Pain management: adequate    No notable events documented.

## 2024-11-01 NOTE — ANESTHESIA PRE PROCEDURE
Department of Anesthesiology  Preprocedure Note       Name:  Aimee Perez   Age:  59 y.o.  :  1965                                          MRN:  [id number]         Date:  2024      Surgeon: Surgeon(s):  Génesis Dwyer DO    Procedure: Procedure(s):  Hysteroscopy Dilation and Curettage with Aveta    Medications prior to admission:   Prior to Admission medications    Medication Sig Start Date End Date Taking? Authorizing Provider   acetaminophen (TYLENOL) 500 MG tablet Take 2 tablets by mouth every 6 hours as needed for Pain   Yes Norberto Gibson MD   enoxaparin (LOVENOX) 100 MG/ML Inject 1 mL into the skin 2 times daily 10/28/24  Yes Julio Cesar Childers MD   hydrOXYzine HCl (ATARAX) 25 MG tablet Take 1 tablet by mouth every 8 hours as needed for Anxiety or Itching 23  Yes Norberto Gibson MD   magnesium oxide (MAG-OX) 400 MG tablet Take 1 tablet by mouth as needed   Yes Norberto Gibson MD   cyclobenzaprine (FLEXERIL) 10 MG tablet Take 1 tablet by mouth 3 times daily as needed for Muscle spasms   Yes Norberto Gibson MD   Caltrate 600+D Plus Minerals (CALTRATE) 600-800 MG-UNIT TABS tablet Take 2 tablets by mouth daily 21  Yes Norberto Gibson MD   montelukast (SINGULAIR) 10 MG tablet Take 1 tablet by mouth daily Indications: Condition caused by an Allergy   Yes Norberto Gibson MD   alendronate (FOSAMAX) 70 MG tablet Take 1 tablet by mouth every 28 days Indications: Osteopenia 10/16/17  Yes Norberto Gibson MD   lisinopril (PRINIVIL;ZESTRIL) 5 MG tablet Take 1 tablet by mouth daily Indications: Diabetes, Raised Blood Pressure   Yes Norberto Gibson MD   Albuterol Sulfate (VENTOLIN HFA IN) Inhale 2 puffs into the lungs every 4 hours as needed Indications: Asthma   Yes Norberto Gibson MD   warfarin (COUMADIN) 5 MG tablet TAKE DAILY AS DIRECTED BY [facility]  COUMADIN CLINIC 24   Julio Cesar Childers MD MOUNJARO 5 MG/0.5ML SOPN SC injection Inject

## 2024-11-01 NOTE — DISCHARGE INSTRUCTIONS
DILATATION & CURETTAGE AND/OR HYSTEROSCOPY    1) Since you may experience some intermittent light-headedness for the next several hours, we suggest you plan on bed rest or quiet relaxation this evening. You must have a friend or relative stay with you tonight.    2)  Because of the sedation you have received, it is recommended that you do not drive a motor vehicle, operate any kind of machinery, or sign any contractual agreement for 24 hours following the procedure.    3)  You should not take alcoholic beverages tonight and only take sleeping medication that has been specifically prescribed for you by your physician.    4)  Eat lightly for your first meal and then gradually progress yourself back to a regular diet.    5)  If you experience pain in your surgical area, take Tylenol, or the pain medication prescribed by your doctor. Some pain medications are very irritating when taken on an empty stomach, so try to take the medication with a light meal or a glass of milk.    6)  If you have any fever, chills, bleeding, or uncontrollable pain or any other problems, notify your surgeon.    7)  Other instructions:   Pain:  Ibuprofen every 6 hours as needed, call if no relief.   Activity:  Take it easy for 1-2 days   Exercise:  None for 1 week   Sex, douching, tampons:  None for 1 week   Shower:  In 24 hours   Tub bath, swimming:  None for 1 week   Appointment:  Call for an appointment in 1 week if appointment not already made.     Génesis Dwyer DO 11/1/2024 8:11 AM

## 2024-11-01 NOTE — PROGRESS NOTES
0814: Pt arrived to Phase I recovery via cart. Eyes closed with spontaneous respirations even and unlabored. Report received from PRETTY Allen and ALIZE Gudino.  0815: VSS. Patient awakes to voice. Returns to sleep. Warming device applied to gown for comfort. SCDs on. Cassie pad clean, dry and intact.   0820: VSS.  0825: VSS.  0829: Patient states pain 7/10 to lower abdomen/pelvis. [place] updated and order for Toradol given. See MAR. Warm blanket provided for abdomen for comfort.   0835: Patient still complaining of pain. BP elevated. Grimacing. Dr. Amin updated and order for Fentanyl given.  0839: Fentanyl given per order-see MAR.  0840: BP elevated.  0845: VSS.   0850: VSS. Patient states pain improved and rates 3/10 to lower pelvis. Will continue to monitor.  0855: VSS. Pt resting on cart with eyes closed, respirations even and unlabored.  0950: Discharge instructions reviewed with patient and patient's son. AVS provided for discharge. IV removed. Patient escorted to bathroom.  0955: Patient returned to room and dressed independently. Call light in reach.  1003: Patient escorted to vehicle and discharged home in stable condition with son.

## 2024-11-07 ENCOUNTER — ANTI-COAG VISIT (OUTPATIENT)
Age: 59
End: 2024-11-07
Payer: COMMERCIAL

## 2024-11-07 DIAGNOSIS — Z79.01 ANTICOAGULATED ON COUMADIN: ICD-10-CM

## 2024-11-07 DIAGNOSIS — I82.409 DEEP VEIN THROMBOSIS (DVT) OF LOWER EXTREMITY, UNSPECIFIED CHRONICITY, UNSPECIFIED LATERALITY, UNSPECIFIED VEIN (HCC): ICD-10-CM

## 2024-11-07 DIAGNOSIS — I26.99 PULMONARY EMBOLISM, UNSPECIFIED CHRONICITY, UNSPECIFIED PULMONARY EMBOLISM TYPE, UNSPECIFIED WHETHER ACUTE COR PULMONALE PRESENT (HCC): Primary | ICD-10-CM

## 2024-11-07 DIAGNOSIS — Z51.81 ENCOUNTER FOR THERAPEUTIC DRUG MONITORING: ICD-10-CM

## 2024-11-07 LAB — POC INR: 2.2 (ref 0.8–1.2)

## 2024-11-07 PROCEDURE — 99211 OFF/OP EST MAY X REQ PHY/QHP: CPT

## 2024-11-07 PROCEDURE — 85610 PROTHROMBIN TIME: CPT

## 2024-11-07 NOTE — PROGRESS NOTES
Medication Management Clinic  [facility]  Anticoagulation Clinic  [phone] (phone)  [phone] (fax)    Ms. Aimee Perez is a 59 y.o.  female with history of DVT, PE who presents today for anticoagulation monitoring and adjustment.    Patient verifies current dosing regimen and tablet strength.  No missed or extra doses.  Patient denies s/s bleeding/bruising/swelling/SOB  No blood in urine or stool.  No dietary changes.  No changes in medication/OTC agents/Herbals.  No change in alcohol use or tobacco use.  No change in activity level.  Patient denies falls.  No vomiting/diarrhea or acute illness.   No Procedures scheduled in the future at this time.    Has been bridging with Lovenox post-procedure. Had procedure 11/1/24.    Assessment:   Lab Results   Component Value Date    INR 2.20 (H) 11/07/2024    INR 1.10 11/01/2024    INR 2.38 (H) 10/18/2024     INR therapeutic   Recent Labs     11/07/24  0833   INR 2.20*     Plan:  STOP Lovenox and continue Coumadin MoWeFr and 10 mg SuTuThSa.  Recheck INR in 3 week(s).  Patient reminded to call the Anticoagulation Clinic with any signs or symptoms of bleeding or with any medication changes.  Patient given instructions utilizing the teach back method.    After visit summary printed and reviewed with patient.      Discharged ambulatory in no apparent distress.    For Pharmacy Admin Tracking Only  Time Spent (min): 15

## 2024-11-27 ENCOUNTER — ANTI-COAG VISIT (OUTPATIENT)
Age: 59
End: 2024-11-27
Payer: COMMERCIAL

## 2024-11-27 DIAGNOSIS — Z51.81 ENCOUNTER FOR THERAPEUTIC DRUG MONITORING: ICD-10-CM

## 2024-11-27 DIAGNOSIS — Z79.01 ANTICOAGULATED ON COUMADIN: Primary | ICD-10-CM

## 2024-11-27 LAB — POC INR: 2.8 (ref 0.8–1.2)

## 2024-11-27 PROCEDURE — 85610 PROTHROMBIN TIME: CPT | Performed by: PHARMACIST

## 2024-11-27 PROCEDURE — 99211 OFF/OP EST MAY X REQ PHY/QHP: CPT | Performed by: PHARMACIST

## 2024-11-27 NOTE — PROGRESS NOTES
Medication Management Clinic  [facility]  Anticoagulation Clinic  [phone] (phone)  [phone] (fax)    Ms. Aimee Perez is a 59 y.o.  female with history of DVT, PE who presents today for anticoagulation monitoring and adjustment.    Patient verifies current dosing regimen and tablet strength.  No missed or extra doses.  Patient denies s/s bleeding/bruising/swelling/SOB  No blood in urine or stool.  Dietary changes. Not too many greens this past week.   No changes in medication/OTC agents/Herbals. Inquiring about a new drink she has had in the past two times that has cucumber (low vitamin K without peel), celery (low vitamin k) and not sure what else mixed in it. She plans to drink twice a week every other week. She won't be starting for a couple weeks however.  No change in alcohol use or tobacco use.  No change in activity level.  Patient denies falls.  No vomitingor acute illness. About a week ago had a bug with some diarrhea. Nothing further.   No Procedures scheduled in the future at this time.    Assessment:   Lab Results   Component Value Date    INR 2.80 (H) 11/27/2024    INR 2.20 (H) 11/07/2024    INR 1.10 11/01/2024     INR therapeutic   Recent Labs     11/27/24  0931   INR 2.80*     Plan:  Continue Coumadin 15mg MWF 10mg TuThSS.  Recheck INR in 4 week(s).  Patient reminded to call the Anticoagulation Clinic with any signs or symptoms of bleeding or with any medication changes.  Patient given instructions utilizing the teach back method.    After visit summary printed and reviewed with patient.      Discharged ambulatory in no apparent distress.    For Pharmacy Admin Tracking Only    Intervention Detail:   Total # of Interventions Recommended: 0  Total # of Interventions Accepted: 0  Time Spent (min): 20

## 2024-12-23 ENCOUNTER — ANTI-COAG VISIT (OUTPATIENT)
Age: 59
End: 2024-12-23
Payer: COMMERCIAL

## 2024-12-23 DIAGNOSIS — I26.99 PULMONARY EMBOLISM, UNSPECIFIED CHRONICITY, UNSPECIFIED PULMONARY EMBOLISM TYPE, UNSPECIFIED WHETHER ACUTE COR PULMONALE PRESENT (HCC): Primary | ICD-10-CM

## 2024-12-23 DIAGNOSIS — Z51.81 ENCOUNTER FOR THERAPEUTIC DRUG MONITORING: ICD-10-CM

## 2024-12-23 DIAGNOSIS — I82.409 DEEP VEIN THROMBOSIS (DVT) OF LOWER EXTREMITY, UNSPECIFIED CHRONICITY, UNSPECIFIED LATERALITY, UNSPECIFIED VEIN (HCC): ICD-10-CM

## 2024-12-23 DIAGNOSIS — Z79.01 ANTICOAGULATED ON COUMADIN: ICD-10-CM

## 2024-12-23 LAB — POC INR: 2.8 (ref 0.8–1.2)

## 2024-12-23 PROCEDURE — 85610 PROTHROMBIN TIME: CPT

## 2024-12-23 PROCEDURE — 99211 OFF/OP EST MAY X REQ PHY/QHP: CPT

## 2024-12-23 NOTE — PROGRESS NOTES
Medication Management Clinic  [facility]  Anticoagulation Clinic  [phone] (phone)  [phone] (fax)    Ms. Aimee Perez is a 59 y.o.  female with history of DVT, PE who presents today for anticoagulation monitoring and adjustment.    Patient verifies current dosing regimen and tablet strength.  No missed or extra doses.  Patient denies s/s bleeding/bruising/swelling/SOB  No blood in urine or stool.  No dietary changes. - note from last appointment 11/27 regarding possibly starting a new drink with vitamin K in it- patient reports not taking and will let us know at her next appointment if she is going to start this  No changes in medication/OTC agents/Herbals.   No change in alcohol use or tobacco use.  No change in activity level.  Patient denies falls.  No vomiting/diarrhea or acute illness.   No Procedures scheduled in the future at this time.    Assessment:   Lab Results   Component Value Date    INR 2.80 (H) 12/23/2024    INR 2.80 (H) 11/27/2024    INR 2.20 (H) 11/07/2024     INR therapeutic   Recent Labs     12/23/24  0815   INR 2.80*         Plan:  Continue Coumadin 15 mg MWF, 10 mg TThSS.  Recheck INR in 4 week(s).  Patient reminded to call the Anticoagulation Clinic with any signs or symptoms of bleeding or with any medication changes.  Patient given instructions utilizing the teach back method.      After visit summary printed and reviewed with patient.      Discharged ambulatory in no apparent distress.      For Pharmacy Admin Tracking Only    Intervention Detail:   Total # of Interventions Recommended: 0  Total # of Interventions Accepted: 0  Time Spent (min): 20  Juju Gaines, PharmD 12/23/2024 8:22 AM

## 2025-01-21 ENCOUNTER — HOSPITAL ENCOUNTER (EMERGENCY)
Age: 60
Discharge: HOME | End: 2025-01-21
Payer: COMMERCIAL

## 2025-01-21 VITALS
BODY MASS INDEX: 35.17 KG/M2 | WEIGHT: 206 LBS | HEIGHT: 64 IN | DIASTOLIC BLOOD PRESSURE: 71 MMHG | SYSTOLIC BLOOD PRESSURE: 102 MMHG | TEMPERATURE: 98.6 F | OXYGEN SATURATION: 98 % | RESPIRATION RATE: 16 BRPM | HEART RATE: 114 BPM

## 2025-01-21 DIAGNOSIS — J10.1 INFLUENZA A H1N1 INFECTION: Primary | ICD-10-CM

## 2025-01-21 LAB
FLUAV AG SPEC QL: POSITIVE
FLUBV AG SPEC QL: NEGATIVE
SARS-COV-2 RDRP RESP QL NAA+PROBE: NOT  DETECTED

## 2025-01-21 PROCEDURE — 87804 INFLUENZA ASSAY W/OPTIC: CPT

## 2025-01-21 PROCEDURE — 87635 SARS-COV-2 COVID-19 AMP PRB: CPT

## 2025-01-21 PROCEDURE — 99214 OFFICE O/P EST MOD 30 MIN: CPT | Performed by: NURSE PRACTITIONER

## 2025-01-21 PROCEDURE — 99213 OFFICE O/P EST LOW 20 MIN: CPT

## 2025-01-21 RX ORDER — BENZONATATE 200 MG/1
200 CAPSULE ORAL 3 TIMES DAILY PRN
Qty: 21 CAPSULE | Refills: 0 | Status: SHIPPED | OUTPATIENT
Start: 2025-01-21 | End: 2025-01-28

## 2025-01-21 RX ORDER — AZELASTINE 1 MG/ML
1 SPRAY, METERED NASAL 2 TIMES DAILY
Qty: 30 ML | Refills: 0 | Status: SHIPPED | OUTPATIENT
Start: 2025-01-21

## 2025-01-21 RX ORDER — DIPHENHYDRAMINE HCL 25 MG
25 CAPSULE ORAL EVERY 6 HOURS PRN
COMMUNITY
Start: 2025-01-21 | End: 2025-01-31

## 2025-01-21 ASSESSMENT — ENCOUNTER SYMPTOMS
CHEST TIGHTNESS: 0
SHORTNESS OF BREATH: 0
WHEEZING: 0
SWOLLEN GLANDS: 0
SORE THROAT: 0
SINUS PAIN: 0
COUGH: 1
APNEA: 0
STRIDOR: 0
RHINORRHEA: 1
CHOKING: 0

## 2025-01-21 ASSESSMENT — PAIN - FUNCTIONAL ASSESSMENT: PAIN_FUNCTIONAL_ASSESSMENT: 0-10

## 2025-01-21 NOTE — DISCHARGE INSTRUCTIONS
Suggestions for symptom management that are available over the counter.   If you have questions regarding allergies or contraindications to use, please speak to the pharmacy staff or your family provider.    For fevers or pain: acetaminophen (Tylenol), ibuprofen (Motrin)  For dry cough: medications containing dextromethorphan, such as Delsym, Robitussin DM or Mucinex DM and medicated throat lozenges  For congestion or sinus pressure: decongestant nasal sprays, such as Afrin (for up to 3 days), medications containing guaifenesin to help break up mucus, such as Mucinex or Robitussin, nasal steroid sprays, such as Flonase, Sensimist, Rhinocort or Nasonex and saline nasal sprays, neti pot or sinus rinse bottle  For runny nose, sneezing or watery/itchy eyes: less sedating antihistamines, such as loratidine (Claritin), fexofenadine (Allegra) or Cetirizine (Zyrtec) and antihistamine eye drops, such as ketotifen (Zaditor, Alaway) or olopatadine (Pataday)  For sore throat:  Chloraseptic throat spray or sore throat lozenges or  Mucinex Instasoothe Sore Throat & Pain Cherry Spray  If you have high blood pressure, a brand like Coricidin HBP may be an option.you should avoid medications containing pseudoephedrine or phenylephrine, such as Sudafed,  running a humidifier in your bedroom may be helpful for many of your symptoms.  If your cough is keeping you awake at night, you can try raising your head with an extra pillow.  If the skin around your nose and lips becomes sore, you can put some petroleum jelly on the area.      Suggestions for over-the-counter supplements to help your immune system, if you have questions regarding allergies or contraindications to use, please speak to the pharmacy staff or your family provider.    Multi-vitamin/multi-mineral daily   Vitamin D3 3000 IU daily  Zinc 30 mg daily  Vitamin C 1000 mg twice daily  B-100 complex as daily as directed on bottle  Probiotic/Prebiotic williamson  Gargle with

## 2025-01-21 NOTE — ED PROVIDER NOTES
[facility] URGENT CARE  Urgent Care Encounter      CHIEF COMPLAINT       Chief Complaint   Patient presents with    Cough    Fever    Generalized Body Aches    Chest Congestion       Nurses Notes reviewed and I agree except as noted in the HPI.  HISTORY OFPRESENT ILLNESS   Aimee Perez is a 59 y.o.  The history is provided by the patient. No  was used.   Cold Symptoms  Presenting symptoms: congestion, cough, ear pain, fatigue and rhinorrhea    Presenting symptoms: no facial pain, no fever and no sore throat    Severity:  Severe  Onset quality:  Gradual  Duration:  2 days  Timing:  Constant  Progression:  Worsening  Chronicity:  New  Relieved by:  Nothing  Worsened by:  Certain positions  Ineffective treatments:  OTC medications  Associated symptoms: myalgias    Associated symptoms: no arthralgias, no headaches, no neck pain, no sinus pain, no sneezing, no swollen glands and no wheezing    Risk factors: chronic respiratory disease    Risk factors: not elderly, no chronic cardiac disease, no chronic kidney disease, no diabetes mellitus, no immunosuppression, no recent illness, no recent travel and no sick contacts        REVIEW OF SYSTEMS     Review of Systems   Constitutional:  Positive for fatigue. Negative for activity change, appetite change, chills, diaphoresis and fever.   HENT:  Positive for congestion, ear pain, postnasal drip and rhinorrhea. Negative for sinus pain, sneezing and sore throat.    Respiratory:  Positive for cough. Negative for apnea, choking, chest tightness, shortness of breath, wheezing and stridor.    Cardiovascular:  Negative for chest pain, palpitations and leg swelling.   Musculoskeletal:  Positive for myalgias. Negative for arthralgias and neck pain.   Neurological:  Positive for weakness. Negative for headaches.       PAST MEDICAL HISTORY         Diagnosis Date    Asthma     Depression     Diverticulitis     History of deep vein thrombophlebitis of lower  Diabetes, Raised Blood PressureHistorical Med      Albuterol Sulfate (VENTOLIN HFA IN) Inhale 2 puffs into the lungs every 4 hours as needed Indications: AsthmaHistorical Med             ALLERGIES     Patient is has No Known Allergies.    FAMILY HISTORY     Patient's family history includes Cancer in her mother; Diabetes in her brother; High Blood Pressure in her sister; High Cholesterol in her sister; Stroke in her maternal grandmother.    SOCIAL HISTORY     Patient  reports that she has never smoked. She has never used smokeless tobacco. She reports current alcohol use. She reports that she does not use drugs.    PHYSICAL EXAM     ED TRIAGE VITALS  BP: 102/71, Temp: 98.6 °F (37 °C), Pulse: (!) 114, Respirations: 16, SpO2: 98 %  Physical Exam  Vitals and nursing note reviewed.   Constitutional:       General: She is not in acute distress.     Appearance: Normal appearance. She is obese. She is not ill-appearing, toxic-appearing or diaphoretic.   HENT:      Head: Normocephalic and atraumatic.      Right Ear: External ear normal.      Left Ear: External ear normal.      Mouth/Throat:      Mouth: Mucous membranes are moist.   Eyes:      Extraocular Movements: Extraocular movements intact.      Conjunctiva/sclera: Conjunctivae normal.   Pulmonary:      Effort: Pulmonary effort is normal. No respiratory distress.      Breath sounds: Normal breath sounds. No stridor. No wheezing, rhonchi or rales.   Chest:      Chest wall: No tenderness.   Musculoskeletal:         General: Normal range of motion.      Cervical back: Normal range of motion.   Skin:     General: Skin is warm.   Neurological:      General: No focal deficit present.      Mental Status: She is alert and oriented to person, place, and time.   Psychiatric:         Mood and Affect: Mood normal.         Behavior: Behavior normal.         Thought Content: Thought content normal.         Judgment: Judgment normal.         DIAGNOSTIC RESULTS   Labs:  Results for

## 2025-01-23 ENCOUNTER — ANTI-COAG VISIT (OUTPATIENT)
Age: 60
End: 2025-01-23
Payer: COMMERCIAL

## 2025-01-23 DIAGNOSIS — Z51.81 ENCOUNTER FOR THERAPEUTIC DRUG MONITORING: ICD-10-CM

## 2025-01-23 DIAGNOSIS — I82.409 DEEP VEIN THROMBOSIS (DVT) OF LOWER EXTREMITY, UNSPECIFIED CHRONICITY, UNSPECIFIED LATERALITY, UNSPECIFIED VEIN (HCC): ICD-10-CM

## 2025-01-23 DIAGNOSIS — I26.99 PULMONARY EMBOLISM, UNSPECIFIED CHRONICITY, UNSPECIFIED PULMONARY EMBOLISM TYPE, UNSPECIFIED WHETHER ACUTE COR PULMONALE PRESENT (HCC): Primary | ICD-10-CM

## 2025-01-23 DIAGNOSIS — Z79.01 ANTICOAGULATED ON COUMADIN: ICD-10-CM

## 2025-01-23 LAB — POC INR: 2.5 (ref 0.8–1.2)

## 2025-01-23 PROCEDURE — 85610 PROTHROMBIN TIME: CPT

## 2025-01-23 PROCEDURE — 99211 OFF/OP EST MAY X REQ PHY/QHP: CPT

## 2025-01-23 NOTE — PROGRESS NOTES
Medication Management Clinic  [facility]  Anticoagulation Clinic  [phone] (phone)  [phone] (fax)    Ms. Aimee Perez is a 59 y.o.  female with history of DVT, PE who presents today for anticoagulation monitoring and adjustment.    Patient verifies current dosing regimen and tablet strength.  No missed or extra doses.  Patient denies s/s bleeding/bruising/swelling/SOB  No blood in urine or stool.  Dietary changes- Started drinking \"green drinks\" (contain celery, green apple, cucumber, lemon willow); has had two since last appointment and plans to drink 2-4 a month  Changes in medication/OTC agents/Herbals.- started using azelastine nasal spray and benzonatate capsules following urgent care visit 1/21 (med list updated)  No change in alcohol use or tobacco use.  No change in activity level.  Patient denies falls.  Vomiting/diarrhea or acute illness. - reports diarrhea on tuesday; patient also went to urgent care on 1/21 for cold/flu symptoms and is slowly getting back to normal routines  No Procedures scheduled in the future at this time.    Assessment:   Lab Results   Component Value Date    INR 2.50 (H) 01/23/2025    INR 2.80 (H) 12/23/2024    INR 2.80 (H) 11/27/2024     INR therapeutic   Recent Labs     01/23/25  0906   INR 2.50*      Plan:  Continue Coumadin 15 mg MWF and 10 mg TThSS.  Recheck INR in 4 week(s).  Patient reminded to call the Anticoagulation Clinic with any signs or symptoms of bleeding or with any medication changes.  Patient given instructions utilizing the teach back method.      After visit summary printed and reviewed with patient.      Discharged ambulatory in no apparent distress.     For Pharmacy Admin Tracking Only    Intervention Detail:   Total # of Interventions Recommended: 0  Total # of Interventions Accepted: 0  Time Spent (min): 20  Juju Gaines, PharmD 1/23/2025 9:14 AM

## 2025-02-17 ENCOUNTER — TELEPHONE (OUTPATIENT)
Age: 60
End: 2025-02-17

## 2025-02-17 NOTE — TELEPHONE ENCOUNTER
Spoke with patient. She states she missed her dose of Coumadin yesterday 2/16/25 (10 mg). Instructed patient to take Coumadin 20 mg x 1 dose today 2/17/25, then resume home dose of Coumadin 15 mg MWF and 10 mg TuThSaSu with next INR 2/20/25 @ 9 AM as originally planned. Patient voiced understanding. Patient given instructions utilizing the teach back method.    For Pharmacy Admin Tracking Only    Intervention Detail: Dose Adjustment: 1, reason: Therapy Optimization  Total # of Interventions Recommended: 1  Total # of Interventions Accepted: 1  Time Spent (min): 15    Baljinder Robert, AundreaD, BCPS  2/17/2025  4:03 PM

## 2025-02-20 ENCOUNTER — ANTI-COAG VISIT (OUTPATIENT)
Age: 60
End: 2025-02-20
Payer: COMMERCIAL

## 2025-02-20 DIAGNOSIS — Z51.81 ENCOUNTER FOR THERAPEUTIC DRUG MONITORING: ICD-10-CM

## 2025-02-20 DIAGNOSIS — I26.99 PULMONARY EMBOLISM, UNSPECIFIED CHRONICITY, UNSPECIFIED PULMONARY EMBOLISM TYPE, UNSPECIFIED WHETHER ACUTE COR PULMONALE PRESENT (HCC): Primary | ICD-10-CM

## 2025-02-20 DIAGNOSIS — I82.409 DEEP VEIN THROMBOSIS (DVT) OF LOWER EXTREMITY, UNSPECIFIED CHRONICITY, UNSPECIFIED LATERALITY, UNSPECIFIED VEIN (HCC): ICD-10-CM

## 2025-02-20 DIAGNOSIS — Z79.01 ANTICOAGULATED ON COUMADIN: ICD-10-CM

## 2025-02-20 LAB — POC INR: 2.7 (ref 0.8–1.2)

## 2025-02-20 PROCEDURE — 99212 OFFICE O/P EST SF 10 MIN: CPT | Performed by: PHARMACIST

## 2025-02-20 PROCEDURE — 85610 PROTHROMBIN TIME: CPT | Performed by: PHARMACIST

## 2025-02-20 RX ORDER — WARFARIN SODIUM 5 MG/1
TABLET ORAL
Qty: 225 TABLET | Refills: 3 | Status: SHIPPED | OUTPATIENT
Start: 2025-02-20

## 2025-02-20 NOTE — PROGRESS NOTES
Medication Management Clinic  [facility]  Anticoagulation Clinic  [phone] (phone)  [phone] (fax)    Ms. Aimee Perez is a 59 y.o.  female with history of DVT, PE who presents today for anticoagulation monitoring and adjustment.    Patient verifies current dosing regimen and tablet strength.  Missed dose on 2/16, took 20mg on 2/17 as instructed by [facility]  Patient denies s/s bleeding/bruising/swelling/SOB  No blood in urine or stool.  Eating less overall since having the flu end of January.   No changes in medication/OTC agents/Herbals.  No change in alcohol use or tobacco use.  No change in activity level.  Patient denies falls.  No vomiting/diarrhea or acute illness.   No Procedures scheduled in the future at this time.      Assessment:   Lab Results   Component Value Date    INR 2.70 (H) 02/20/2025    INR 2.50 (H) 01/23/2025    INR 2.80 (H) 12/23/2024     INR therapeutic   Recent Labs     02/20/25  0924   INR 2.70*         Plan:  Continue Coumadin 15mg MWF and 10mg TThSaS.  Recheck INR in 4 week(s).  Patient reminded to call the Anticoagulation Clinic with any signs or symptoms of bleeding or with any medication changes.  Patient given instructions utilizing the teach back method.    She would like to return to q6week check after next visit.     Refill sent per referring provider to [facility]    After visit summary printed and reviewed with patient.      Discharged ambulatory in no apparent distress.    Chery Randall, AudnreaD, BCPS, CTTS     For Pharmacy Admin Tracking Only    Intervention Detail: Refill(s) Provided  Total # of Interventions Recommended: 1  Total # of Interventions Accepted: 1  Time Spent (min): 20

## 2025-03-19 ENCOUNTER — ANTI-COAG VISIT (OUTPATIENT)
Age: 60
End: 2025-03-19
Payer: COMMERCIAL

## 2025-03-19 DIAGNOSIS — Z79.01 ANTICOAGULATED ON COUMADIN: ICD-10-CM

## 2025-03-19 DIAGNOSIS — I82.409 DEEP VEIN THROMBOSIS (DVT) OF LOWER EXTREMITY, UNSPECIFIED CHRONICITY, UNSPECIFIED LATERALITY, UNSPECIFIED VEIN: ICD-10-CM

## 2025-03-19 DIAGNOSIS — Z51.81 ENCOUNTER FOR THERAPEUTIC DRUG MONITORING: ICD-10-CM

## 2025-03-19 DIAGNOSIS — I26.99 PULMONARY EMBOLISM, UNSPECIFIED CHRONICITY, UNSPECIFIED PULMONARY EMBOLISM TYPE, UNSPECIFIED WHETHER ACUTE COR PULMONALE PRESENT (HCC): Primary | ICD-10-CM

## 2025-03-19 LAB — POC INR: 2 (ref 0.8–1.2)

## 2025-03-19 PROCEDURE — 99211 OFF/OP EST MAY X REQ PHY/QHP: CPT

## 2025-03-19 PROCEDURE — 85610 PROTHROMBIN TIME: CPT

## 2025-03-19 NOTE — PROGRESS NOTES
Medication Management Clinic  [facility]  Anticoagulation Clinic  [phone] (phone)  [phone] (fax)    Ms. Aimee Perez is a 60 y.o.  female with history of DVT/recurrent PE, per referral from ANTONIA Guzman, who presents today for Warfarin monitoring and adjustment (4 week visit).    Patient verifies current dosing regimen and tablet strength.  No missed or extra doses.  Patient denies bleeding/bruising/swelling/SOB.  No blood in urine or stool.  No dietary changes.  Weight stable.  No changes in medication/OTC agents/herbals.  No change in tobacco use. Had some extra alcohol 3/14.  Change in activity level: walked more last week on vacation - back to usual level now. Will probably increase late April, when weather stays nice.  Patient denies falls.  No vomiting/diarrhea or acute illness.   No procedures scheduled in the future at this time.      Assessment:     Lab Results   Component Value Date    INR 2.00 (H) 03/19/2025    INR 2.70 (H) 02/20/2025    INR 2.50 (H) 01/23/2025     INR therapeutic - goal 2-3.  Recent Labs     03/19/25  0914   INR 2.00*      Plan:  POCT INR performed/result reviewed.  Continue PO Coumadin 15 mg MWF, 10 mg TThSS.  Recheck INR in 4 week(s).  Patient reminded to call the Anticoagulation Clinic with any signs or symptoms of bleeding or with any medication changes.  Patient given instructions utilizing the teach back method.   After visit summary printed and reviewed with patient.      Discharged ambulatory in no apparent distress.    For Pharmacy Admin Tracking Only    Time Spent (min): 20

## 2025-03-20 ENCOUNTER — APPOINTMENT (OUTPATIENT)
Age: 60
End: 2025-03-20
Payer: COMMERCIAL

## 2025-04-17 ENCOUNTER — ANTI-COAG VISIT (OUTPATIENT)
Age: 60
End: 2025-04-17
Payer: COMMERCIAL

## 2025-04-17 DIAGNOSIS — I82.409 DEEP VEIN THROMBOSIS (DVT) OF LOWER EXTREMITY, UNSPECIFIED CHRONICITY, UNSPECIFIED LATERALITY, UNSPECIFIED VEIN: ICD-10-CM

## 2025-04-17 DIAGNOSIS — Z79.01 ANTICOAGULATED ON COUMADIN: ICD-10-CM

## 2025-04-17 DIAGNOSIS — I26.99 PULMONARY EMBOLISM, UNSPECIFIED CHRONICITY, UNSPECIFIED PULMONARY EMBOLISM TYPE, UNSPECIFIED WHETHER ACUTE COR PULMONALE PRESENT (HCC): Primary | ICD-10-CM

## 2025-04-17 DIAGNOSIS — Z51.81 ENCOUNTER FOR THERAPEUTIC DRUG MONITORING: ICD-10-CM

## 2025-04-17 LAB — POC INR: 2.8 (ref 0.8–1.2)

## 2025-04-17 PROCEDURE — 85610 PROTHROMBIN TIME: CPT

## 2025-04-17 PROCEDURE — 99212 OFFICE O/P EST SF 10 MIN: CPT

## 2025-04-17 RX ORDER — WARFARIN SODIUM 5 MG/1
TABLET ORAL
Qty: 225 TABLET | Refills: 3 | Status: SHIPPED | OUTPATIENT
Start: 2025-04-17

## 2025-04-17 NOTE — PROGRESS NOTES
Medication Management Clinic  [facility]  Anticoagulation Clinic  [phone] (phone)  [phone] (fax)    Ms. Aimee Perez is a 60 y.o.  female with history of DVT, PE who presents today for anticoagulation monitoring and adjustment.    Patient verifies current dosing regimen and tablet strength.  No missed or extra doses.  Patient denies s/s bleeding/bruising/swelling/SOB  No blood in urine or stool.  No dietary changes.  No changes in medication/OTC agents/Herbals.  No change in alcohol use or tobacco use.  No change in activity level.  Patient denies falls.  No vomiting/diarrhea or acute illness.   No Procedures scheduled in the future at this time.      Assessment:   Lab Results   Component Value Date    INR 2.80 (H) 04/17/2025    INR 2.00 (H) 03/19/2025    INR 2.70 (H) 02/20/2025     INR therapeutic   Recent Labs     04/17/25  0900   INR 2.80*     INR goal: 2.0-3.0    Plan:  Continue Coumadin 15 mg MoWeFr and 10 mg SuTuThSa.  Recheck INR in 5 week(s).  Patient reminded to call the Anticoagulation Clinic with any signs or symptoms of bleeding or with any medication changes.  Patient given instructions utilizing the teach back method.    She is changing to [facility] from mail order, request warfarin refill to [facility]- sent    After visit summary printed and reviewed with patient.      Discharged ambulatory in no apparent distress.    For Pharmacy Admin Tracking Only    Intervention Detail: Refill(s) Provided  Total # of Interventions Recommended: 1  Total # of Interventions Accepted: 1  Time Spent (min): 15

## 2025-05-22 ENCOUNTER — ANTI-COAG VISIT (OUTPATIENT)
Age: 60
End: 2025-05-22
Payer: COMMERCIAL

## 2025-05-22 DIAGNOSIS — Z51.81 ENCOUNTER FOR THERAPEUTIC DRUG MONITORING: ICD-10-CM

## 2025-05-22 DIAGNOSIS — I26.99 PULMONARY EMBOLISM, UNSPECIFIED CHRONICITY, UNSPECIFIED PULMONARY EMBOLISM TYPE, UNSPECIFIED WHETHER ACUTE COR PULMONALE PRESENT (HCC): Primary | ICD-10-CM

## 2025-05-22 DIAGNOSIS — Z79.01 ANTICOAGULATED ON COUMADIN: ICD-10-CM

## 2025-05-22 DIAGNOSIS — I82.409 DEEP VEIN THROMBOSIS (DVT) OF LOWER EXTREMITY, UNSPECIFIED CHRONICITY, UNSPECIFIED LATERALITY, UNSPECIFIED VEIN (HCC): ICD-10-CM

## 2025-05-22 LAB — POC INR: 2.5 (ref 0.8–1.2)

## 2025-05-22 PROCEDURE — 85610 PROTHROMBIN TIME: CPT | Performed by: PHARMACIST

## 2025-05-22 PROCEDURE — 99211 OFF/OP EST MAY X REQ PHY/QHP: CPT | Performed by: PHARMACIST

## 2025-05-22 NOTE — PROGRESS NOTES
Medication Management Clinic  [facility]  Anticoagulation Clinic  [phone] (phone)  [phone] (fax)    Ms. Aimee Perez is a 60 y.o.  female with history of DVT, PE who presents today for anticoagulation monitoring and adjustment.    Patient verifies current dosing regimen and tablet strength.  No missed or extra doses.  Patient denies s/s bleeding/bruising/swelling/SOB  No blood in urine or stool.  No dietary changes.  No changes in OTC agents/Herbals. Patient finished a course of Amoxicllin last weekend.   No change in alcohol use or tobacco use.  No change in activity level.  Patient denies falls.  No vomiting/diarrhea or acute illness.   No Procedures scheduled in the future at this time.    Assessment:   Lab Results   Component Value Date    INR 2.50 (H) 05/22/2025    INR 2.80 (H) 04/17/2025    INR 2.00 (H) 03/19/2025     INR therapeutic   Recent Labs     05/22/25  0727   INR 2.50*     INR goal: 2.0-3.0    Patient has been stable while on current regimen since November 2024.     Plan:  Continue Coumadin 15 mg MWF and 10 mg TuThSaSu.  Recheck INR in 5 week(s).  Patient reminded to call the Anticoagulation Clinic with any signs or symptoms of bleeding or with any medication changes.  Patient given instructions utilizing the teach back method.    After visit summary printed and reviewed with patient.      Discharged ambulatory in no apparent distress.    For Pharmacy Admin Tracking Only    Total # of Interventions Recommended: 0  Total # of Interventions Accepted: 0  Time Spent (min): 20    Aundrea VuongD, BCPS  5/22/2025  9:05 AM

## 2025-05-30 ENCOUNTER — HOSPITAL ENCOUNTER (EMERGENCY)
Age: 60
Discharge: HOME | End: 2025-05-30
Payer: COMMERCIAL

## 2025-05-30 VITALS
BODY MASS INDEX: 35.36 KG/M2 | HEART RATE: 70 BPM | RESPIRATION RATE: 20 BRPM | OXYGEN SATURATION: 97 % | WEIGHT: 206 LBS | DIASTOLIC BLOOD PRESSURE: 83 MMHG | TEMPERATURE: 97.2 F | SYSTOLIC BLOOD PRESSURE: 125 MMHG

## 2025-05-30 DIAGNOSIS — K11.20 PAROTITIS: Primary | ICD-10-CM

## 2025-05-30 PROCEDURE — 99213 OFFICE O/P EST LOW 20 MIN: CPT | Performed by: NURSE PRACTITIONER

## 2025-05-30 PROCEDURE — 99213 OFFICE O/P EST LOW 20 MIN: CPT

## 2025-05-30 RX ORDER — FLUTICASONE PROPIONATE 50 MCG
2 SPRAY, SUSPENSION (ML) NASAL DAILY
Qty: 1 EACH | Refills: 0 | Status: SHIPPED | OUTPATIENT
Start: 2025-05-30 | End: 2025-06-29

## 2025-05-30 RX ORDER — CEPHALEXIN 500 MG/1
500 CAPSULE ORAL 2 TIMES DAILY
Qty: 14 CAPSULE | Refills: 0 | Status: SHIPPED | OUTPATIENT
Start: 2025-05-30 | End: 2025-06-06

## 2025-05-30 ASSESSMENT — ENCOUNTER SYMPTOMS
SINUS PRESSURE: 0
SORE THROAT: 0
SHORTNESS OF BREATH: 0
GASTROINTESTINAL NEGATIVE: 1
SINUS PAIN: 0
RHINORRHEA: 0
COUGH: 0

## 2025-05-30 ASSESSMENT — PAIN DESCRIPTION - PAIN TYPE: TYPE: ACUTE PAIN

## 2025-05-30 ASSESSMENT — PAIN SCALES - GENERAL: PAINLEVEL_OUTOF10: 1

## 2025-05-30 ASSESSMENT — PAIN DESCRIPTION - LOCATION: LOCATION: EAR

## 2025-05-30 ASSESSMENT — PAIN - FUNCTIONAL ASSESSMENT: PAIN_FUNCTIONAL_ASSESSMENT: 0-10

## 2025-05-30 ASSESSMENT — PAIN DESCRIPTION - ORIENTATION: ORIENTATION: RIGHT

## 2025-05-30 ASSESSMENT — PAIN DESCRIPTION - DESCRIPTORS: DESCRIPTORS: ACHING

## 2025-05-30 ASSESSMENT — PAIN DESCRIPTION - FREQUENCY: FREQUENCY: CONTINUOUS

## 2025-05-30 NOTE — ED TRIAGE NOTES
Patient to room with c/o continued posterior right ear pain beginning four weeks ago. Oral antibiotics completed two weeks ago, without improvement.

## 2025-05-30 NOTE — DISCHARGE INSTRUCTIONS
Medications as prescribed.  Finish full course of antibiotics.  Use Flonase daily.  If symptoms are not improving, follow up with primary care provider for further evaluation.    You may use Tylenol over-the-counter as needed for discomfort.    Report to ED with new or severe symptoms.

## 2025-05-30 NOTE — ED PROVIDER NOTES
[facility] URGENT CARE  UrgentCare Encounter      CHIEFCOMPLAINT       Chief Complaint   Patient presents with    Ear Pain     Right         Nurses Notes reviewed and I agree except as noted in the HPI.  HISTORY OF PRESENT ILLNESS   Aimee Perez is a 60 y.o. female who presents to urgent care for complaint of right posterior ear and right jaw pain.  States that started approximately 4 weeks ago.  Was treated for an ear infection with amoxicillin.  Did not notice improvement after finishing antibiotic.  She denies fever, chills, body aches, cough, nasal congestion, sore throat, or other concerns.    REVIEW OF SYSTEMS     Review of Systems   Constitutional:  Negative for fatigue and fever.   HENT:  Positive for ear pain. Negative for congestion, ear discharge, rhinorrhea, sinus pressure, sinus pain, sneezing and sore throat.    Respiratory:  Negative for cough and shortness of breath.    Cardiovascular:  Negative for chest pain and palpitations.   Gastrointestinal: Negative.    Genitourinary: Negative.    Skin:  Negative for rash.   Neurological:  Negative for dizziness and headaches.   Psychiatric/Behavioral: Negative.             PAST MEDICAL HISTORY         Diagnosis Date    Asthma     Depression     Diverticulitis     History of deep vein thrombophlebitis of lower extremity     Osteoarthritis 04/2020    Osteopenia     PE (pulmonary embolism)     x2    Plantar fasciitis, right 03/2016    Sleep apnea     no CPAP has oral appliance    Type 2 diabetes mellitus without complication ([facility]) 04/25/2017       SURGICAL HISTORY     Patient  has a past surgical history that includes Vena Cava Filter Placement; Ankle surgery; other surgical history; Toe Surgery (06/25/2019); Dilation and curettage of uterus (N/A, 08/15/2019); back surgery (03/2022); Wrist surgery (Right); and Uterus surgery (N/A, 11/1/2024).    CURRENT MEDICATIONS       Discharge Medication List as of 5/30/2025 10:42 AM        CONTINUE these medications

## 2025-06-02 ENCOUNTER — HOSPITAL ENCOUNTER (EMERGENCY)
Age: 60
Discharge: HOME | End: 2025-06-02
Payer: COMMERCIAL

## 2025-06-02 VITALS
DIASTOLIC BLOOD PRESSURE: 88 MMHG | SYSTOLIC BLOOD PRESSURE: 142 MMHG | OXYGEN SATURATION: 99 % | TEMPERATURE: 97.6 F | RESPIRATION RATE: 16 BRPM | HEART RATE: 68 BPM

## 2025-06-02 DIAGNOSIS — M26.621 ARTHRALGIA OF RIGHT TEMPOROMANDIBULAR JOINT: Primary | ICD-10-CM

## 2025-06-02 PROCEDURE — 99283 EMERGENCY DEPT VISIT LOW MDM: CPT

## 2025-06-02 PROCEDURE — 6370000000 HC RX 637 (ALT 250 FOR IP): Performed by: PHYSICIAN ASSISTANT

## 2025-06-02 RX ORDER — PREDNISONE 20 MG/1
60 TABLET ORAL ONCE
Status: COMPLETED | OUTPATIENT
Start: 2025-06-02 | End: 2025-06-02

## 2025-06-02 RX ORDER — PREDNISONE 50 MG/1
50 TABLET ORAL DAILY
Qty: 4 TABLET | Refills: 0 | Status: SHIPPED | OUTPATIENT
Start: 2025-06-02 | End: 2025-06-06

## 2025-06-02 RX ADMIN — PREDNISONE 60 MG: 20 TABLET ORAL at 22:59

## 2025-06-02 ASSESSMENT — ENCOUNTER SYMPTOMS
SHORTNESS OF BREATH: 0
SINUS PRESSURE: 0
CHEST TIGHTNESS: 0
SORE THROAT: 0
RHINORRHEA: 0

## 2025-06-02 ASSESSMENT — PAIN SCALES - GENERAL: PAINLEVEL_OUTOF10: 6

## 2025-06-02 ASSESSMENT — PAIN - FUNCTIONAL ASSESSMENT: PAIN_FUNCTIONAL_ASSESSMENT: 0-10

## 2025-06-03 ENCOUNTER — TELEPHONE (OUTPATIENT)
Dept: ENT CLINIC | Age: 60
End: 2025-06-03

## 2025-06-03 NOTE — ED TRIAGE NOTES
Pt to ED from home with c/o right ear pain. Pt states pain 5-6/10. Pt states this has been ongoing for 1 month. Pt has finished antibiotics. Pt states no other complaints. Patient resting in bed. Respirations easy and unlabored. No distress noted. Call light within reach.

## 2025-06-03 NOTE — TELEPHONE ENCOUNTER
Patient has been seen at  x 3 and she was in ER.  They had referred her to our office.    Please advise where to schedule her.

## 2025-06-03 NOTE — TELEPHONE ENCOUNTER
ER provider note is incomplete from yesterday, but appears there is concern for chronic parotitis. Pt should complete steroid course follow instructions below:    Imperative to resolution of parotitis-  - Patient needs to keep oral cavity moist- oral fluids, swabs, ice chips.  - Apply moist heat for 15 minutes followed by gentle massage (over left parotid gland and towards front of mouth, this is to milk the gland. The duct from the gland empties in the mouth across from the top molars.).  - Use sialogogues (increases saliva production)- lemon wedges, sour candies (warheads, lemon drops, etc) HOURLY.     If despite these things she experiences increased facial swelling, increased pain, ear drainage, redness of posterior ear/face, fevers, or chills she should present to the ED again for re-evaluation    Ideally she should be scheduled as a new pt with any provider in the next couple of weeks

## 2025-06-03 NOTE — DISCHARGE INSTRUCTIONS
Please take Tylenol 650 mg every 6 hours as needed for pain    Suck on sour candies throughout the day to increase saliva production    Take the steroids as directed.  Please monitor your blood sugars as the steroids can increase your blood sugar.  If your sugars approached 300 stop the steroids

## 2025-06-03 NOTE — ED PROVIDER NOTES
[facility] EMERGENCY DEPT      Pt Name: Aimee Perez  MRN: [id number]  Birthdate 1965  Date of evaluation: 6/2/2025  Provider: Antionette Conway PA-C    CHIEF COMPLAINT       Chief Complaint   Patient presents with    Ear Pain       Nurses Notes reviewed and I agree except as noted in the HPI.      HISTORY OF PRESENT ILLNESS    Aimee Perze is a 60 y.o. female who presents for continued complaints of right ear pain x1 month. Patient was seen a total of 3 times over the last month for her symptoms, where she was originally given and completed a 10-day course of amoxicillin and then cephalexin 500 mg for a parotitis diagnosis when symptoms failed to resolve. She states her symptoms began as a quarter-sized pain posterior to her right ear, but has since spread through her right jaw. She denies taking anything for pain and states that cold soda and laughing has caused worsening pain episodes. She denies hearing loss in right ear, history of cold symptoms, dizziness, or light-headedness. Patient will be flying on July 1st, so she wants to make sure she has not perforated her tympanic membrane or anything that may impact her ability to fly.       REVIEW OF SYSTEMS     Review of Systems   Constitutional:  Negative for activity change, appetite change and fever.   HENT:  Positive for ear pain and hearing loss (Not acute, states she believes is left ear). Negative for congestion, ear discharge, postnasal drip, rhinorrhea, sinus pressure, sneezing, sore throat, tinnitus and trouble swallowing.    Eyes:  Negative for visual disturbance.   Respiratory:  Negative for chest tightness and shortness of breath.    Cardiovascular:  Negative for chest pain.   Gastrointestinal:  Negative for nausea and vomiting.   Musculoskeletal:  Positive for gait problem (States she has been \"off balance\" the last few months she equates to aging).   Skin:  Negative for color change and rash.   Neurological:  Negative for dizziness,

## 2025-06-04 ASSESSMENT — ENCOUNTER SYMPTOMS
TROUBLE SWALLOWING: 0
COLOR CHANGE: 0
NAUSEA: 0
VOMITING: 0

## 2025-06-04 NOTE — TELEPHONE ENCOUNTER
Encounter covered in detail with patient.   She states that she has been using hard candies and it has been helping.    I scheduled her in with Dr. Pham at 1:45 pm tomorrow for evalation.

## 2025-06-05 ENCOUNTER — OFFICE VISIT (OUTPATIENT)
Dept: ENT CLINIC | Age: 60
End: 2025-06-05
Payer: COMMERCIAL

## 2025-06-05 VITALS
BODY MASS INDEX: 36.66 KG/M2 | WEIGHT: 214.7 LBS | OXYGEN SATURATION: 100 % | DIASTOLIC BLOOD PRESSURE: 82 MMHG | TEMPERATURE: 98.4 F | SYSTOLIC BLOOD PRESSURE: 126 MMHG | HEART RATE: 56 BPM | HEIGHT: 64 IN

## 2025-06-05 DIAGNOSIS — K11.20 PAROTIDITIS: Primary | ICD-10-CM

## 2025-06-05 PROCEDURE — 99203 OFFICE O/P NEW LOW 30 MIN: CPT | Performed by: OTOLARYNGOLOGY

## 2025-06-05 NOTE — PROGRESS NOTES
[facility] [place] [facility]  [facility] EAR, NOSE AND THROAT  [address]   [address]  [facility] [address]  Dept: [phone]  Dept Fax: [phone]  Loc: [phone]    Aimee Perez is a 60 y.o. female who was referred byNo ref. provider found for:  Chief Complaint   Patient presents with    chronic parotitis     Patient here for evaluation of her chronic parotitis. Referred by [facility] ER.    .    HPI:     Aimee Perez is a 60 y.o. female who presents today for chronic parotiditis.Aimee Perez is a 60 y.o. female who presents for continued complaints of right ear pain x1 month. Patient was seen a total of 3 times over the last month for her symptoms, where she was originally given and completed a 10-day course of amoxicillin and then cephalexin 500 mg for a parotitis diagnosis when symptoms failed to resolve. She states her symptoms began as a quarter-sized pain posterior to her right ear, but has since spread through her right jaw. She denies taking anything for pain and states that cold soda and laughing has caused worsening pain episodes. She denies hearing loss in right ear, history of cold symptoms, dizziness, or light-headedness.  According to the emergency visit on 6/2/2025: that is most consistent with the diagnosis of noninfectious parotitis versus TMJ pain as it was now more in the jaw.  She was put on prednisone 50 mg daily for 4 days.    History:     No Known Allergies  Current Outpatient Medications   Medication Sig Dispense Refill    predniSONE (DELTASONE) 50 MG tablet Take 1 tablet by mouth daily for 4 days 4 tablet 0    warfarin (COUMADIN) 5 MG tablet TAKE DAILY AS DIRECTED BY [facility] Med Mgmt 225 tabs = 90 days 225 tablet 3    acetaminophen (TYLENOL) 500 MG tablet Take 2 tablets by mouth every 6 hours as needed for Pain or Fever Don't take more than 3,000 mg each day.      MOUNJARO 5 MG/0.5ML SOPN SC injection Inject into the skin once a week Takes on Thurs

## 2025-06-26 ENCOUNTER — ANTI-COAG VISIT (OUTPATIENT)
Age: 60
End: 2025-06-26
Payer: COMMERCIAL

## 2025-06-26 DIAGNOSIS — I26.99 PULMONARY EMBOLISM, UNSPECIFIED CHRONICITY, UNSPECIFIED PULMONARY EMBOLISM TYPE, UNSPECIFIED WHETHER ACUTE COR PULMONALE PRESENT (HCC): Primary | ICD-10-CM

## 2025-06-26 DIAGNOSIS — I82.409 DEEP VEIN THROMBOSIS (DVT) OF LOWER EXTREMITY, UNSPECIFIED CHRONICITY, UNSPECIFIED LATERALITY, UNSPECIFIED VEIN (HCC): ICD-10-CM

## 2025-06-26 DIAGNOSIS — Z51.81 ENCOUNTER FOR THERAPEUTIC DRUG MONITORING: ICD-10-CM

## 2025-06-26 DIAGNOSIS — Z79.01 ANTICOAGULATED ON COUMADIN: ICD-10-CM

## 2025-06-26 LAB — POC INR: 2.9 (ref 0.8–1.2)

## 2025-06-26 PROCEDURE — 99211 OFF/OP EST MAY X REQ PHY/QHP: CPT

## 2025-06-26 PROCEDURE — 85610 PROTHROMBIN TIME: CPT

## 2025-06-26 NOTE — PROGRESS NOTES
[facility]  [facility]  Anticoagulation Clinic  [phone] (phone)  [phone] (fax)    Ms. Aimee Perez is a 60 y.o.  female with history of DVT, PE who presents today for anticoagulation monitoring and adjustment.    Patient verifies current dosing regimen and tablet strength.  Two ER visits since last visit (5/30 and 6/2), started on cephalexin x 7 days and prednisone x4 days but both courses ended over 3 weeks ago  No missed or extra doses.  Patient denies s/s bleeding/bruising/swelling/SOB  No blood in urine or stool.  No dietary changes.  No changes in medication/OTC agents/Herbals.  No change in alcohol use or tobacco use.  No change in activity level.  Patient denies falls.  No vomiting/diarrhea or acute illness.   No Procedures scheduled in the future at this time.    Assessment:   Lab Results   Component Value Date    INR 2.90 (H) 06/26/2025    INR 2.50 (H) 05/22/2025    INR 2.80 (H) 04/17/2025     INR therapeutic   Recent Labs     06/26/25  0840   INR 2.90*       INR goal: 2.0-3.0    Plan:  Continue Coumadin 15 mg MWF and 10 mg TThSS.  Recheck INR in 6 week(s).  Patient reminded to call the Anticoagulation Clinic with any signs or symptoms of bleeding or with any medication changes.  Patient given instructions utilizing the teach back method.     After visit summary printed and reviewed with patient.      Discharged ambulatory in no apparent distress.  For Pharmacy Admin Tracking Only    Intervention Detail:   Total # of Interventions Recommended: 0  Total # of Interventions Accepted: 0  Time Spent (min): 20    Juju Gaines, PharmD 6/26/2025 9:10 AM

## 2025-06-28 ENCOUNTER — HOSPITAL ENCOUNTER (OUTPATIENT)
Dept: CT IMAGING | Age: 60
Discharge: HOME | End: 2025-06-28
Attending: OTOLARYNGOLOGY
Payer: COMMERCIAL

## 2025-06-28 DIAGNOSIS — K11.20 PAROTIDITIS: ICD-10-CM

## 2025-06-28 LAB — POC CREATININE WHOLE BLOOD: 1 MG/DL (ref 0.5–1.2)

## 2025-06-28 PROCEDURE — 82565 ASSAY OF CREATININE: CPT

## 2025-06-28 PROCEDURE — 6360000004 HC RX CONTRAST MEDICATION: Performed by: OTOLARYNGOLOGY

## 2025-06-28 PROCEDURE — 70491 CT SOFT TISSUE NECK W/DYE: CPT

## 2025-06-28 RX ORDER — IOPAMIDOL 755 MG/ML
75 INJECTION, SOLUTION INTRAVASCULAR
Status: COMPLETED | OUTPATIENT
Start: 2025-06-28 | End: 2025-06-28

## 2025-06-28 RX ADMIN — IOPAMIDOL 75 ML: 755 INJECTION, SOLUTION INTRAVENOUS at 07:51

## 2025-07-10 ENCOUNTER — OFFICE VISIT (OUTPATIENT)
Dept: ENT CLINIC | Age: 60
End: 2025-07-10
Payer: COMMERCIAL

## 2025-07-10 VITALS
TEMPERATURE: 97.5 F | BODY MASS INDEX: 36 KG/M2 | OXYGEN SATURATION: 100 % | SYSTOLIC BLOOD PRESSURE: 132 MMHG | RESPIRATION RATE: 16 BRPM | HEART RATE: 67 BPM | DIASTOLIC BLOOD PRESSURE: 82 MMHG | WEIGHT: 210.9 LBS | HEIGHT: 64 IN

## 2025-07-10 DIAGNOSIS — K11.20 PAROTIDITIS: ICD-10-CM

## 2025-07-10 DIAGNOSIS — R52 REFERRED PAIN: Primary | ICD-10-CM

## 2025-07-10 DIAGNOSIS — R93.89 ABNORMAL CT SCAN: ICD-10-CM

## 2025-07-10 PROCEDURE — 99212 OFFICE O/P EST SF 10 MIN: CPT | Performed by: OTOLARYNGOLOGY

## 2025-07-10 PROCEDURE — 31575 DIAGNOSTIC LARYNGOSCOPY: CPT | Performed by: OTOLARYNGOLOGY

## 2025-07-10 RX ORDER — IBUPROFEN 200 MG
CAPSULE ORAL
COMMUNITY

## 2025-07-10 RX ORDER — ALBUTEROL SULFATE 90 UG/1
INHALANT RESPIRATORY (INHALATION)
COMMUNITY
Start: 2025-04-24

## 2025-07-10 RX ORDER — TIRZEPATIDE 5 MG/.5ML
INJECTION, SOLUTION SUBCUTANEOUS
COMMUNITY
Start: 2025-04-25

## 2025-07-10 NOTE — PROGRESS NOTES
[facility] [place] [facility]  [facility] EAR, NOSE AND THROAT  [address]  [address]  [place] [id number]  Dept: [phone]  Dept Fax: [phone]  Loc: [phone]    Aimee Perez is a 60 y.o. female who was referred byNo ref. provider found for:  Chief Complaint   Patient presents with    Follow-up     4 week follow up. Review CT scan completed on 06/29/25. Patient states she is feeling fine just still has some discomfort behind right ear.   .    HPI:   Pt presented for follow up. Neck CT scan 6/29/25: Normal-appearing salivary glands. Soft tissue fullness at the level of the left piriform sinus. Direct  inspection is recommended to exclude an underlying mass/mucosal lesion.      Previous histories:    Aimee Perez is a 60 y.o. female who presents today for chronic parotiditis.Aimee Perez is a 60 y.o. female who presents for continued complaints of right ear pain x1 month. Patient was seen a total of 3 times over the last month for her symptoms, where she was originally given and completed a 10-day course of amoxicillin and then cephalexin 500 mg for a parotitis diagnosis when symptoms failed to resolve. She states her symptoms began as a quarter-sized pain posterior to her right ear, but has since spread through her right jaw. She denies taking anything for pain and states that cold soda and laughing has caused worsening pain episodes. She denies hearing loss in right ear, history of cold symptoms, dizziness, or light-headedness.  According to the emergency visit on 6/2/2025: that is most consistent with the diagnosis of noninfectious parotitis versus TMJ pain as it was now more in the jaw.  She was put on prednisone 50 mg daily for 4 days.    History:     Allergies   Allergen Reactions    Environmental/Seasonal      Other Reaction(s): Unknown     Current Outpatient Medications   Medication Sig Dispense Refill    calcium carbonate (OYSTER SHELL CALCIUM 500 MG) 1250 (500

## 2025-07-19 ENCOUNTER — HOSPITAL ENCOUNTER (EMERGENCY)
Age: 60
Discharge: HOME | End: 2025-07-19
Attending: EMERGENCY MEDICINE
Payer: COMMERCIAL

## 2025-07-19 ENCOUNTER — APPOINTMENT (OUTPATIENT)
Dept: GENERAL RADIOLOGY | Age: 60
End: 2025-07-19
Payer: COMMERCIAL

## 2025-07-19 VITALS
BODY MASS INDEX: 36.02 KG/M2 | OXYGEN SATURATION: 98 % | SYSTOLIC BLOOD PRESSURE: 132 MMHG | DIASTOLIC BLOOD PRESSURE: 98 MMHG | HEART RATE: 67 BPM | TEMPERATURE: 97.8 F | RESPIRATION RATE: 19 BRPM | WEIGHT: 210 LBS

## 2025-07-19 DIAGNOSIS — R07.9 CHEST PAIN, UNSPECIFIED TYPE: Primary | ICD-10-CM

## 2025-07-19 LAB
ANION GAP SERPL CALC-SCNC: 13 MEQ/L (ref 8–16)
APTT PPP: 46 SECONDS (ref 22–38)
BASOPHILS ABSOLUTE: 0 THOU/MM3 (ref 0–0.1)
BASOPHILS NFR BLD AUTO: 0.4 %
BUN SERPL-MCNC: 13 MG/DL (ref 8–23)
CALCIUM SERPL-MCNC: 9.5 MG/DL (ref 8.8–10.2)
CHLORIDE SERPL-SCNC: 104 MEQ/L (ref 98–111)
CO2 SERPL-SCNC: 23 MEQ/L (ref 22–29)
CREAT SERPL-MCNC: 1.1 MG/DL (ref 0.5–0.9)
D DIMER PPP IA.FEU-MCNC: 264 NG/ML FEU (ref 0–500)
DEPRECATED RDW RBC AUTO: 50.2 FL (ref 35–45)
EKG ATRIAL RATE: 65 BPM
EKG P AXIS: 57 DEGREES
EKG P-R INTERVAL: 138 MS
EKG Q-T INTERVAL: 390 MS
EKG QRS DURATION: 84 MS
EKG QTC CALCULATION (BAZETT): 405 MS
EKG R AXIS: 19 DEGREES
EKG T AXIS: 28 DEGREES
EKG VENTRICULAR RATE: 65 BPM
EOSINOPHIL NFR BLD AUTO: 1.2 %
EOSINOPHILS ABSOLUTE: 0.1 THOU/MM3 (ref 0–0.4)
ERYTHROCYTE [DISTWIDTH] IN BLOOD BY AUTOMATED COUNT: 15.4 % (ref 11.5–14.5)
GFR SERPL CREATININE-BSD FRML MDRD: 57 ML/MIN/1.73M2
GLUCOSE SERPL-MCNC: 91 MG/DL (ref 74–109)
HCT VFR BLD AUTO: 39.4 % (ref 37–47)
HGB BLD-MCNC: 12.7 GM/DL (ref 12–16)
IMM GRANULOCYTES # BLD AUTO: 0 THOU/MM3 (ref 0–0.07)
IMM GRANULOCYTES NFR BLD AUTO: 0 %
INR PPP: 3.1 (ref 0.85–1.13)
LYMPHOCYTES ABSOLUTE: 2 THOU/MM3 (ref 1–4.8)
LYMPHOCYTES NFR BLD AUTO: 41.1 %
MCH RBC QN AUTO: 28.6 PG (ref 26–33)
MCHC RBC AUTO-ENTMCNC: 32.2 GM/DL (ref 32.2–35.5)
MCV RBC AUTO: 88.7 FL (ref 81–99)
MONOCYTES ABSOLUTE: 0.4 THOU/MM3 (ref 0.4–1.3)
MONOCYTES NFR BLD AUTO: 7.3 %
NEUTROPHILS ABSOLUTE: 2.4 THOU/MM3 (ref 1.8–7.7)
NEUTROPHILS NFR BLD AUTO: 50 %
NRBC BLD AUTO-RTO: 0 /100 WBC
NT-PROBNP SERPL IA-MCNC: 56 PG/ML (ref 0–124)
OSMOLALITY SERPL CALC.SUM OF ELEC: 279.1 MOSMOL/KG (ref 275–300)
PLATELET # BLD AUTO: 295 THOU/MM3 (ref 130–400)
PMV BLD AUTO: 10.1 FL (ref 9.4–12.4)
POTASSIUM SERPL-SCNC: 4.3 MEQ/L (ref 3.5–5.2)
PROTHROMBIN TIME: 35.8 SECONDS (ref 10–13.5)
RBC # BLD AUTO: 4.44 MILL/MM3 (ref 4.2–5.4)
SODIUM SERPL-SCNC: 140 MEQ/L (ref 135–145)
TROPONIN, HIGH SENSITIVITY: < 6 NG/L (ref 0–12)
WBC # BLD AUTO: 4.8 THOU/MM3 (ref 4.8–10.8)

## 2025-07-19 PROCEDURE — 99214 OFFICE O/P EST MOD 30 MIN: CPT

## 2025-07-19 PROCEDURE — 84484 ASSAY OF TROPONIN QUANT: CPT

## 2025-07-19 PROCEDURE — 93010 ELECTROCARDIOGRAM REPORT: CPT | Performed by: NUCLEAR MEDICINE

## 2025-07-19 PROCEDURE — 85379 FIBRIN DEGRADATION QUANT: CPT

## 2025-07-19 PROCEDURE — 85025 COMPLETE CBC W/AUTO DIFF WBC: CPT

## 2025-07-19 PROCEDURE — 80048 BASIC METABOLIC PNL TOTAL CA: CPT

## 2025-07-19 PROCEDURE — 99215 OFFICE O/P EST HI 40 MIN: CPT

## 2025-07-19 PROCEDURE — 6370000000 HC RX 637 (ALT 250 FOR IP)

## 2025-07-19 PROCEDURE — 85610 PROTHROMBIN TIME: CPT

## 2025-07-19 PROCEDURE — 93005 ELECTROCARDIOGRAM TRACING: CPT

## 2025-07-19 PROCEDURE — 83880 ASSAY OF NATRIURETIC PEPTIDE: CPT

## 2025-07-19 PROCEDURE — 36415 COLL VENOUS BLD VENIPUNCTURE: CPT

## 2025-07-19 PROCEDURE — 99284 EMERGENCY DEPT VISIT MOD MDM: CPT

## 2025-07-19 PROCEDURE — 85730 THROMBOPLASTIN TIME PARTIAL: CPT

## 2025-07-19 PROCEDURE — 71046 X-RAY EXAM CHEST 2 VIEWS: CPT

## 2025-07-19 RX ORDER — LIDOCAINE 50 MG/G
1 PATCH TOPICAL DAILY
Qty: 10 PATCH | Refills: 0 | Status: SHIPPED | OUTPATIENT
Start: 2025-07-19 | End: 2025-07-29

## 2025-07-19 RX ORDER — ASPIRIN 81 MG/1
TABLET, CHEWABLE ORAL
Status: COMPLETED
Start: 2025-07-19 | End: 2025-07-19

## 2025-07-19 RX ORDER — ASPIRIN 81 MG/1
324 TABLET, CHEWABLE ORAL ONCE
Status: COMPLETED | OUTPATIENT
Start: 2025-07-19 | End: 2025-07-19

## 2025-07-19 RX ADMIN — ASPIRIN 324 MG: 81 TABLET, CHEWABLE ORAL at 12:27

## 2025-07-19 ASSESSMENT — PAIN - FUNCTIONAL ASSESSMENT
PAIN_FUNCTIONAL_ASSESSMENT: NONE - DENIES PAIN
PAIN_FUNCTIONAL_ASSESSMENT: 0-10
PAIN_FUNCTIONAL_ASSESSMENT: 0-10

## 2025-07-19 ASSESSMENT — ENCOUNTER SYMPTOMS: CHEST TIGHTNESS: 1

## 2025-07-19 ASSESSMENT — PAIN DESCRIPTION - DESCRIPTORS: DESCRIPTORS: PRESSURE

## 2025-07-19 ASSESSMENT — PAIN DESCRIPTION - FREQUENCY: FREQUENCY: CONTINUOUS

## 2025-07-19 ASSESSMENT — PAIN DESCRIPTION - LOCATION
LOCATION: CHEST
LOCATION: CHEST

## 2025-07-19 ASSESSMENT — PAIN DESCRIPTION - PAIN TYPE: TYPE: ACUTE PAIN

## 2025-07-19 ASSESSMENT — PAIN SCALES - GENERAL
PAINLEVEL_OUTOF10: 8
PAINLEVEL_OUTOF10: 0

## 2025-07-19 ASSESSMENT — PAIN DESCRIPTION - ORIENTATION: ORIENTATION: LEFT;UPPER

## 2025-07-19 NOTE — ED TRIAGE NOTES
Patient to room from registration. C/o left, upper chest pain beginning three days ago. Denies injury. EKG completed. Provider notified.

## 2025-07-19 NOTE — ED NOTES
Patient declines ambulance transport, elects to drive self to [facility] ED.  Patient instructed to go directly there.     Laurie Arzola, RN  07/19/25 7209

## 2025-07-19 NOTE — ED PROVIDER NOTES
note that portions of this note were completed with a voice recognition program.  Efforts were made to edit the dictations but occasionally words aremis-transcribed.)    MD Todd Wiggins, MD Chaz  07/19/25 6573

## 2025-07-19 NOTE — ED PROVIDER NOTES
[facility] EMERGENCY DEPARTMENT  EMERGENCY DEPARTMENT ENCOUNTER          Pt Name: Aimee Perez  MRN: [id number]  Birthdate 1965  Date of evaluation: 7/19/2025  Physician: Kendall Greer MD  Supervising Attending Physician: Chaz Brooks MD       CHIEF COMPLAINT       Chief Complaint   Patient presents with    Chest Pain     Left, upper         HISTORY OF PRESENT ILLNESS    HPI  Aimee Peerz is a 60 y.o. female who presents to the emergency department from home for evaluation of chest pain.  Patient states that chest pain began 3 days ago.  Describes it as worse with breathing.  And worse with sitting up.  States that there is no pain when she is lying down but is 2-3 out of 10 whenever she is sitting up.  Denies any shortness of breath nausea vomiting other chest pain states that chest pain is nonradiating.  Patient has past medical history of PE and DVT.  On Coumadin. The patient has no other acute complaints at this time.      PAST MEDICAL AND SURGICAL HISTORY     Past Medical History:   Diagnosis Date    Asthma     Depression     Diverticulitis     History of deep vein thrombophlebitis of lower extremity     Osteoarthritis 04/2020    Osteopenia     PE (pulmonary embolism)     x2    Plantar fasciitis, right 03/2016    Sleep apnea     no CPAP has oral appliance    Type 2 diabetes mellitus without complication (HCC) 04/25/2017     Past Surgical History:   Procedure Laterality Date    ANKLE SURGERY      Left Ankle. plate    BACK SURGERY  03/2022    fusion    DILATION AND CURETTAGE OF UTERUS N/A 08/15/2019    HYSTEROSCOPY DILATATION AND CURETTAGE performed by Génesis Dwyer DO at [facility] OR    OTHER SURGICAL HISTORY      right wrist for tendonitis    TOE SURGERY  06/25/2019    LEFT SECOND TOE    UTERUS SURGERY N/A 11/1/2024    Hysteroscopy Dilation and Curettage with Aveta performed by Génesis Dwyer DO at [facility] OR    VENA CAVA FILTER PLACEMENT      WRIST SURGERY  other components within normal limits   BRAIN NATRIURETIC PEPTIDE   TROPONIN   D-DIMER, QUANTITATIVE   ANION GAP   OSMOLALITY     All laboratory results are individually reviewed and interpreted by me in the clinical context of this patient.  See ED course below for results interpretation if applicable.  (A negative COVID-19 test should be interpreted as COVID no longer suspected unless otherwise noted in this encounter documentation note)  (Any cultures that may have been sent were not resulted at the time of this patient ED visit)      Radiologic studies results available at the moment of this note (None if blank):  XR CHEST (2 VW)   Final Result   No interval change since previous study dated 7/30/2024..               **This report has been created using voice recognition software. It may contain   minor errors which are inherent in voice recognition technology.**      Electronically signed by Dr. Franklin Sky        See ED course below for my interpretation if applicable.  All radiology images independently reviewed by me in the clinical context of this patient, in addition to interpretation provided by the radiologist.      EKG interpretation:  See ED course (if applicable) [none if blank]  EKG from today showed normal sinus rhythm.      PREVIOUS RECORDS  AND EXTERNAL INFORMATION REVIEWED   History obtained from: the patient.  Pertinent previous and/or external records reviewed: Noncontributory.  Case discussed with specialties other than Emergency Medicine: Not Applicable      MEDICAL DECISION MAKING   Differential Diagnosis includes but is not limited to:  - Pleuritis, MSK chest pain, PE, ACS    Decision Rules/Clinical Scores utilized:    Not Applicable    MIPS documentation:  N/A    Medical Decision Making  60-year-old female patient complaining of left upper chest pain.  Patient states that pain is reproducible with palpation and inspiration.  Denies that the chest pain is nonradiating denies any pain while

## 2025-07-19 NOTE — ED PROVIDER NOTES
[facility] EMERGENCY DEPARTMENT  Urgent Care Encounter      CHIEF COMPLAINT       Chief Complaint   Patient presents with    Chest Pain     Left, upper       Nurses Notes reviewed and I agree except as noted in the HPI.  HISTORY OF PRESENT ILLNESS   Aimee Perez is a 60 y.o. female who presents to urgent care with complaints of left-sided chest pain.  Patient reports symptoms started 3 days ago.  Patient reports yesterday chest pain worsened within the day before work.  Reports it feels almost like a crushing pain.  Patient denies shortness of breath, fevers, cardiac history.    REVIEW OF SYSTEMS     Review of Systems   Constitutional:  Negative for fever.   Respiratory:  Positive for chest tightness.    Cardiovascular:  Positive for chest pain. Negative for palpitations and leg swelling.   Neurological:  Negative for dizziness, seizures and numbness.       PAST MEDICAL HISTORY         Diagnosis Date    Asthma     Depression     Diverticulitis     History of deep vein thrombophlebitis of lower extremity     Osteoarthritis 04/2020    Osteopenia     PE (pulmonary embolism)     x2    Plantar fasciitis, right 03/2016    Sleep apnea     no CPAP has oral appliance    Type 2 diabetes mellitus without complication ([facility]) 04/25/2017       SURGICAL HISTORY     Patient  has a past surgical history that includes Vena Cava Filter Placement; Ankle surgery; other surgical history; Toe Surgery (06/25/2019); Dilation and curettage of uterus (N/A, 08/15/2019); back surgery (03/2022); Wrist surgery (Right); and Uterus surgery (N/A, 11/1/2024).    CURRENT MEDICATIONS       Previous Medications    ACETAMINOPHEN (TYLENOL) 500 MG TABLET    Take 2 tablets by mouth every 6 hours as needed for Pain or Fever Don't take more than 3,000 mg each day.    ALBUTEROL SULFATE HFA (PROVENTIL;VENTOLIN;PROAIR) 108 (90 BASE) MCG/ACT INHALER    INHALE 1 TO 2 PUFFS BY MOUTH EVERY 4 TO 6 HOURS AS NEEDED    ALENDRONATE (FOSAMAX) 70 MG TABLET    Take  Normal breath sounds. No stridor. No wheezing or rhonchi.   Skin:     General: Skin is warm and dry.      Capillary Refill: Capillary refill takes less than 2 seconds.      Coloration: Skin is not jaundiced or pale.      Findings: No bruising.   Neurological:      General: No focal deficit present.      Mental Status: She is alert and oriented to person, place, and time. Mental status is at baseline.   Psychiatric:         Mood and Affect: Mood normal.         Behavior: Behavior normal.         Thought Content: Thought content normal.         Judgment: Judgment normal.         DIAGNOSTIC RESULTS   Labs:  Results for orders placed or performed during the hospital encounter of 07/19/25   EKG 12 Lead   Result Value Ref Range    Ventricular Rate 65 BPM    Atrial Rate 65 BPM    P-R Interval 138 ms    QRS Duration 84 ms    Q-T Interval 390 ms    QTc Calculation (Bazett) 405 ms    P Axis 57 degrees    R Axis 19 degrees    T Axis 28 degrees       IMAGING:  No orders to display      URGENT CARE COURSE:     Vitals:    07/19/25 1224   BP: (!) 141/89   Pulse: 60   Resp: 20   Temp: 97.8 °F (36.6 °C)   TempSrc: Temporal   SpO2: 100%   Weight: 95.3 kg (210 lb)       Medications   aspirin chewable tablet 324 mg (324 mg Oral Given 7/19/25 1227)     PROCEDURES:  None  FINAL IMPRESSION      1. Chest pain, unspecified type        DISPOSITION/PLAN   DISPOSITION Decision To Transfer 07/19/2025 12:29:08 PM   DISPOSITION CONDITION Stable         Physical exam reveals acute, active left-sided chest pain.  EKG completed and does not reveal acute STEMI.  Patient does have history of pulmonary embolism and she is currently on anticoagulants.  Discussed with patient she needs further evaluation in the emergency room to rule out cardiac abnormality.  Patient was offered ambulance transfer although declines reporting she will drive self.  Patient given 324 mg aspirin prior to transfer.  Patient transferred to [facility] emergency room in stable

## 2025-07-19 NOTE — DISCHARGE INSTRUCTIONS
You were seen today for chest pain. You received EKG and xray. EKG and heart enzymes were within normal limits. Xray showed no acute fractures. Please apply lidocaine patch and take tylenol to help with pain.  Avoid NSAID use such as ibuprofen due to the fact that you are on Warfarin. please follow up with PCP.  Please return to the ED if symptoms worsen as worsening chest pain nausea vomiting shortness of breath or signs of redness swelling.

## 2025-07-19 NOTE — ED NOTES
Pt refused EKG at this time. States she already had one at  and is unsure if her insurance would cover another one. Pt denies pain at this time while sitting.

## 2025-08-07 ENCOUNTER — ANTI-COAG VISIT (OUTPATIENT)
Age: 60
End: 2025-08-07
Payer: COMMERCIAL

## 2025-08-07 DIAGNOSIS — Z79.01 ANTICOAGULATED ON COUMADIN: ICD-10-CM

## 2025-08-07 DIAGNOSIS — Z51.81 ENCOUNTER FOR THERAPEUTIC DRUG MONITORING: ICD-10-CM

## 2025-08-07 DIAGNOSIS — I26.99 PULMONARY EMBOLISM, UNSPECIFIED CHRONICITY, UNSPECIFIED PULMONARY EMBOLISM TYPE, UNSPECIFIED WHETHER ACUTE COR PULMONALE PRESENT (HCC): Primary | ICD-10-CM

## 2025-08-07 DIAGNOSIS — I82.409 DEEP VEIN THROMBOSIS (DVT) OF LOWER EXTREMITY, UNSPECIFIED CHRONICITY, UNSPECIFIED LATERALITY, UNSPECIFIED VEIN (HCC): ICD-10-CM

## 2025-08-07 LAB — POC INR: 3.5 (ref 0.8–1.2)

## 2025-08-07 PROCEDURE — 85610 PROTHROMBIN TIME: CPT

## 2025-08-07 PROCEDURE — 99212 OFFICE O/P EST SF 10 MIN: CPT

## 2025-08-21 ENCOUNTER — ANTI-COAG VISIT (OUTPATIENT)
Age: 60
End: 2025-08-21
Payer: COMMERCIAL

## 2025-08-21 DIAGNOSIS — I82.409 DEEP VEIN THROMBOSIS (DVT) OF LOWER EXTREMITY, UNSPECIFIED CHRONICITY, UNSPECIFIED LATERALITY, UNSPECIFIED VEIN (HCC): ICD-10-CM

## 2025-08-21 DIAGNOSIS — I26.99 PULMONARY EMBOLISM, UNSPECIFIED CHRONICITY, UNSPECIFIED PULMONARY EMBOLISM TYPE, UNSPECIFIED WHETHER ACUTE COR PULMONALE PRESENT (HCC): Primary | ICD-10-CM

## 2025-08-21 DIAGNOSIS — Z79.01 ANTICOAGULATED ON COUMADIN: ICD-10-CM

## 2025-08-21 DIAGNOSIS — Z51.81 ENCOUNTER FOR THERAPEUTIC DRUG MONITORING: ICD-10-CM

## 2025-08-21 LAB — POC INR: 2.3 (ref 0.8–1.2)

## 2025-08-21 PROCEDURE — 99211 OFF/OP EST MAY X REQ PHY/QHP: CPT

## 2025-08-21 PROCEDURE — 85610 PROTHROMBIN TIME: CPT

## 2025-09-05 ENCOUNTER — TELEPHONE (OUTPATIENT)
Age: 60
End: 2025-09-05

## (undated) DEVICE — BLADE LARYNSCP SZ 3 ENH DIR INTUB GLIDESCOPE MCGRATH MAC

## (undated) DEVICE — Device

## (undated) DEVICE — RED RUBBER ROBINSON URETHRAL CATHETER, RADIOPAQUE, SMOOTH ROUNDED TIP, 14 FR (4.7 MM): Brand: DOVER

## (undated) DEVICE — SOLUTION IV IRRIG WATER 1000ML POUR BRL 2F7114

## (undated) DEVICE — BLADE LARYNSCP SZ 4 ENH DIR INTUB GLIDESCOPE MCGRATH MAC

## (undated) DEVICE — YANKAUER,BULB TIP,W/O VENT,RIGID,STERILE: Brand: MEDLINE

## (undated) DEVICE — DRAPE,UNDERBUTTOCKS,PCH,STERILE: Brand: MEDLINE

## (undated) DEVICE — SURE SET SINGLE BASIN-LF: Brand: MEDLINE INDUSTRIES, INC.

## (undated) DEVICE — SYSTEM WST MGMT W/ CAP AVETA

## (undated) DEVICE — HYSTEROSCOPE RIGID CORAL AVETA DISP

## (undated) DEVICE — SHEETS DRAW

## (undated) DEVICE — SOLUTION SCRB 4OZ 4% CHG H2O AIDED FOR PREOPERATIVE SKIN

## (undated) DEVICE — SOLUTION IRRIG 3000ML 0.9% SOD CHL USP UROMATIC PLAS CONT

## (undated) DEVICE — COVER ARMBRD W13XL28.5IN IMPERV BLU FOR OP RM

## (undated) DEVICE — GAUZE,SPONGE,8"X4",12PLY,XRAY,STRL,LF: Brand: MEDLINE

## (undated) DEVICE — GLOVE ORANGE PI 7   MSG9070

## (undated) DEVICE — PAD,NON-ADHERENT,3X8,STERILE,LF,1/PK: Brand: MEDLINE

## (undated) DEVICE — SOLUTION IV 1000ML 0.9% SOD CHL PH 5 INJ USP VIAFLX PLAS

## (undated) DEVICE — LINER SUCT CANSTR 1500CC SEMI RIG W/ POR HYDROPHOBIC SHUT

## (undated) DEVICE — GLOVE SURG SZ 65 THK91MIL LTX FREE SYN POLYISOPRENE

## (undated) DEVICE — Y-TYPE TUR/BLADDER IRRIGATION SET, REGULATING CLAMP

## (undated) DEVICE — GOWN,SIRUS,NON REINFRCD,LARGE,SET IN SL: Brand: MEDLINE

## (undated) DEVICE — CATHETER,URETHRAL,REDRUBBER,STRL,14FR: Brand: MEDLINE INDUSTRIES, INC.

## (undated) DEVICE — PAD,SANITARY,11 IN,MAXI,W/WINGS,N-STRL: Brand: MEDLINE

## (undated) DEVICE — TUBING, SUCTION, 1/4" X 20', STRAIGHT: Brand: MEDLINE INDUSTRIES, INC.

## (undated) DEVICE — Z DISCONTINUED USE 2711682 TRAY SKIN PREP DRY W/ PREM GLV

## (undated) DEVICE — LEGGINGS, PAIR, CLEAR, STERILE: Brand: MEDLINE

## (undated) DEVICE — SYSTEM WST MGMT AVETA